# Patient Record
Sex: FEMALE | Race: WHITE | Employment: FULL TIME | URBAN - NONMETROPOLITAN AREA
[De-identification: names, ages, dates, MRNs, and addresses within clinical notes are randomized per-mention and may not be internally consistent; named-entity substitution may affect disease eponyms.]

---

## 2017-01-03 RX ORDER — VENLAFAXINE 75 MG/1
TABLET ORAL
Qty: 90 TAB | Refills: 0 | Status: SHIPPED | OUTPATIENT
Start: 2017-01-03 | End: 2017-04-18 | Stop reason: SDUPTHER

## 2017-04-19 ENCOUNTER — TELEPHONE (OUTPATIENT)
Dept: MEDICAL GROUP | Facility: PHYSICIAN GROUP | Age: 57
End: 2017-04-19

## 2017-04-19 DIAGNOSIS — E03.9 HYPOTHYROIDISM, UNSPECIFIED TYPE: ICD-10-CM

## 2017-04-19 RX ORDER — VENLAFAXINE 75 MG/1
TABLET ORAL
Qty: 90 TAB | Refills: 0 | Status: SHIPPED | OUTPATIENT
Start: 2017-04-19 | End: 2017-06-09 | Stop reason: SDUPTHER

## 2017-04-28 ENCOUNTER — OFFICE VISIT (OUTPATIENT)
Dept: MEDICAL GROUP | Facility: PHYSICIAN GROUP | Age: 57
End: 2017-04-28
Payer: COMMERCIAL

## 2017-04-28 ENCOUNTER — HOSPITAL ENCOUNTER (OUTPATIENT)
Facility: MEDICAL CENTER | Age: 57
End: 2017-04-28
Attending: INTERNAL MEDICINE
Payer: COMMERCIAL

## 2017-04-28 VITALS
OXYGEN SATURATION: 95 % | SYSTOLIC BLOOD PRESSURE: 140 MMHG | BODY MASS INDEX: 30.7 KG/M2 | RESPIRATION RATE: 16 BRPM | WEIGHT: 191 LBS | HEIGHT: 66 IN | DIASTOLIC BLOOD PRESSURE: 82 MMHG | HEART RATE: 87 BPM | TEMPERATURE: 98.1 F

## 2017-04-28 DIAGNOSIS — Z78.0 POSTMENOPAUSAL: ICD-10-CM

## 2017-04-28 DIAGNOSIS — N95.2 ATROPHIC VAGINITIS: ICD-10-CM

## 2017-04-28 DIAGNOSIS — Z12.4 CERVICAL CANCER SCREENING: ICD-10-CM

## 2017-04-28 DIAGNOSIS — F43.9 STRESS AT HOME: ICD-10-CM

## 2017-04-28 DIAGNOSIS — N39.3 STRESS INCONTINENCE OF URINE: ICD-10-CM

## 2017-04-28 PROCEDURE — 99214 OFFICE O/P EST MOD 30 MIN: CPT | Performed by: INTERNAL MEDICINE

## 2017-04-28 PROCEDURE — 88175 CYTOPATH C/V AUTO FLUID REDO: CPT

## 2017-04-28 PROCEDURE — 87624 HPV HI-RISK TYP POOLED RSLT: CPT

## 2017-04-28 RX ORDER — INSULIN LISPRO 100 [IU]/ML
30 INJECTION, SUSPENSION SUBCUTANEOUS 3 TIMES DAILY
Qty: 5 PEN | Refills: 11 | Status: SHIPPED | OUTPATIENT
Start: 2017-04-28 | End: 2018-03-01 | Stop reason: SDUPTHER

## 2017-04-28 ASSESSMENT — PATIENT HEALTH QUESTIONNAIRE - PHQ9: CLINICAL INTERPRETATION OF PHQ2 SCORE: 2

## 2017-04-28 ASSESSMENT — PAIN SCALES - GENERAL: PAINLEVEL: NO PAIN

## 2017-04-28 NOTE — PROGRESS NOTES
Chief Complaint   Patient presents with   • Gynecologic Exam     pap       HISTORY OF PRESENT ILLNESS: Patient is a 56 y.o. female established patient who has not been seen for over one year. Presents today to discuss the medical issues below.    Type II diabetes mellitus, uncontrolled  Insurance changes so not currently following with endocrine.  Patient continues with fluctuation on the FS.  Currently using the empagliflozin, changed the novolog to Humalog 75/25 at 30 tid with meals.  Denies polydipsia polyuria    Stress at home  Flooding at home.  Boyfriend moving in. Continued issues with daughter    Postmenopausal  Patient reports last vaginal bleeding 1 year ago, she did have she did have this spontaneously discontinue and therefore did not take the estrogen/progesterone at all. She currently is not taking hormone replacement. She did not keep the appointment for referral to gynecology and is here for Pap smear.    Atrophic vaginitis  Currently not using estrogen, still with vaginal dryness.      Stress incontinence of urine  Patient with stress incontinence with running, did not see gyne.        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 5 PEN 11   • venlafaxine (EFFEXOR) 75 MG Tab TAKE ONE  TABLET BY MOUTH ONCE DAILY 90 Tab 0   • levothyroxine (SYNTHROID) 112 MCG Tab TAKE ONE TABLET BY MOUTH ONCE DAILY 90 Tab 3   • metformin (GLUCOPHAGE) 1000 MG tablet TAKE ONE TABLET BY MOUTH TWICE DAILY WITH MEALS 180 Tab 3   • Blood Glucose Monitoring Suppl SUPPLIES MISC Relion Test strips. Test 4 times daily. ICD 9 250.00 Type 2 diabetes insulin requiring 400 Strip 3   • Insulin Aspart Prot & Aspart (NOVOLOG MIX 70/30 FLEXPEN) (70-30) 100 UNIT/ML SUPN Inject 30 Units as instructed 3 times a day. 90 mL 3   • hydrocodone-acetaminophen (VICODIN) 5-500 MG TABS Take 1-2 Tabs by mouth every four hours as needed.     • Insulin Pen Needle (PEN NEEDLES) 31G X 6 MM MISC 1 Each by Does not apply route 3 times a day before meals. 100 Each 11   • Multiple Vitamin (MULTIVITAMINS PO) Take  by mouth.     • celecoxib (CELEBREX) 100 MG Cap Take 1 Cap by mouth every day. (Patient not taking: Reported on 2017) 30 Cap 3   • hydrocodone-acetaminophen (NORCO) 5-325 MG Tab per tablet Take 1-2 Tabs by mouth every four hours as needed.     • Empagliflozin (JARDIANCE PO) Take  by mouth.     • TURMERIC PO Take 1 Tab by mouth every day.       No current facility-administered medications for this visit.         Past Medical History   Diagnosis Date   • Diabetes    • Hypothyroid    • LBP (low back pain)    • Rash 2015   • Avulsion of right knee 2016   • HPV in female 2016   • Atrophic vaginitis 2016   • Anxious depression 2016   • Stress incontinence of urine 2017       Social History   Substance Use Topics   • Smoking status: Never Smoker    • Smokeless tobacco: Never Used   • Alcohol Use: 0.6 oz/week     0 Standard drinks or equivalent, 1 Glasses of wine per week      Comment: glass of wine a few times a year       Family Status   Relation Status Death Age   • Mother Alive    • Brother  46     trauma   • Father Alive    • Son Alive    • Daughter Alive    • Daughter Alive      Family History   Problem  "Relation Age of Onset   • Diabetes Mother    • Diabetes Brother      type 1,    • Hypertension Brother        ROS:    Respiratory: Negative for cough, sputum production, shortness of breath or wheezing.    Cardiovascular: Negative for chest pain, palpitations, orthopnea, dyspnea with exertion or edema.   Gastrointestinal: Negative for GI upset, nausea, vomiting, abdominal pain, constipation or diarrhea.   Genitourinary: Negative for dysuria, urgency, hesitancy or frequency.       Exam:    Blood pressure 140/82, pulse 87, temperature 36.7 °C (98.1 °F), resp. rate 16, height 1.689 m (5' 6.5\"), weight 86.637 kg (191 lb), last menstrual period 2011, SpO2 95 %.  General:  Well nourished, well developed female in NAD.  HENT: Normocephalic, bilateral TMs are intact, nasal and oral mucosa with no lesions,   Neck: Supple without bruit. Thyroid is not enlarged.  Pulmonary: Clear to ausculation and percussion.  Normal effort. No rales, rhonchi, or wheezing.  Cardiovascular: Regular rate and rhythm without murmur.   Abdomen: Normal bowel sounds soft and nontender no palpable liver spleen bladder mass.  Breast: Bilaterally symmetrical, no nipple discharge, no skin changes or dimpling are  noted.  Both breasts are free of palpable pathology and the axillas are free of lymphadenopathy.  : normal external female genitalia, entroitus sans lesions, cervix visualized with no abnormal findings, pap smear is taken, on bimanual exam utereus of normal size, no masses or tenderness.    Extremities: No LE edema noted.  Neuro: Grossly nonfocal.  Psych: Alert and oriented to person, place, and time. Appropriate mood and conversation.    No recent labs available to us, endocrinology following at private office    This dictation was created using voice recognition software. I have made reasonable attempts to correct errors, however, errors of grammar and content may exist.          Assessment/Plan:    1. Uncontrolled type 2 " diabetes mellitus without complication, with long-term current use of insulin (CMS-[facility])  Patient is following home readings indicates numbers are bouncing all over the place she would like to consider some various medication changes will schedule for diabetic nurse follow, refill on her insulin pen. Obtain labs having been done by endocrinology recently.  - Insulin Lispro Prot & Lispro (75-25) 100 UNIT/ML Suspension Pen-injector; Inject 30 Units as instructed 3 times a day.  Dispense: 5 PEN; Refill: 11    2. Stress at home  Patient continues to identify stress at work place home etc. minimal coping skills, not sure she wants to do some counseling discussed at length referral made for her option to be considered  - REFERRAL TO BEHAVIORAL HEALTH    3. Postmenopausal  Continuing with difficulty with atrophic vaginitis did not keep appointment with gynecology. Trial at estrogen progestins last year resulted in bleeding. Will refer to gynecology  - REFERRAL TO GYNECOLOGY    4. Atrophic vaginitis  Vaginal bleeding with estrogen progesterone systemic. Referral to gynecology for evaluation for options of topical therapy  - REFERRAL TO GYNECOLOGY    5. Stress incontinence of urine  Patient would benefit from estrogen trial deferring to gynecology as well as consideration for intervention  - REFERRAL TO GYNECOLOGY    6. Cervical cancer screening  Clinically stable Pap smear done  - THINPREP PAP WITH HPV; Future    7. Hypothyroid  Patient continuing on thyroid replacement will obtain labs from endocrine assume ongoing monitoring and management.     Patient was seen for  25 minutes face to face of which more than 50% of the time was spent in counseling and coordination of care regarding the above problems.

## 2017-04-28 NOTE — ASSESSMENT & PLAN NOTE
Insurance changes so not following with endocrine.  Patient continues with fluctuation on the FS.  Currently using the empagliflozin, changed the novolog to Humalog 75/25 at 30 tid with meals.

## 2017-04-28 NOTE — MR AVS SNAPSHOT
"        Gem Anibal   2017 9:00 AM   Office Visit   MRN: [id number]    Department:  [facility]   Dept Phone:  [phone]    Description:  Female : 1960   Provider:  Marleny VARGAS M.D.           Reason for Visit     Gynecologic Exam pap      Allergies as of 2017     Allergen Noted Reactions    Farxiga [Dapagliflozin] 10/05/2015   Itching    Complains of hives and itching    Invokana [Canagliflozin] 10/05/2015   Itching    Complains of hives and itching    Jardiance [Empagliflozin] 2017   Itching      You were diagnosed with     Uncontrolled type 2 diabetes mellitus without complication, with long-term current use of insulin (CMS-[facility])   [6752377]       Stress at home   [438470]       Postmenopausal   [215004]       Atrophic vaginitis   [426943]       Stress incontinence of urine   [3117309]       Cervical cancer screening   [350870]         Vital Signs     Blood Pressure Pulse Temperature Respirations Height Weight    140/82 mmHg 87 36.7 °C (98.1 °F) 16 1.689 m (5' 6.5\") 86.637 kg (191 lb)    Body Mass Index Oxygen Saturation Last Menstrual Period Smoking Status          30.37 kg/m2 95% 2011 Never Smoker         Basic Information     Date Of Birth Sex Race Ethnicity Preferred Language    1960 Female White Unknown English      Your appointments     2017  2:00 PM   Urgent/Same Day with Marleny VARGAS M.D.   [facility] (--)    [address] [address] [phone]           You will be receiving a confirmation call a few days before your appointment from our automated call confirmation system.              Problem List              ICD-10-CM Priority Class Noted - Resolved    Hypothyroid E03.9   Unknown - Present    Low back pain M54.5   Unknown - Present    Stress at home F43.9   3/20/2014 - Present    Postmenopausal Z78.0   3/20/2014 - Present    Type II diabetes mellitus, uncontrolled (CMS-[facility]) E11.65   2014 - " Present    Hand pain, left M79.642   4/10/2014 - Present    Encounter for long-term (current) use of insulin (CMS-HCC) Z79.4   4/8/2015 - Present    Rash R21   9/22/2015 - Present    Acute meniscal tear of right knee S83.206A   2/26/2016 - Present    HPV in female A63.0   2/26/2016 - Present    Atrophic vaginitis N95.2   2/26/2016 - Present    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, F32.9   2/26/2016 - Present    Stress incontinence of urine N39.3   4/28/2017 - Present      Health Maintenance        Date Due Completion Dates    RETINAL SCREENING 7/29/1979 ---    IMM DTaP/Tdap/Td Vaccine (1 - Tdap) 7/29/1980 ---    IMM PNEUMOCOCCAL 19-64 (ADULT) MEDIUM RISK SERIES (1 of 1 - PPSV23) 7/29/1980 ---    MAMMOGRAM 7/29/2001 ---    COLONOSCOPY 7/29/2011 ---    SERUM CREATININE 3/26/2015 3/26/2014    FASTING LIPID PROFILE 2/14/2016 2/14/2015, 2/14/2015, 3/26/2014    URINE ACR / MICROALBUMIN 2/14/2016 2/14/2015, 3/26/2014    A1C SCREENING 3/22/2016 9/22/2015, 4/8/2015, 10/8/2014, 4/2/2014    DIABETES MONOFILAMENT / LE EXAM 4/8/2016 4/8/2015 (N/S)    Override on 4/8/2015: (N/S)    IMM HEP B VACCINE (3 of 3 - Primary Series) 4/26/2016 2/23/2016, 1/5/2016    PAP SMEAR 12/22/2018 12/22/2015, 8/1/2011, 2/11/2010            Current Immunizations     Hepatitis B Vaccine Recombivax (Adol/Adult) 2/23/2016  5:45 PM, 1/5/2016  1:54 PM    Influenza Vaccine Quad Inj (Pf) 10/8/2014      Below and/or attached are the medications your provider expects you to take. Review all of your home medications and newly ordered medications with your provider and/or pharmacist. Follow medication instructions as directed by your provider and/or pharmacist. Please keep your medication list with you and share with your provider. Update the information when medications are discontinued, doses are changed, or new medications (including over-the-counter products) are added; and carry medication information at all times in the event of emergency situations     Allergies:   FARXIGA - Itching     INVOKANA - Itching     JARDIANCE - Itching               Medications  Valid as of: April 28, 2017 -  9:46 AM    Generic Name Brand Name Tablet Size Instructions for use    Blood Glucose Monitoring Suppl (Misc) Blood Glucose Monitoring Suppl SUPPLIES Relion Test strips. Test 4 times daily. ICD 9 250.00 Type 2 diabetes insulin requiring        Celecoxib (Cap) CELEBREX 100 MG Take 1 Cap by mouth every day.        Empagliflozin   Take  by mouth.        Hydrocodone-Acetaminophen (Tab) VICODIN 5-500 MG Take 1-2 Tabs by mouth every four hours as needed.        Hydrocodone-Acetaminophen (Tab) NORCO 5-325 MG Take 1-2 Tabs by mouth every four hours as needed.        Insulin Aspart Prot & Aspart (Suspension Pen-injector) Insulin Aspart Prot & Aspart (70-30) 100 UNIT/ML Inject 30 Units as instructed 3 times a day.        Insulin Lispro Prot & Lispro (Suspension Pen-injector) Insulin Lispro Prot & Lispro (75-25) 100 UNIT/ML Inject 30 Units as instructed 3 times a day.        Insulin Pen Needle (Misc) Pen Needles 31G X 6 MM 1 Each by Does not apply route 3 times a day before meals.        Levothyroxine Sodium (Tab) SYNTHROID 112 MCG TAKE ONE TABLET BY MOUTH ONCE DAILY        MetFORMIN HCl (Tab) GLUCOPHAGE 1000 MG TAKE ONE TABLET BY MOUTH TWICE DAILY WITH MEALS        Multiple Vitamin   Take  by mouth.        Turmeric   Take 1 Tab by mouth every day.        Venlafaxine HCl (Tab) EFFEXOR 75 MG TAKE ONE TABLET BY MOUTH ONCE DAILY        .                 Medicines prescribed today were sent to:     [address] [address]    Phone: [phone] Fax: [phone]    Open 24 Hours?: No      Medication refill instructions:       If your prescription bottle indicates you have medication refills left, it is not necessary to call your provider’s office. Please contact your pharmacy and they will refill your medication.    If your prescription bottle  indicates you do not have any refills left, you may request refills at any time through one of the following ways: The online [email or web address] system (except Urgent Care), by calling your provider’s office, or by asking your pharmacy to contact your provider’s office with a refill request. Medication refills are processed only during regular business hours and may not be available until the next business day. Your provider may request additional information or to have a follow-up visit with you prior to refilling your medication.   *Please Note: Medication refills are assigned a new Rx number when refilled electronically. Your pharmacy may indicate that no refills were authorized even though a new prescription for the same medication is available at the pharmacy. Please request the medicine by name with the pharmacy before contacting your provider for a refill.        Your To Do List     Future Labs/Procedures Complete By Expires    THINPREP PAP WITH HPV  As directed 4/28/2018      Referral     A referral request has been sent to our patient care coordination department. Please allow 3-5 business days for us to process this request and contact you either by phone or mail. If you do not hear from us by the 5th business day, please call us at [phone].           [email or web address] Access Code: Activation code not generated  Current [email or web address] Status: Active

## 2017-04-28 NOTE — ASSESSMENT & PLAN NOTE
Patient reports last vaginal bleeding 1 year ago, she did have she did have this spontaneously discontinue and therefore did not take the estrogen/progesterone at all. She currently is not taking hormone replacement. She did not have referral to gynecology and is here for Pap smear.

## 2017-04-30 LAB
CYTOLOGY REG CYTOL: NORMAL
HPV HR 12 DNA CVX QL NAA+PROBE: NEGATIVE
HPV16 DNA SPEC QL NAA+PROBE: NEGATIVE
HPV18 DNA SPEC QL NAA+PROBE: NEGATIVE
SPECIMEN SOURCE: NORMAL

## 2017-05-26 ENCOUNTER — TELEPHONE (OUTPATIENT)
Dept: MEDICAL GROUP | Facility: PHYSICIAN GROUP | Age: 57
End: 2017-05-26

## 2017-05-26 NOTE — TELEPHONE ENCOUNTER
1. Caller Name: Pt                      Call Back Number: [phone] (home)       2. Message: Pt is making appt with Diabetes RN and Dr. Zurita, she wants to know if she can have the drug trulicity or something like it, injected once a week. She would like it prescribed so she can discuss results at Diabetes appt.     3. Patient approves office to leave a detailed voicemail/MyChart message: no

## 2017-05-31 ENCOUNTER — PATIENT MESSAGE (OUTPATIENT)
Dept: MEDICAL GROUP | Facility: PHYSICIAN GROUP | Age: 57
End: 2017-05-31

## 2017-06-02 NOTE — TELEPHONE ENCOUNTER
Gem would still like to try trulicity or similar once a week shot as you had discussed in Office visit.  Please advise

## 2017-06-05 ENCOUNTER — PATIENT MESSAGE (OUTPATIENT)
Dept: MEDICAL GROUP | Facility: PHYSICIAN GROUP | Age: 57
End: 2017-06-05

## 2017-06-05 DIAGNOSIS — Z20.1 EXPOSURE TO TB: ICD-10-CM

## 2017-06-08 ENCOUNTER — NON-PROVIDER VISIT (OUTPATIENT)
Dept: URGENT CARE | Facility: PHYSICIAN GROUP | Age: 57
End: 2017-06-08

## 2017-06-08 DIAGNOSIS — Z02.89 ENCOUNTER FOR OCCUPATIONAL HEALTH ASSESSMENT: ICD-10-CM

## 2017-06-08 PROCEDURE — 86580 TB INTRADERMAL TEST: CPT | Performed by: PHYSICIAN ASSISTANT

## 2017-06-08 NOTE — PROGRESS NOTES
Patient states [facility] sent her.  She paid out of pockets.  They are not sure as of now if she has been exposed.  She stated that if the test is positive then it will be a workers comp.

## 2017-06-08 NOTE — MR AVS SNAPSHOT
Gem Barnett   2017 10:30 AM   Non-Provider Visit   MRN: [id number]    Department:  [facility]   Dept Phone:  [phone]    Description:  Female : 1960   Provider:  [facility]           Reason for Visit     PPD Placement           Allergies as of 2017     Allergen Noted Reactions    Farxiga [Dapagliflozin] 10/05/2015   Itching    Complains of hives and itching    Invokana [Canagliflozin] 10/05/2015   Itching    Complains of hives and itching    Jardiance [Empagliflozin] 2017   Itching      You were diagnosed with     Encounter for occupational health assessment   [[id number]]         Vital Signs     Last Menstrual Period Smoking Status                2011 Never Smoker           Basic Information     Date Of Birth Sex Race Ethnicity Preferred Language    1960 Female White Unknown English      Your appointments     2017  2:00 PM   Urgent/Same Day with Marleny VARGAS M.D.   [facility] (--)    [address] [address] [phone]           You will be receiving a confirmation call a few days before your appointment from our automated call confirmation system.              Problem List              ICD-10-CM Priority Class Noted - Resolved    Hypothyroid E03.9   Unknown - Present    Low back pain M54.5   Unknown - Present    Stress at home F43.9   3/20/2014 - Present    Postmenopausal Z78.0   3/20/2014 - Present    Type II diabetes mellitus, uncontrolled (CMS-HCC) E11.65   2014 - Present    Hand pain, left M79.642   4/10/2014 - Present    Encounter for long-term (current) use of insulin (CMS-HCC) Z79.4   2015 - Present    Rash R21   2015 - Present    Acute meniscal tear of right knee S83.206A   2016 - Present    HPV in female A63.0   2016 - Present    Atrophic vaginitis N95.2   2016 - Present    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, F32.9   2016 - Present    Stress incontinence of urine N39.3    4/28/2017 - Present      Health Maintenance        Date Due Completion Dates    RETINAL SCREENING 7/29/1979 ---    IMM DTaP/Tdap/Td Vaccine (1 - Tdap) 7/29/1980 ---    IMM PNEUMOCOCCAL 19-64 (ADULT) MEDIUM RISK SERIES (1 of 1 - PPSV23) 7/29/1980 ---    MAMMOGRAM 7/29/2001 ---    COLONOSCOPY 7/29/2011 ---    SERUM CREATININE 3/26/2015 3/26/2014    FASTING LIPID PROFILE 2/14/2016 2/14/2015, 2/14/2015, 3/26/2014    URINE ACR / MICROALBUMIN 2/14/2016 2/14/2015, 3/26/2014    A1C SCREENING 3/22/2016 9/22/2015, 4/8/2015, 10/8/2014, 4/2/2014    DIABETES MONOFILAMENT / LE EXAM 4/8/2016 4/8/2015 (N/S)    Override on 4/8/2015: (N/S)    IMM HEP B VACCINE (3 of 3 - Primary Series) 4/26/2016 2/23/2016, 1/5/2016    PAP SMEAR 4/28/2020 4/28/2017, 12/22/2015, 8/1/2011, 2/11/2010            Current Immunizations     Hepatitis B Vaccine Recombivax (Adol/Adult) 2/23/2016  5:45 PM, 1/5/2016  1:54 PM    Influenza Vaccine Quad Inj (Pf) 10/8/2014    Tuberculin Skin Test 6/8/2017,  Incomplete      Below and/or attached are the medications your provider expects you to take. Review all of your home medications and newly ordered medications with your provider and/or pharmacist. Follow medication instructions as directed by your provider and/or pharmacist. Please keep your medication list with you and share with your provider. Update the information when medications are discontinued, doses are changed, or new medications (including over-the-counter products) are added; and carry medication information at all times in the event of emergency situations     Allergies:  FARXIGA - Itching     INVOKANA - Itching     JARDIANCE - Itching               Medications  Valid as of: June 08, 2017 - 11:53 AM    Generic Name Brand Name Tablet Size Instructions for use    Blood Glucose Monitoring Suppl (Misc) Blood Glucose Monitoring Suppl SUPPLIES Relion Test strips. Test 4 times daily. ICD 9 250.00 Type 2 diabetes insulin requiring        Celecoxib (Cap)  CELEBREX 100 MG Take 1 Cap by mouth every day.        Empagliflozin   Take  by mouth.        Hydrocodone-Acetaminophen (Tab) VICODIN 5-500 MG Take 1-2 Tabs by mouth every four hours as needed.        Hydrocodone-Acetaminophen (Tab) NORCO 5-325 MG Take 1-2 Tabs by mouth every four hours as needed.        Insulin Aspart Prot & Aspart (Suspension Pen-injector) Insulin Aspart Prot & Aspart (70-30) 100 UNIT/ML Inject 30 Units as instructed 3 times a day.        Insulin Lispro Prot & Lispro (Suspension Pen-injector) Insulin Lispro Prot & Lispro (75-25) 100 UNIT/ML Inject 30 Units as instructed 3 times a day.        Insulin Pen Needle (Misc) Pen Needles 31G X 6 MM 1 Each by Does not apply route 3 times a day before meals.        Levothyroxine Sodium (Tab) SYNTHROID 112 MCG TAKE ONE TABLET BY MOUTH ONCE DAILY        MetFORMIN HCl (Tab) GLUCOPHAGE 1000 MG TAKE ONE TABLET BY MOUTH TWICE DAILY WITH MEALS        Multiple Vitamin   Take  by mouth.        Turmeric   Take 1 Tab by mouth every day.        Venlafaxine HCl (Tab) EFFEXOR 75 MG TAKE ONE TABLET BY MOUTH ONCE DAILY        .                 Medicines prescribed today were sent to:     [facility] [address] [place] [id number]    Phone: [phone] Fax: [phone]    Open 24 Hours?: No      Medication refill instructions:       If your prescription bottle indicates you have medication refills left, it is not necessary to call your provider’s office. Please contact your pharmacy and they will refill your medication.    If your prescription bottle indicates you do not have any refills left, you may request refills at any time through one of the following ways: The online [employer] system (except Urgent Care), by calling your provider’s office, or by asking your pharmacy to contact your provider’s office with a refill request. Medication refills are processed only during regular business hours and may not be available until  the next business day. Your provider may request additional information or to have a follow-up visit with you prior to refilling your medication.   *Please Note: Medication refills are assigned a new Rx number when refilled electronically. Your pharmacy may indicate that no refills were authorized even though a new prescription for the same medication is available at the pharmacy. Please request the medicine by name with the pharmacy before contacting your provider for a refill.           [employer] Access Code: Activation code not generated  Current [employer] Status: Active

## 2017-06-09 NOTE — TELEPHONE ENCOUNTER
From: Gem Barnett  To: Marleny VARGAS M.D.  Sent: 6/9/2017 11:37 AM PDT  Subject: Medication Renewal Request    Original authorizing provider: NIYAH Hinton would like a refill of the following medications:  venlafaxine (EFFEXOR) 75 MG Tab [Marleny VARGAS M.D.]    Preferred pharmacy: [address] [place] [address]    Comment:

## 2017-06-10 ENCOUNTER — NON-PROVIDER VISIT (OUTPATIENT)
Dept: URGENT CARE | Facility: PHYSICIAN GROUP | Age: 57
End: 2017-06-10

## 2017-06-10 LAB — TB WHEAL 3D P 5 TU DIAM: 0 MM

## 2017-06-12 RX ORDER — VENLAFAXINE 75 MG/1
75 TABLET ORAL DAILY
Qty: 90 TAB | Refills: 1 | Status: SHIPPED | OUTPATIENT
Start: 2017-06-12 | End: 2017-09-05 | Stop reason: SINTOL

## 2017-06-13 NOTE — TELEPHONE ENCOUNTER
Samina has an appointment with you 7/27/17.  i let her know that you wanted labs to be done before her apt and she said she will call and schedule

## 2017-07-26 ENCOUNTER — TELEPHONE (OUTPATIENT)
Dept: MEDICAL GROUP | Facility: PHYSICIAN GROUP | Age: 57
End: 2017-07-26

## 2017-07-27 ENCOUNTER — TELEPHONE (OUTPATIENT)
Dept: MEDICAL GROUP | Facility: PHYSICIAN GROUP | Age: 57
End: 2017-07-27

## 2017-07-27 ENCOUNTER — OFFICE VISIT (OUTPATIENT)
Dept: MEDICAL GROUP | Facility: PHYSICIAN GROUP | Age: 57
End: 2017-07-27
Payer: COMMERCIAL

## 2017-07-27 VITALS
HEIGHT: 66 IN | BODY MASS INDEX: 30.37 KG/M2 | OXYGEN SATURATION: 98 % | SYSTOLIC BLOOD PRESSURE: 114 MMHG | TEMPERATURE: 98.1 F | WEIGHT: 189 LBS | DIASTOLIC BLOOD PRESSURE: 70 MMHG | RESPIRATION RATE: 16 BRPM | HEART RATE: 86 BPM

## 2017-07-27 ASSESSMENT — PAIN SCALES - GENERAL: PAINLEVEL: NO PAIN

## 2017-07-27 NOTE — ASSESSMENT & PLAN NOTE
Patient wants to try trulicity, the Jardiance and Forxigia caused itching.  She is using the Lispro 75/25 at 30 u prior to meals, varible eating, shoots for tid.  Not using novolog due to the insurance coverage, she is using the metformin.  Patient has had the victoza trial in the past, discontinued for unclear reasons.  FS are all over the place.  She has not been able to go back to Dr Anguiano due to insurance changes, she has not been able to get to the DM RN either.

## 2017-07-27 NOTE — MR AVS SNAPSHOT
"        Gem Barnett   2017 2:00 PM   Office Visit   MRN: [id number]    Department:  [facility]   Dept Phone:  [phone]    Description:  Female : 1960   Provider:  Marleny VARGAS M.D.           Reason for Visit     Results labs     Other trulicity       Allergies as of 2017     Allergen Noted Reactions    Farxiga [Dapagliflozin] 10/05/2015   Itching    Complains of hives and itching    Invokana [Canagliflozin] 10/05/2015   Itching    Complains of hives and itching    Jardiance [Empagliflozin] 2017   Itching      You were diagnosed with     Uncontrolled type 2 diabetes mellitus without complication, with long-term current use of insulin (CMS-HCC)   [[id number]]         Vital Signs     Blood Pressure Pulse Temperature Respirations Height Weight    114/70 mmHg 86 36.7 °C (98.1 °F) 16 1.689 m (5' 6.5\") 85.73 kg (189 lb)    Body Mass Index Oxygen Saturation Last Menstrual Period Smoking Status          30.05 kg/m2 98% 2011 Never Smoker         Basic Information     Date Of Birth Sex Race Ethnicity Preferred Language    1960 Female White Unknown English      Your appointments     Aug 01, 2017 11:20 AM   Diabetes Care Visit with Marleny VARGAS M.D., [facility] DIABETES RN   [facility] (--)    [address] [id number] [phone]           You will be receiving a confirmation call a few days before your appointment from our automated call confirmation system.              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   2/26/2016 - Present    Atrophic vaginitis N95.2   2/26/2016 - Present    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, 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   2/26/2016 - Present    Stress incontinence of urine N39.3   4/28/2017 - Present      Health Maintenance        Date Due Completion Dates    IMM DTaP/Tdap/Td Vaccine (1 - Tdap) 7/29/1980 ---    IMM PNEUMOCOCCAL 19-64 (ADULT) MEDIUM RISK SERIES (1 of 1 - PPSV23) 7/29/1980 ---    MAMMOGRAM 7/29/2001 ---    COLONOSCOPY 7/29/2011 ---    SERUM CREATININE 3/26/2015 3/26/2014    FASTING LIPID PROFILE 2/14/2016 2/14/2015, 2/14/2015, 3/26/2014    URINE ACR / MICROALBUMIN 2/14/2016 2/14/2015, 3/26/2014    A1C SCREENING 3/22/2016 9/22/2015, 4/8/2015, 10/8/2014, 4/2/2014    DIABETES MONOFILAMENT / LE EXAM 4/8/2016 4/8/2015 (N/S)    Override on 4/8/2015: (N/S)    IMM HEP B VACCINE (3 of 3 - Primary Series) 4/26/2016 2/23/2016, 1/5/2016    IMM INFLUENZA (1) 9/1/2017 10/8/2014    RETINAL SCREENING 6/13/2018 6/13/2017    PAP SMEAR 4/28/2020 4/28/2017, 12/22/2015, 8/1/2011, 2/11/2010            Current Immunizations     Hepatitis B Vaccine Recombivax (Adol/Adult) 2/23/2016  5:45 PM, 1/5/2016  1:54 PM    Influenza Vaccine Quad Inj (Pf) 10/8/2014    Tuberculin Skin Test 6/8/2017,  Incomplete      Below and/or attached are the medications your provider expects you to take. Review all of your home medications and newly ordered medications with your provider and/or pharmacist. Follow medication instructions as directed by your provider and/or pharmacist. Please keep your medication list with you and share with your provider. Update the information when medications are discontinued, doses are changed, or new medications (including over-the-counter products) are added; and carry medication information at all times in the event of emergency situations     Allergies:  FARXIGA - Itching     INVOKANA - Itching      JARDIANCE - Itching               Medications  Valid as of: July 27, 2017 -  2:48 PM    Generic Name Brand Name Tablet Size Instructions for use    Blood Glucose Monitoring Suppl (Misc) Blood Glucose Monitoring Suppl SUPPLIES Relion Test strips. Test 4 times daily. ICD 9 250.00 Type 2 diabetes insulin requiring        Celecoxib (Cap) CELEBREX 100 MG Take 1 Cap by mouth every day.        Hydrocodone-Acetaminophen (Tab) VICODIN 5-500 MG Take 1-2 Tabs by mouth every four hours as needed.        Hydrocodone-Acetaminophen (Tab) NORCO 5-325 MG Take 1-2 Tabs by mouth every four hours as needed.        Insulin Lispro Prot & Lispro (Suspension Pen-injector) Insulin Lispro Prot & Lispro (75-25) 100 UNIT/ML Inject 30 Units as instructed 3 times a day.        Insulin Pen Needle (Misc) Pen Needles 31G X 6 MM 1 Each by Does not apply route 3 times a day before meals.        Levothyroxine Sodium (Tab) SYNTHROID 112 MCG TAKE ONE TABLET BY MOUTH ONCE DAILY        MetFORMIN HCl (Tab) GLUCOPHAGE 1000 MG TAKE ONE TABLET BY MOUTH TWICE DAILY WITH MEALS        Multiple Vitamin   Take  by mouth.        Turmeric   Take 1 Tab by mouth every day.        Venlafaxine HCl (Tab) EFFEXOR 75 MG Take 1 Tab by mouth every day.        .                 Medicines prescribed today were sent to:     [address] [address]    Phone: [phone] Fax: [phone]    Open 24 Hours?: No      Medication refill instructions:       If your prescription bottle indicates you have medication refills left, it is not necessary to call your provider’s office. Please contact your pharmacy and they will refill your medication.    If your prescription bottle indicates you do not have any refills left, you may request refills at any time through one of the following ways: The online [employer] system (except Urgent Care), by calling your provider’s office, or by asking your pharmacy to contact your  provider’s office with a refill request. Medication refills are processed only during regular business hours and may not be available until the next business day. Your provider may request additional information or to have a follow-up visit with you prior to refilling your medication.   *Please Note: Medication refills are assigned a new Rx number when refilled electronically. Your pharmacy may indicate that no refills were authorized even though a new prescription for the same medication is available at the pharmacy. Please request the medicine by name with the pharmacy before contacting your provider for a refill.           MDCapsulet Access Code: Activation code not generated  Current [employer] Software Status: Active

## 2017-07-28 NOTE — TELEPHONE ENCOUNTER
1. Caller Name: Gem                      Call Back Number: [phone]    2. Message: patient stated today that she had an annual wellness visit in March with you.  Her insurance is trying to get her to pay a Co-Pay for the visit because they say it was not an annual visit. I am not sure how to pursue this.  Please advise     3. Patient approves office to leave a detailed voicemail/MyChart message: N\A

## 2017-07-28 NOTE — TELEPHONE ENCOUNTER
It is my impression that if we do any medical intervention then the visit does not qualify as annual wellness.  The notes clearly document the refill on the insulin and the referral to [facility].  This is why we clarify with the patient exactly what annual wellness means.  I am happy to request the change to annual wellness, however, likely the insurance company will look at the records and let us know it does not qualify.  Generally I refer this to the billing department, they take a look at the documentation and let us know if the change to annual wellness would be accepted by the insurance co.

## 2017-07-28 NOTE — PROGRESS NOTES
Chief Complaint   Patient presents with   • Results     labs    • Other     trulicity        HISTORY OF PRESENT ILLNESS: Patient is a 56 y.o. female established patient who presents today to discuss the medical issues below.    Type II diabetes mellitus, uncontrolled (CMS-HCC)  Patient wants to try trulicity, the Jardiance and Forxigia caused itching.  She is using the Lispro 75/25 at 30 u prior to meals, varible eating, shoots for tid.  Not using novolog due to the insurance coverage, she is using the metformin.  Patient has had the victoza trial in the past, discontinued for unclear reasons.  FS are all over the place.  She has not been able to go back to Dr Anguiano due to insurance changes, she has not been able to get to the DM RN either.       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 90 Tab 1   • Insulin Lispro Prot & Lispro (75-25) 100 UNIT/ML Suspension Pen-injector Inject 30 Units as instructed 3 times a day. 5 PEN 11   • levothyroxine (SYNTHROID) 112 MCG Tab TAKE ONE TABLET BY MOUTH ONCE DAILY 90 Tab 3   • metformin (GLUCOPHAGE) 1000 MG tablet TAKE ONE TABLET BY MOUTH TWICE DAILY WITH MEALS 180 Tab 3   • Blood Glucose Monitoring Suppl SUPPLIES MISC Relion Test strips. Test 4 times daily. ICD 9 250.00 Type 2 diabetes insulin requiring 400 Strip 3    • hydrocodone-acetaminophen (VICODIN) 5-500 MG TABS Take 1-2 Tabs by mouth every four hours as needed.     • Insulin Pen Needle (PEN NEEDLES) 31G X 6 MM MISC 1 Each by Does not apply route 3 times a day before meals. 100 Each 11   • Multiple Vitamin (MULTIVITAMINS PO) Take  by mouth.     • celecoxib (CELEBREX) 100 MG Cap Take 1 Cap by mouth every day. (Patient not taking: Reported on 2017) 30 Cap 3   • hydrocodone-acetaminophen (NORCO) 5-325 MG Tab per tablet Take 1-2 Tabs by mouth every four hours as needed.     • TURMERIC PO Take 1 Tab by mouth every day.       No current facility-administered medications for this visit.         Past Medical History   Diagnosis Date   • Diabetes    • Hypothyroid    • LBP (low back pain)    • Rash 2015   • Avulsion of right knee 2016   • HPV in female 2016   • Atrophic vaginitis 2016   • Anxious depression 2016   • Stress incontinence of urine 2017       Social History   Substance Use Topics   • Smoking status: Never Smoker    • Smokeless tobacco: Never Used   • Alcohol Use: 0.6 oz/week     1 Glasses of wine, 0 Standard drinks or equivalent per week      Comment: glass of wine a few times a year       Family Status   Relation Status Death Age   • Mother Alive    • Brother  46     trauma   • Father Alive    • Son Alive    • Daughter Alive    • Daughter Alive      Family History   Problem Relation Age of Onset   • Diabetes Mother    • Diabetes Brother      type 1,    • Hypertension Brother        ROS:    Respiratory: Negative for cough, sputum production, shortness of breath or wheezing.    Cardiovascular: Negative for chest pain, palpitations, orthopnea, dyspnea with exertion or edema.   Gastrointestinal: Negative for GI upset, nausea, vomiting, abdominal pain, constipation or diarrhea.   Genitourinary: Negative for dysuria, urgency, hesitancy or frequency.       Exam:    Blood pressure 114/70, pulse 86, temperature 36.7 °C (98.1  "°F), resp. rate 16, height 1.689 m (5' 6.5\"), weight 85.73 kg (189 lb), last menstrual period 06/30/2011, SpO2 98 %.  General:  Well nourished, well developed female in NAD.  Pulmonary: Clear to ausculation and percussion.  Normal effort. No rales, rhonchi, or wheezing.  Cardiovascular: Regular rate and rhythm without murmur.   Abdomen: Normal bowel sounds soft and nontender no palpable liver spleen bladder mass.    LABS: Results reviewed and discussed with the patient, questions answered.      This dictation was created using voice recognition software. I have made reasonable attempts to correct errors, however, errors of grammar and content may exist.          Assessment/Plan:    1. Uncontrolled type 2 diabetes mellitus without complication, with long-term current use of insulin (CMS-HCC)  Patient was very complicated history in terms of medication use, ability to control blood sugars. She has home readings that are very variable most likely on the basis of dietary variability. Patient would like a trial of GLP-1 as [facility] hoping that that would smooth out how things are going. She currently continues on insulin utilization. She has had problems with the SG L2 class of medicines. She did very well on Victoza in the past without utilization of insulin at that time. Patient indicates this was discontinued because of variability of blood sugars? Overall management somewhat complex she's having difficulty reestablishing with endocrinology when Dr. Anguiano's office left her insurance plan. Will schedule her for diabetic nurse next week to discuss options and establish close monitoring in that venue.  Patient very concerned about cost. She has co-pay with office visits. Will defer this visit to the diabetic nurse office visit.    "

## 2017-08-01 ENCOUNTER — OFFICE VISIT (OUTPATIENT)
Dept: MEDICAL GROUP | Facility: PHYSICIAN GROUP | Age: 57
End: 2017-08-01
Payer: COMMERCIAL

## 2017-08-01 VITALS
BODY MASS INDEX: 29.82 KG/M2 | WEIGHT: 190 LBS | HEIGHT: 67 IN | DIASTOLIC BLOOD PRESSURE: 70 MMHG | OXYGEN SATURATION: 97 % | TEMPERATURE: 97.5 F | HEART RATE: 90 BPM | SYSTOLIC BLOOD PRESSURE: 122 MMHG

## 2017-08-01 PROCEDURE — 99215 OFFICE O/P EST HI 40 MIN: CPT | Performed by: INTERNAL MEDICINE

## 2017-08-01 RX ORDER — LEVOTHYROXINE SODIUM 0.12 MG/1
125 TABLET ORAL
Qty: 90 TAB | Refills: 3 | Status: SHIPPED | OUTPATIENT
Start: 2017-08-01 | End: 2018-07-26 | Stop reason: SDUPTHER
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
## 2017-08-01 NOTE — MR AVS SNAPSHOT
"        Gem Barnett   2017 11:20 AM   Office Visit   MRN: [id number]    Department:  [facility]   Dept Phone:  [phone]    Description:  Female : 1960   Provider:  Marleny VARGAS M.D.; NIKKI DIABETES RN           Reason for Visit     Diabetes           Allergies as of 2017     Allergen Noted Reactions    Farxiga [Dapagliflozin] 10/05/2015   Itching    Complains of hives and itching    Invokana [Canagliflozin] 10/05/2015   Itching    Complains of hives and itching    Jardiance [Empagliflozin] 2017   Itching      You were diagnosed with     Uncontrolled type 2 diabetes mellitus without complication, with long-term current use of insulin (CMS-HCC)   [[id number]]         Vital Signs     Blood Pressure Pulse Temperature Height Weight Body Mass Index    122/70 mmHg 90 36.4 °C (97.5 °F) 1.689 m (5' 6.5\") 86.183 kg (190 lb) 30.21 kg/m2    Oxygen Saturation Last Menstrual Period Smoking Status             97% 2011 Never Smoker          Basic Information     Date Of Birth Sex Race Ethnicity Preferred Language    1960 Female White Unknown English      Your appointments     Sep 05, 2017  9:00 AM   Diabetes Care Visit with Marleny VARGAS M.D., NIKKI DIABETES RN   [facility] (--)    [address] [id number] [phone]           You will be receiving a confirmation call a few days before your appointment from our automated call confirmation system.              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   2/26/2016 - Present    Atrophic vaginitis N95.2   2/26/2016 - Present    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, 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   2/26/2016 - Present    Stress incontinence of urine N39.3   4/28/2017 - Present      Health Maintenance        Date Due Completion Dates    IMM DTaP/Tdap/Td Vaccine (1 - Tdap) 7/29/1980 ---    IMM PNEUMOCOCCAL 19-64 (ADULT) MEDIUM RISK SERIES (1 of 1 - PPSV23) 7/29/1980 ---    MAMMOGRAM 7/29/2001 ---    COLONOSCOPY 7/29/2011 ---    SERUM CREATININE 3/26/2015 3/26/2014    FASTING LIPID PROFILE 2/14/2016 2/14/2015, 2/14/2015, 3/26/2014    URINE ACR / MICROALBUMIN 2/14/2016 2/14/2015, 3/26/2014    A1C SCREENING 3/22/2016 9/22/2015, 4/8/2015, 10/8/2014, 4/2/2014    DIABETES MONOFILAMENT / LE EXAM 4/8/2016 4/8/2015 (N/S)    Override on 4/8/2015: (N/S)    IMM HEP B VACCINE (3 of 3 - Primary Series) 4/26/2016 2/23/2016, 1/5/2016    IMM INFLUENZA (1) 9/1/2017 10/8/2014    RETINAL SCREENING 6/13/2018 6/13/2017    PAP SMEAR 4/28/2020 4/28/2017, 12/22/2015, 8/1/2011, 2/11/2010            Current Immunizations     Hepatitis B Vaccine Recombivax (Adol/Adult) 2/23/2016  5:45 PM, 1/5/2016  1:54 PM    Influenza Vaccine Quad Inj (Pf) 10/8/2014    Tuberculin Skin Test 6/8/2017,  Incomplete      Below and/or attached are the medications your provider expects you to take. Review all of your home medications and newly ordered medications with your provider and/or pharmacist. Follow medication instructions as directed by your provider and/or pharmacist. Please keep your medication list with you and share with your provider. Update the information when medications are discontinued, doses are changed, or new medications (including over-the-counter products) are added; and carry medication information at all times in the event of emergency situations     Allergies:  FARXIGA - Itching     INVOKANA - Itching     JARDIANCE -  Itching               Medications  Valid as of: August 01, 2017 - 12:34 PM    Generic Name Brand Name Tablet Size Instructions for use    Blood Glucose Monitoring Suppl (Misc) Blood Glucose Monitoring Suppl SUPPLIES Relion Test strips. Test 4 times daily. ICD 9 250.00 Type 2 diabetes insulin requiring        Dulaglutide (Solution Pen-injector) Dulaglutide 0.75 MG/0.5ML Inject 1 Each as instructed every 7 days.        Hydrocodone-Acetaminophen (Tab) VICODIN 5-500 MG Take 1-2 Tabs by mouth every four hours as needed.        Insulin Lispro Prot & Lispro (Suspension Pen-injector) Insulin Lispro Prot & Lispro (75-25) 100 UNIT/ML Inject 30 Units as instructed 3 times a day.        Insulin Pen Needle (Misc) Pen Needles 31G X 6 MM 1 Each by Does not apply route 3 times a day before meals.        Insulin Pen Needle (Misc) Insulin Pen Needle 32G X 4 MM 1 Each by Does not apply route 2 times daily, before breakfast and dinner.        Levothyroxine Sodium (Tab) SYNTHROID 112 MCG TAKE ONE TABLET BY MOUTH ONCE DAILY        Levothyroxine Sodium (Tab) SYNTHROID 125 MCG Take 1 Tab by mouth Every morning on an empty stomach.        MetFORMIN HCl (Tab) GLUCOPHAGE 1000 MG TAKE ONE TABLET BY MOUTH TWICE DAILY WITH MEALS        Multiple Vitamin   Take  by mouth.        Venlafaxine HCl (Tab) EFFEXOR 75 MG Take 1 Tab by mouth every day.        .                 Medicines prescribed today were sent to:     [facility] [address] [place] [id number]    Phone: [phone] Fax: [phone]    Open 24 Hours?: No      Medication refill instructions:       If your prescription bottle indicates you have medication refills left, it is not necessary to call your provider’s office. Please contact your pharmacy and they will refill your medication.    If your prescription bottle indicates you do not have any refills left, you may request refills at any time through one of the following ways: The  online fluid Operations system (except Urgent Care), by calling your provider’s office, or by asking your pharmacy to contact your provider’s office with a refill request. Medication refills are processed only during regular business hours and may not be available until the next business day. Your provider may request additional information or to have a follow-up visit with you prior to refilling your medication.   *Please Note: Medication refills are assigned a new Rx number when refilled electronically. Your pharmacy may indicate that no refills were authorized even though a new prescription for the same medication is available at the pharmacy. Please request the medicine by name with the pharmacy before contacting your provider for a refill.           fluid Operations Access Code: Activation code not generated  Current fluid Operations Status: Active

## 2017-09-05 ENCOUNTER — OFFICE VISIT (OUTPATIENT)
Dept: MEDICAL GROUP | Facility: PHYSICIAN GROUP | Age: 57
End: 2017-09-05
Payer: COMMERCIAL

## 2017-09-05 VITALS
HEIGHT: 67 IN | DIASTOLIC BLOOD PRESSURE: 70 MMHG | OXYGEN SATURATION: 94 % | HEART RATE: 96 BPM | WEIGHT: 187 LBS | SYSTOLIC BLOOD PRESSURE: 110 MMHG | BODY MASS INDEX: 29.35 KG/M2 | TEMPERATURE: 96.9 F

## 2017-09-05 DIAGNOSIS — E03.9 HYPOTHYROIDISM, UNSPECIFIED TYPE: ICD-10-CM

## 2017-09-05 LAB
HBA1C MFR BLD: 9.8 % (ref ?–5.8)
INT CON NEG: NORMAL
INT CON POS: NORMAL

## 2017-09-05 PROCEDURE — 83036 HEMOGLOBIN GLYCOSYLATED A1C: CPT | Performed by: INTERNAL MEDICINE

## 2017-09-05 PROCEDURE — 99215 OFFICE O/P EST HI 40 MIN: CPT | Performed by: INTERNAL MEDICINE

## 2017-09-05 RX ORDER — METFORMIN HYDROCHLORIDE 500 MG/1
1000 TABLET, EXTENDED RELEASE ORAL 2 TIMES DAILY
Qty: 360 TAB | Refills: 3 | Status: SHIPPED | OUTPATIENT
Start: 2017-09-05 | End: 2018-11-07 | Stop reason: SDUPTHER

## 2017-09-05 NOTE — PROGRESS NOTES
RN-CDE Note  Type 2 diabetes for 6 years  Subjective:     Health changes since last visit/interval Hx: States under a lot of stress.  She stopped her depression medication because it was making her nauseated.    Medications (including changes made today)  Current Outpatient Prescriptions   Medication Sig Dispense Refill   • Cholecalciferol (VITAMIN D3) 5000 units Tab Take 10,000 Units by mouth.     • Dulaglutide (TRULICITY) 0.75 MG/0.5ML Solution Pen-injector Inject 1 Each as instructed every 7 days. 4 PEN 11   • Insulin Pen Needle 32G X 4 MM Misc 1 Each by Does not apply route 2 times daily, before breakfast and dinner. 200 Each 1   • levothyroxine (SYNTHROID) 125 MCG Tab Take 1 Tab by mouth Every morning on an empty stomach. 90 Tab 3   • Insulin Lispro Prot & Lispro (75-25) 100 UNIT/ML Suspension Pen-injector Inject 30 Units as instructed 3 times a day. 5 PEN 11   • metformin (GLUCOPHAGE) 1000 MG tablet TAKE ONE TABLET BY MOUTH TWICE DAILY WITH MEALS 180 Tab 3   • Blood Glucose Monitoring Suppl SUPPLIES MISC Relion Test strips. Test 4 times daily. ICD 9 250.00 Type 2 diabetes insulin requiring 400 Strip 3   • Multiple Vitamin (MULTIVITAMINS PO) Take  by mouth.     • hydrocodone-acetaminophen (VICODIN) 5-500 MG TABS Take 1-2 Tabs by mouth every four hours as needed.       No current facility-administered medications for this visit.          Taking daily ASA: No  Taking above medications as prescribed: Yes   Patient Denies side effects of medication.    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  Diet: Breakfast is coffee, bagel and cream cheese or yogurt and apple.  Snack is apple and nuts.  Lunch is a sandwich.  Dinner she eats out a lot.      Health Maintenance:   Health Maintenance Topics with due status: Overdue       Topic Date Due    IMM DTaP/Tdap/Td Vaccine 07/29/1980    IMM PNEUMOCOCCAL 19-64 (ADULT) MEDIUM RISK SERIES 07/29/1980    MAMMOGRAM 07/29/2001    COLONOSCOPY 07/29/2011    SERUM  CREATININE 03/26/2015    FASTING LIPID PROFILE 02/14/2016    URINE ACR / MICROALBUMIN 02/14/2016    A1C SCREENING 03/22/2016    DIABETES MONOFILAMENT / LE EXAM 04/08/2016    IMM HEP B VACCINE 04/26/2016    IMM INFLUENZA 09/01/2017         DM:   Last A1c:   Lab Results   Component Value Date/Time    HBA1C 9.8 09/05/2017 09:18 AM      A1c goal: < 7    Glucose monitoring frequency: Testing blood sugars 3-4 times daily  trend: Was running low when she first started the Trulicity.  Now running in the 120-200's after eating.  Hypoglycemic episodes: yes - she was running in the 60's after dinner.     Last Retinal Exam: 6/13/17 Provider: Chicho  Daily Foot Exam: yes  Routine Dental Exams: yes    Lab Results   Component Value Date/Time    MICROALBCALC 10 02/14/2015    MALBCRT 15 03/26/2014 07:05 AM    MICROALBUR 0.7 03/26/2014 07:05 AM        ACR Albumin/Creatinine Ratio goal <30     Diabetic complications: none    HTN:   Blood pressure goal <140/<80 .   Currently Rx ACE/ARB: Yes    Dyslipidemia:    Lab Results   Component Value Date/Time    CHOLSTRLTOT 55 02/14/2015    CHOLSTRLTOT 190 02/14/2015     02/14/2015     02/14/2015    HDL 55 02/14/2015    TRIGLYCERIDE 190 02/14/2015    TRIGLYCERIDE 132 02/14/2015       Lab Results   Component Value Date/Time    SODIUM 133 (L) 03/26/2014 07:06 AM    POTASSIUM 4.2 03/26/2014 07:06 AM    CHLORIDE 99 03/26/2014 07:06 AM    CO2 28 03/26/2014 07:06 AM    GLUCOSE 377 (H) 03/26/2014 07:06 AM    BUN 15 03/26/2014 07:06 AM    CREATININE 0.80 03/26/2014 07:06 AM     Lab Results   Component Value Date/Time    ALKPHOSPHAT 152 (H) 03/26/2014 07:06 AM    ASTSGOT 24 03/26/2014 07:06 AM    ALTSGPT 26 03/26/2014 07:06 AM    TBILIRUBIN 0.4 03/26/2014 07:06 AM        Currently Rx Statin: No      She  reports that she has never smoked. She has never used smokeless tobacco.    Objective:     Exam:  Monofilament: done  Monofilament testing with a 10 gram force: sensation intact: intact  bilaterally  Visual Inspection: Feet without maceration, ulcers, fissures.  Pedal pulses: intact bilaterally      Plan:     - Diabetic diet discussed in detail-plate method.  - Home glucose monitoring.  - She will test and log.    - She will walk for 20-30 minutes daily.  - Reviewed medications and advised to take metformin after meals to decrease   G.I.upset.   - Discussed importance of immunizations and yearly eye exams.   - Encouraged patient to attend diabetes education program.   -Educational material distributed.   - Advised daily foot exams. Educated on signs of infection.       Recommended medication changes: She is having a hard time remembering to take her insulin before meals and her second metformin.  We will increase her Trulicity to 1.5 mg weekly.  She would like to change her Metformin to Extended Release.    Patient has tried the metformin, finds it difficult to remember the afternoon metformin.  Patient forgets to take the insulin until after the meal.  She takes the insulin with but she didn't take the metformin so forgets the afternoon dose.      Exam:  Heart regular rate and rhythm  Lungs clear no rhonchi rales wheezes  Abdomen soft nontender no masses  Extremities no edema    Reviewed recommendations made by diabetic nurse. Agree with increased velocity, discussed metformin and various memory techniques for compliance with medications. Encouraged.    Reviewed recommendation for labs prior to next office visit follow-up clinically euthyroid.    Patient was seen for 40 minutes face to face of which more than 50% of the time was spent in counseling and coordination of care regarding the above problems.

## 2017-09-18 ENCOUNTER — TELEPHONE (OUTPATIENT)
Dept: HEALTH INFORMATION MANAGEMENT | Facility: MEDICAL CENTER | Age: 57
End: 2017-09-18

## 2017-09-18 NOTE — TELEPHONE ENCOUNTER
Gem states she was started on a Medrol dose pack by her dentist that is making her blood sugars high.  She will increase her 75/25 insulin to 40 units before meals and adjust as needed to keep her blood sugars down under 180.

## 2018-02-19 LAB — HBA1C MFR BLD: 10.1 % (ref ?–5.8)

## 2018-03-01 ENCOUNTER — OFFICE VISIT (OUTPATIENT)
Dept: MEDICAL GROUP | Facility: PHYSICIAN GROUP | Age: 58
End: 2018-03-01
Payer: COMMERCIAL

## 2018-03-01 VITALS
DIASTOLIC BLOOD PRESSURE: 70 MMHG | HEART RATE: 96 BPM | HEIGHT: 67 IN | TEMPERATURE: 97.1 F | BODY MASS INDEX: 27.62 KG/M2 | OXYGEN SATURATION: 95 % | WEIGHT: 176 LBS | SYSTOLIC BLOOD PRESSURE: 108 MMHG

## 2018-03-01 DIAGNOSIS — E03.9 HYPOTHYROIDISM, UNSPECIFIED TYPE: ICD-10-CM

## 2018-03-01 DIAGNOSIS — E55.9 VITAMIN D DEFICIENCY: ICD-10-CM

## 2018-03-01 DIAGNOSIS — F41.8 ANXIOUS DEPRESSION: ICD-10-CM

## 2018-03-01 PROCEDURE — 99214 OFFICE O/P EST MOD 30 MIN: CPT | Performed by: INTERNAL MEDICINE

## 2018-03-01 RX ORDER — INSULIN LISPRO 100 [IU]/ML
50 INJECTION, SUSPENSION SUBCUTANEOUS 3 TIMES DAILY
Qty: 45 ML | Refills: 11 | Status: SHIPPED | OUTPATIENT
Start: 2018-03-01 | End: 2019-04-11 | Stop reason: SDUPTHER

## 2018-03-01 NOTE — PROGRESS NOTES
RN-CDE Note  Type 2 Diabetes  Subjective:     Health changes since last visit/interval Hx: States got a cold this week.  States still stressed.    Medications (including changes made today)  Current Outpatient Prescriptions   Medication Sig Dispense Refill   • Cholecalciferol (VITAMIN D3) 5000 units Tab Take 10,000 Units by mouth.     • Dulaglutide 1.5 MG/0.5ML Solution Pen-injector Inject 1.5 mg as instructed every 7 days. 4 PEN 11   • metformin ER (GLUCOPHAGE XR) 500 MG TABLET SR 24 HR Take 2 Tabs by mouth 2 times a day. 360 Tab 3   • Insulin Pen Needle 32G X 4 MM Misc 1 Each by Does not apply route 2 times daily, before breakfast and dinner. 200 Each 1   • levothyroxine (SYNTHROID) 125 MCG Tab Take 1 Tab by mouth Every morning on an empty stomach. 90 Tab 3   • Insulin Lispro Prot & Lispro (75-25) 100 UNIT/ML Suspension Pen-injector Inject 30 Units as instructed 3 times a day. 5 PEN 11   • Blood Glucose Monitoring Suppl SUPPLIES MISC Relion Test strips. Test 4 times daily. ICD 9 250.00 Type 2 diabetes insulin requiring 400 Strip 3   • Multiple Vitamin (MULTIVITAMINS PO) Take  by mouth.     • hydrocodone-acetaminophen (VICODIN) 5-500 MG TABS Take 1-2 Tabs by mouth every four hours as needed.       No current facility-administered medications for this visit.          Taking daily ASA: No  Taking above medications as prescribed: Yes   Patient Denies side effects of medication.    Exercise: Walking at work.  Diet: Breakfast is coffee, bagel, and cream cheese.  10 am has salami and cheese.  Lunch is sandwich and diet soda.  Dinner is at 6 pm is protein, vegetable, and starch.    Health Maintenance:   Health Maintenance Topics with due status: Overdue       Topic Date Due    IMM DTaP/Tdap/Td Vaccine 07/29/1980    IMM PNEUMOCOCCAL 19-64 (ADULT) MEDIUM RISK SERIES 07/29/1980    MAMMOGRAM 07/29/2001    COLONOSCOPY 07/29/2011    SERUM CREATININE 03/26/2015    FASTING LIPID PROFILE 02/14/2016    URINE ACR / MICROALBUMIN  02/14/2016    IMM HEP B VACCINE 04/26/2016    IMM INFLUENZA 09/01/2017         DM:   Last A1c:   Lab Results   Component Value Date/Time    HBA1C 10.1 02/19/2018      A1c goal: < 7    Glucose monitoring frequency: States has been testing blood sugars at different times usually after she eats.  trend: 270's  Hypoglycemic episodes: no     Last Retinal Exam: 6/13/17 Provider: Chicho  Daily Foot Exam: yes  Routine Dental Exams: yes    Lab Results   Component Value Date/Time    MICROALBCALC 10 02/14/2015    MALBCRT 15 03/26/2014 07:05 AM    MICROALBUR 0.7 03/26/2014 07:05 AM        ACR Albumin/Creatinine Ratio goal <30     Diabetic complications: none    HTN:   Blood pressure goal <140/<80 .   Currently Rx ACE/ARB: No    Dyslipidemia:    Lab Results   Component Value Date/Time    CHOLSTRLTOT 55 02/14/2015    CHOLSTRLTOT 190 02/14/2015     02/14/2015     02/14/2015    HDL 55 02/14/2015    TRIGLYCERIDE 190 02/14/2015    TRIGLYCERIDE 132 02/14/2015       Lab Results   Component Value Date/Time    SODIUM 133 (L) 03/26/2014 07:06 AM    POTASSIUM 4.2 03/26/2014 07:06 AM    CHLORIDE 99 03/26/2014 07:06 AM    CO2 28 03/26/2014 07:06 AM    GLUCOSE 377 (H) 03/26/2014 07:06 AM    BUN 15 03/26/2014 07:06 AM    CREATININE 0.80 03/26/2014 07:06 AM     Lab Results   Component Value Date/Time    ALKPHOSPHAT 152 (H) 03/26/2014 07:06 AM    ASTSGOT 24 03/26/2014 07:06 AM    ALTSGPT 26 03/26/2014 07:06 AM    TBILIRUBIN 0.4 03/26/2014 07:06 AM        Currently Rx Statin: No      She  reports that she has never smoked. She has never used smokeless tobacco.    Objective:     Exam:  Monofilament: done  Monofilament testing with a 10 gram force: sensation intact: intact bilaterally.  States some numbness on the out side of both feet  Visual Inspection: Feet without maceration, ulcers, fissures.  Pedal pulses: intact bilaterally      Plan:     - Diabetic diet discussed in detail-plate method.  - Home glucose monitoring.  - She will  test and log.    - She will walk for 20-30 minutes daily.  - Reviewed medications and advised to take metformin after meals to decrease   G.I.upset.   - Discussed importance of immunizations and yearly eye exams.   - Encouraged patient to attend diabetes education program.   -Educational material distributed.   - Advised daily foot exams. Educated on signs of infection.       Recommended medication changes: She does not want to move off the mixed insulin.  She will increase her Humalog 75/25 to 40 units before meals and increase up to 50 units before meals if blood sugars staying up over 150.    Problem List Items Addressed This Visit        Unprioritized    Type II diabetes mellitus, uncontrolled (CMS-[facility])     difficuolty with diet and stress.    Reviewed RN recommendations with RN and patient. Agree with intervention. Indications of A1c of 10.1 discussed with the patient, labs regarding lipid, renal, lft ordered.           Anxious depression     Patient continues to cite stress as barrier to following diet and FS.  Multiple family issues.    Long discussion held regarding stress management. Offered clinical counseling referral to behavior health. She is reticent to proceed on the. Declines medication intervention. Support monitor            Patient was seen for 45 minutes face to face of which more than 50% of the time was spent in counseling and coordination of care regarding the above problems.

## 2018-03-30 ENCOUNTER — PATIENT MESSAGE (OUTPATIENT)
Dept: MEDICAL GROUP | Facility: PHYSICIAN GROUP | Age: 58
End: 2018-03-30

## 2018-04-04 RX ORDER — VENLAFAXINE 75 MG/1
75 TABLET ORAL DAILY
Qty: 90 TAB | Refills: 1 | Status: SHIPPED | OUTPATIENT
Start: 2018-04-04 | End: 2018-09-08 | Stop reason: SDUPTHER

## 2018-06-07 ENCOUNTER — OFFICE VISIT (OUTPATIENT)
Dept: MEDICAL GROUP | Facility: PHYSICIAN GROUP | Age: 58
End: 2018-06-07
Payer: COMMERCIAL

## 2018-06-07 VITALS
TEMPERATURE: 97.9 F | SYSTOLIC BLOOD PRESSURE: 110 MMHG | WEIGHT: 181 LBS | BODY MASS INDEX: 28.41 KG/M2 | HEIGHT: 67 IN | DIASTOLIC BLOOD PRESSURE: 60 MMHG | OXYGEN SATURATION: 99 % | HEART RATE: 94 BPM

## 2018-06-07 DIAGNOSIS — E78.5 HYPERLIPIDEMIA, UNSPECIFIED HYPERLIPIDEMIA TYPE: ICD-10-CM

## 2018-06-07 DIAGNOSIS — Z23 NEED FOR DIPHTHERIA-TETANUS-PERTUSSIS (TDAP) VACCINE: ICD-10-CM

## 2018-06-07 PROCEDURE — 99215 OFFICE O/P EST HI 40 MIN: CPT | Mod: 25 | Performed by: INTERNAL MEDICINE

## 2018-06-07 PROCEDURE — 90715 TDAP VACCINE 7 YRS/> IM: CPT | Performed by: INTERNAL MEDICINE

## 2018-06-07 PROCEDURE — 90471 IMMUNIZATION ADMIN: CPT | Performed by: INTERNAL MEDICINE

## 2018-06-07 NOTE — PROGRESS NOTES
RN-CDE Note    Subjective:     Health changes since last visit/interval Hx: States feeling better.    Medications (including changes made today)  Current Outpatient Prescriptions   Medication Sig Dispense Refill   • venlafaxine (EFFEXOR) 75 MG Tab Take 1 Tab by mouth every day. 90 Tab 1   • Insulin Lispro Prot & Lispro (75-25) 100 UNIT/ML Suspension Pen-injector Inject 50 Units as instructed 3 times a day. 45 mL 11   • Cholecalciferol (VITAMIN D3) 5000 units Tab Take 10,000 Units by mouth.     • Dulaglutide 1.5 MG/0.5ML Solution Pen-injector Inject 1.5 mg as instructed every 7 days. 4 PEN 11   • metformin ER (GLUCOPHAGE XR) 500 MG TABLET SR 24 HR Take 2 Tabs by mouth 2 times a day. 360 Tab 3   • Insulin Pen Needle 32G X 4 MM Misc 1 Each by Does not apply route 2 times daily, before breakfast and dinner. 200 Each 1   • levothyroxine (SYNTHROID) 125 MCG Tab Take 1 Tab by mouth Every morning on an empty stomach. 90 Tab 3   • Blood Glucose Monitoring Suppl SUPPLIES MISC Relion Test strips. Test 4 times daily. ICD 9 250.00 Type 2 diabetes insulin requiring 400 Strip 3   • Multiple Vitamin (MULTIVITAMINS PO) Take  by mouth.     • hydrocodone-acetaminophen (VICODIN) 5-500 MG TABS Take 1-2 Tabs by mouth every four hours as needed.       No current facility-administered medications for this visit.          Taking daily ASA: No  Taking above medications as prescribed: Yes   Patient Denies side effects of medication.    Exercise: Walking a lot and will start biking  Diet: Breakfast is oatmeal and egg.Lunch is chicken sandwich.  Dinner she eats out a lot and eats a lot of protein, vegetable, and starch.    Health Maintenance:   Health Maintenance Topics with due status: Overdue       Topic Date Due    IMM DTaP/Tdap/Td Vaccine 07/29/1980    IMM PNEUMOCOCCAL 19-64 (ADULT) MEDIUM RISK SERIES 07/29/1980    MAMMOGRAM 07/29/2001    COLONOSCOPY 07/29/2011    SERUM CREATININE 03/26/2015    FASTING LIPID PROFILE 02/14/2016    URINE ACR  / MICROALBUMIN 02/14/2016    IMM HEP B VACCINE 04/26/2016         DM:   Last A1c:   Lab Results   Component Value Date/Time    HBA1C 10.1 02/19/2018      A1c goal: < 7    Glucose monitoring frequency: testing blood sugars 3 times daily  trend: fasting blood sugars are 120-140.  After  Lunch is 160-220.  Dinner is 200's.   Hypoglycemic episodes: yes - has had some lows during the night.     Last Retinal Exam: 6/13/17 Provider: Chicho  Daily Foot Exam: yes  Routine Dental Exams: yes    Lab Results   Component Value Date/Time    MICROALBCALC 10 02/14/2015    MALBCRT 15 03/26/2014 07:05 AM    MICROALBUR 0.7 03/26/2014 07:05 AM        ACR Albumin/Creatinine Ratio goal <30     Diabetic complications: none    HTN:   Blood pressure goal <140/<80 .   Currently Rx ACE/ARB: No    Dyslipidemia:    Lab Results   Component Value Date/Time    CHOLSTRLTOT 55 02/14/2015    CHOLSTRLTOT 190 02/14/2015     02/14/2015     02/14/2015    HDL 55 02/14/2015    TRIGLYCERIDE 190 02/14/2015    TRIGLYCERIDE 132 02/14/2015       Lab Results   Component Value Date/Time    SODIUM 133 (L) 03/26/2014 07:06 AM    POTASSIUM 4.2 03/26/2014 07:06 AM    CHLORIDE 99 03/26/2014 07:06 AM    CO2 28 03/26/2014 07:06 AM    GLUCOSE 377 (H) 03/26/2014 07:06 AM    BUN 15 03/26/2014 07:06 AM    CREATININE 0.80 03/26/2014 07:06 AM     Lab Results   Component Value Date/Time    ALKPHOSPHAT 152 (H) 03/26/2014 07:06 AM    ASTSGOT 24 03/26/2014 07:06 AM    ALTSGPT 26 03/26/2014 07:06 AM    TBILIRUBIN 0.4 03/26/2014 07:06 AM        Currently Rx Statin: No      She  reports that she has never smoked. She has never used smokeless tobacco.    Objective:     Exam:  Monofilament: done  Monofilament testing with a 10 gram force: sensation intact: intact bilaterally  Visual Inspection: Feet without maceration, ulcers, fissures.  Pedal pulses: intact bilaterally      Plan:     - Diabetic diet discussed in detail-plate method.  - Home glucose monitoring.  - She  will test and log.    - She will walk for 20-30 minutes daily.  - Reviewed medications and advised to take metformin after meals to decrease   G.I.upset.   - Discussed importance of immunizations and yearly eye exams.   - Encouraged patient to attend diabetes education program.   -Educational material distributed.   - Advised daily foot exams. Educated on signs of infection.       Recommended medication changes: She is out of school and doing well.  She will try to keep her carbohydrates down to 30-40 grams at each meal.    Problem List Items Addressed This Visit        Unprioritized    Type II diabetes mellitus, uncontrolled (HCC)    Relevant Orders    Diabetic Monofilament LE Exam (Completed)    HEMOGLOBIN A1C reviewed ongoing diet exercise weight loss medication changes are  Doing well with the Trulicity 1.5 she is continuing on the Humalog and metformin.   Ongoing support and monitoring.  Reviewed diet.        Other Visit Diagnoses     Need for diphtheria-tetanus-pertussis (Tdap) vaccine        Relevant Orders    Tdap =>6yo IM    Hyperlipidemia, unspecified hyperlipidemia type        Relevant Orders    LIPID PROFILE  Labs reviewed she has not had her lipid panel done as she was not fasting at the time of her blood draw.  We will schedule that for next follow-up.        Patient was seen for 40 minutes face to face of which more than 50% of the time was spent in counseling and coordination of care regarding the above problems.

## 2018-07-26 RX ORDER — LEVOTHYROXINE SODIUM 0.12 MG/1
TABLET ORAL
Qty: 90 TAB | Refills: 3 | Status: SHIPPED | OUTPATIENT
Start: 2018-07-26 | End: 2019-05-14 | Stop reason: SDUPTHER

## 2018-07-26 NOTE — TELEPHONE ENCOUNTER
Was the patient seen in the last year in this department? Yes    Does patient have an active prescription for medications requested? No     Received Request Via: Pharmacy     Last OV 6/7/18, last labs 5/31/18

## 2018-09-06 ENCOUNTER — OFFICE VISIT (OUTPATIENT)
Dept: MEDICAL GROUP | Facility: PHYSICIAN GROUP | Age: 58
End: 2018-09-06
Payer: COMMERCIAL

## 2018-09-06 VITALS
HEIGHT: 67 IN | HEART RATE: 72 BPM | BODY MASS INDEX: 27.94 KG/M2 | SYSTOLIC BLOOD PRESSURE: 120 MMHG | DIASTOLIC BLOOD PRESSURE: 72 MMHG | OXYGEN SATURATION: 97 % | TEMPERATURE: 97 F | WEIGHT: 178 LBS

## 2018-09-06 LAB
HBA1C MFR BLD: 9.6 % (ref ?–5.8)
INT CON NEG: NORMAL
INT CON POS: NORMAL

## 2018-09-06 PROCEDURE — 99214 OFFICE O/P EST MOD 30 MIN: CPT | Performed by: INTERNAL MEDICINE

## 2018-09-06 PROCEDURE — 83036 HEMOGLOBIN GLYCOSYLATED A1C: CPT | Performed by: INTERNAL MEDICINE

## 2018-09-06 NOTE — PROGRESS NOTES
"RN-CDE Note    Subjective: A1C is still too high not taking her nightime metformin     Health changes since last visit/interval Hx: None    Medications (including changes made today)  Current Outpatient Prescriptions   Medication Sig Dispense Refill   • levothyroxine (SYNTHROID) 125 MCG Tab TAKE ONE TABLET BY MOUTH IN THE MORNING ON EMPTY STOMACH 90 Tab 3   • venlafaxine (EFFEXOR) 75 MG Tab Take 1 Tab by mouth every day. 90 Tab 1   • Insulin Lispro Prot & Lispro (75-25) 100 UNIT/ML Suspension Pen-injector Inject 50 Units as instructed 3 times a day. 45 mL 11   • Cholecalciferol (VITAMIN D3) 5000 units Tab Take 10,000 Units by mouth.     • Dulaglutide 1.5 MG/0.5ML Solution Pen-injector Inject 1.5 mg as instructed every 7 days. 4 PEN 11   • metformin ER (GLUCOPHAGE XR) 500 MG TABLET SR 24 HR Take 2 Tabs by mouth 2 times a day. 360 Tab 3   • Insulin Pen Needle 32G X 4 MM Misc 1 Each by Does not apply route 2 times daily, before breakfast and dinner. 200 Each 1   • Blood Glucose Monitoring Suppl SUPPLIES MISC Relion Test strips. Test 4 times daily. ICD 9 250.00 Type 2 diabetes insulin requiring 400 Strip 3   • Multiple Vitamin (MULTIVITAMINS PO) Take  by mouth.     • hydrocodone-acetaminophen (VICODIN) 5-500 MG TABS Take 1-2 Tabs by mouth every four hours as needed.       No current facility-administered medications for this visit.        Taking daily ASA: No  Taking above medications as prescribed: yes  SIDE EFFECTS: Patient denies side effects to medications    Exercise: moderate regular exercise, aerobic < 3 days a week  Diet: \"healthy\" diet  in general  Patient's body mass index is 27.88 kg/m². Exercise and nutrition counseling were performed at this visit.      Health Maintenance:   Health Maintenance Due   Topic Date Due   • IMM PNEUMOCOCCAL 19-64 (ADULT) MEDIUM RISK SERIES (1 of 1 - PPSV23) 07/29/1980   • MAMMOGRAM  07/29/2001   • COLONOSCOPY  07/29/2011   • IMM ZOSTER VACCINES (1 of 2) 07/29/2011   • FASTING " LIPID PROFILE  02/14/2016   • URINE ACR / MICROALBUMIN  02/14/2016   • IMM HEP B VACCINE (3 of 3 - Risk 3-dose series) 05/05/2016   • RETINAL SCREENING  06/13/2018   • A1C SCREENING  08/19/2018   • IMM INFLUENZA (1) 09/01/2018       Immunizations:   PPSV23: Up-to-date  Hovrvxc44: Up-to-date  Tdap: Up-to-date  Flu: Up-to-date  Hep B: Up-to-date    DM:   Last A1c:   Lab Results   Component Value Date/Time    HBA1C 9.6 09/06/2018 03:14 PM      A1C GOAL: < 7    Glucose monitoring frequency: not as much as she should  200s  Hypoglycemic episodes: yes -     Last Retinal Exam: on file and up-to-date  Daily Foot Exam: Yes   Routine Dental Exams: Yes    Lab Results   Component Value Date/Time    MICROALBCALC 10 02/14/2015    MALBCRT 15 03/26/2014 07:05 AM    MICROALBUR 0.7 03/26/2014 07:05 AM        ACR Albumin/Creatinine Ratio goal <30     HTN:   Blood pressure goal <140/<80 .   Currently Rx ACE/ARB: No    Dyslipidemia:    Lab Results   Component Value Date/Time    CHOLSTRLTOT 55 02/14/2015    CHOLSTRLTOT 190 02/14/2015     02/14/2015     02/14/2015    HDL 55 02/14/2015    TRIGLYCERIDE 190 02/14/2015    TRIGLYCERIDE 132 02/14/2015       Lab Results   Component Value Date/Time    SODIUM 133 (L) 03/26/2014 07:06 AM    POTASSIUM 4.2 03/26/2014 07:06 AM    CHLORIDE 99 03/26/2014 07:06 AM    CO2 28 03/26/2014 07:06 AM    GLUCOSE 377 (H) 03/26/2014 07:06 AM    BUN 15 03/26/2014 07:06 AM    CREATININE 0.80 03/26/2014 07:06 AM     Lab Results   Component Value Date/Time    ALKPHOSPHAT 152 (H) 03/26/2014 07:06 AM    ASTSGOT 24 03/26/2014 07:06 AM    ALTSGPT 26 03/26/2014 07:06 AM    TBILIRUBIN 0.4 03/26/2014 07:06 AM        Currently Rx Statin: No    She  reports that she has never smoked. She has never used smokeless tobacco.    Objective:     Exam:  Monofilament: not done        Plan:     Discussed and educated on:   - All medications, side effects and compliance (discussed carefully)  - Annual eye examinations at  Ophthalmology  - Diabetic Meal Plan: plate method  - Home glucose monitoring emphasized  - Weight control and daily exercise    Recommended medication changes: test blood sugars 4 times a day and start taking full dose of metformin    Reviewed recommendations by diabetic nurse, discussed with patient, encouraged ongoing diet exercise weight loss.  Discussed behavior modification.    Patient was seen for  45 minutes face to face of which more than 50% of the time was spent in counseling and coordination of care regarding the above problems.

## 2018-09-06 NOTE — TELEPHONE ENCOUNTER
Was the patient seen in the last year in this department? Yes    Does patient have an active prescription for medications requested? No     Received Request Via: Pharmacy     Last OV 6/7/18  Labs 9/3/18

## 2018-09-06 NOTE — ASSESSMENT & PLAN NOTE
Patient admits to not remembering the second metformin dose.  She is working on diet, exercise, her knee is aching more.

## 2018-09-07 RX ORDER — DULAGLUTIDE 1.5 MG/.5ML
INJECTION, SOLUTION SUBCUTANEOUS
Qty: 4 PEN | Refills: 11 | Status: SHIPPED | OUTPATIENT
Start: 2018-09-07 | End: 2019-07-15 | Stop reason: CLARIF

## 2018-09-10 RX ORDER — VENLAFAXINE 75 MG/1
TABLET ORAL
Qty: 90 TAB | Refills: 0 | Status: SHIPPED | OUTPATIENT
Start: 2018-09-10 | End: 2018-12-29 | Stop reason: SDUPTHER

## 2018-09-10 NOTE — TELEPHONE ENCOUNTER
Was the patient seen in the last year in this department? Yes    Does patient have an active prescription for medications requested? No     Received Request Via: Pharmacy      Pt met protocol?: Yes    OV 9/18

## 2018-12-31 NOTE — TELEPHONE ENCOUNTER
Was the patient seen in the last year in this department? Yes    Does patient have an active prescription for medications requested? No     Received Request Via: Pharmacy      Pt met protocol?: Yes    Pt last ov 9/2018

## 2019-01-01 RX ORDER — VENLAFAXINE 75 MG/1
TABLET ORAL
Qty: 90 TAB | Refills: 1 | Status: SHIPPED | OUTPATIENT
Start: 2019-01-01 | End: 2019-08-03 | Stop reason: SDUPTHER

## 2019-02-04 ENCOUNTER — TELEPHONE (OUTPATIENT)
Dept: MEDICAL GROUP | Facility: PHYSICIAN GROUP | Age: 59
End: 2019-02-04

## 2019-02-04 DIAGNOSIS — E11.649 UNCONTROLLED TYPE 2 DIABETES MELLITUS WITH HYPOGLYCEMIA, UNSPECIFIED HYPOGLYCEMIA COMA STATUS (HCC): ICD-10-CM

## 2019-02-04 NOTE — TELEPHONE ENCOUNTER
----- Message from Myesha Wright sent at 2/4/2019  7:50 AM PST -----  Regarding: FW: Referral Request  Contact: [phone]      ----- Message -----  From: Gem Barnett  Sent: 2/2/2019   8:49 PM  To: [facility]  Subject: Referral Request                                 Gem Barnett would like to request a referral.  Reason: Endocrinologist  Requested provider:   Comment:  Would like to see for unregulated, diabetic issues.

## 2019-03-04 ENCOUNTER — OFFICE VISIT (OUTPATIENT)
Dept: URGENT CARE | Facility: PHYSICIAN GROUP | Age: 59
End: 2019-03-04
Payer: COMMERCIAL

## 2019-03-04 VITALS
HEART RATE: 108 BPM | SYSTOLIC BLOOD PRESSURE: 118 MMHG | DIASTOLIC BLOOD PRESSURE: 76 MMHG | TEMPERATURE: 97.3 F | OXYGEN SATURATION: 96 % | WEIGHT: 174 LBS | HEIGHT: 67 IN | BODY MASS INDEX: 27.31 KG/M2 | RESPIRATION RATE: 16 BRPM

## 2019-03-04 DIAGNOSIS — E11.649 UNCONTROLLED TYPE 2 DIABETES MELLITUS WITH HYPOGLYCEMIA, UNSPECIFIED HYPOGLYCEMIA COMA STATUS (HCC): ICD-10-CM

## 2019-03-04 DIAGNOSIS — R06.2 WHEEZING: ICD-10-CM

## 2019-03-04 DIAGNOSIS — J98.8 RTI (RESPIRATORY TRACT INFECTION): ICD-10-CM

## 2019-03-04 PROCEDURE — 99214 OFFICE O/P EST MOD 30 MIN: CPT | Performed by: FAMILY MEDICINE

## 2019-03-04 RX ORDER — ALBUTEROL SULFATE 90 UG/1
2 AEROSOL, METERED RESPIRATORY (INHALATION) EVERY 6 HOURS PRN
Qty: 8.5 G | Refills: 3 | Status: SHIPPED | OUTPATIENT
Start: 2019-03-04 | End: 2020-04-06 | Stop reason: SDUPTHER

## 2019-03-04 RX ORDER — DOXYCYCLINE HYCLATE 100 MG
100 TABLET ORAL EVERY 12 HOURS
Qty: 14 TAB | Refills: 0 | Status: SHIPPED | OUTPATIENT
Start: 2019-03-04 | End: 2019-03-11

## 2019-03-04 ASSESSMENT — ENCOUNTER SYMPTOMS
FOCAL WEAKNESS: 0
FEVER: 0
ORTHOPNEA: 0
CHILLS: 0
HEMOPTYSIS: 0
COUGH: 1
SHORTNESS OF BREATH: 0
DIZZINESS: 0

## 2019-03-04 NOTE — PROGRESS NOTES
Subjective:      Gem Barnett is a 58 y.o. female who presents with Cough      - This is a very pleasant, well and non-toxic appearing 58 y.o. female with complaints of 1 wk w/ cough/chest congestion. No NVFC. otc meds not helping    - Hx DM, sugars running 300's past wk. We discussed f/u or contact pcp/endo's office to better titrate her meds.           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    MEDS:    Current Outpatient Prescriptions:   •  albuterol (PROAIR HFA) 108 (90 Base) MCG/ACT Aero Soln inhalation aerosol, Inhale 2 Puffs by mouth every 6 hours as needed for Shortness of Breath., Disp: 8.5 g, Rfl: 3  •  Hydrocod Polst-CPM Polst ER (TUSSIONEX) 10-8 MG/5ML Suspension Extended Release, Take 5 mL by mouth every 12 hours as needed for Cough for up to 7 days., Disp: 70 mL, Rfl: 0  •  doxycycline (VIBRAMYCIN) 100 MG Tab, Take 1 Tab by mouth every 12 hours for 7 days., Disp: 14 Tab, Rfl: 0  •  venlafaxine (EFFEXOR) 75 MG Tab, TAKE 1 TABLET BY MOUTH ONCE DAILY, Disp: 90 Tab, Rfl: 1  •  metFORMIN ER (GLUCOPHAGE XR) 500 MG TABLET SR 24 HR, TAKE TWO TABLETS BY MOUTH TWICE DAILY, Disp: 1360 Tab, Rfl: 1  •  TRULICITY 1.5 MG/0.5ML Solution Pen-injector, INJECT ONE PEN SUBCUTANEOUSLY ONCE A WEEK, Disp: 4 PEN, Rfl: 11  •  levothyroxine (SYNTHROID) 125 MCG Tab, TAKE ONE TABLET BY MOUTH IN THE MORNING ON EMPTY STOMACH, Disp: 90 Tab, Rfl: 3  •  Insulin Lispro Prot & Lispro (75-25) 100 UNIT/ML Suspension Pen-injector, Inject 50 Units as instructed 3 times a day., Disp: 45 mL, Rfl: 11  •  Cholecalciferol (VITAMIN D3) 5000 units Tab,  "Take 10,000 Units by mouth., Disp: , Rfl:   •  Insulin Pen Needle 32G X 4 MM Misc, 1 Each by Does not apply route 2 times daily, before breakfast and dinner., Disp: 200 Each, Rfl: 1  •  Blood Glucose Monitoring Suppl SUPPLIES MISC, Relion Test strips. Test 4 times daily. ICD 9 250.00 Type 2 diabetes insulin requiring, Disp: 400 Strip, Rfl: 3  •  hydrocodone-acetaminophen (VICODIN) 5-500 MG TABS, Take 1-2 Tabs by mouth every four hours as needed., Disp: , Rfl:   •  Multiple Vitamin (MULTIVITAMINS PO), Take  by mouth., Disp: , Rfl:     ** I have documented what I find to be significant in regards to past medical, social, family and surgical history  in my HPI or under PMH/PSH/FH review section, otherwise it is contributory **         HPI    Review of Systems   Constitutional: Negative for chills and fever.   Respiratory: Positive for cough. Negative for hemoptysis and shortness of breath.    Cardiovascular: Negative for chest pain and orthopnea.   Neurological: Negative for dizziness and focal weakness.          Objective:     /76   Pulse (!) 108   Temp 36.3 °C (97.3 °F)   Resp 16   Ht 1.702 m (5' 7\")   Wt 78.9 kg (174 lb)   LMP 06/30/2011   SpO2 96%   BMI 27.25 kg/m²      Physical Exam   Constitutional: She appears well-developed. No distress.   HENT:   Head: Normocephalic and atraumatic.   Neck: Neck supple.   Cardiovascular: Regular rhythm.    No murmur heard.  Pulmonary/Chest: Effort normal. No respiratory distress. She has wheezes.   Neurological: She is alert. She exhibits normal muscle tone.   Skin: Skin is warm and dry.   Psychiatric: She has a normal mood and affect. Judgment normal.   Nursing note and vitals reviewed.              Assessment/Plan:         1. RTI (respiratory tract infection)  albuterol (PROAIR HFA) 108 (90 Base) MCG/ACT Aero Soln inhalation aerosol    Hydrocod Polst-CPM Polst ER (TUSSIONEX) 10-8 MG/5ML Suspension Extended Release    doxycycline (VIBRAMYCIN) 100 MG Tab   2. " Uncontrolled type 2 diabetes mellitus with hypoglycemia, unspecified hypoglycemia coma status (HCC)     3. Wheezing               Dx & d/c instructions discussed w/ patient and/or family members.     ER precautions (worsening signs symptoms and when to go to ER) discussed.    Follow up w/ PCP in 2-3 days to make sure symptoms improving and no further intervention/treatment and/or work-up needed was advised, ER if feeling worse or not improving in 2 days.    Possible side effects (i.e. Rash, GI upset/constipation, sedation, elevation of BP or sugars) of any medications given discussed.     Patient left in stable condition

## 2019-03-11 ENCOUNTER — OFFICE VISIT (OUTPATIENT)
Dept: URGENT CARE | Facility: PHYSICIAN GROUP | Age: 59
End: 2019-03-11
Payer: COMMERCIAL

## 2019-03-11 VITALS
DIASTOLIC BLOOD PRESSURE: 64 MMHG | TEMPERATURE: 97.6 F | SYSTOLIC BLOOD PRESSURE: 122 MMHG | WEIGHT: 174 LBS | HEART RATE: 76 BPM | OXYGEN SATURATION: 95 % | HEIGHT: 67 IN | BODY MASS INDEX: 27.31 KG/M2 | RESPIRATION RATE: 16 BRPM

## 2019-03-11 DIAGNOSIS — R05.9 COUGH: ICD-10-CM

## 2019-03-11 PROCEDURE — 99214 OFFICE O/P EST MOD 30 MIN: CPT | Performed by: PHYSICIAN ASSISTANT

## 2019-03-11 RX ORDER — BENZONATATE 200 MG/1
200 CAPSULE ORAL 3 TIMES DAILY PRN
Qty: 60 CAP | Refills: 0 | Status: SHIPPED | OUTPATIENT
Start: 2019-03-11 | End: 2019-06-17

## 2019-03-11 NOTE — LETTER
March 11, 2019         Patient: Gem Barnett   YOB: 1960   Date of Visit: 3/11/2019           To Whom it May Concern:    Gem Barnett was seen in my clinic on 3/11/2019. She may return to work on 03/12/2019, please excuse any recent absences.    If you have any questions or concerns, please don't hesitate to call.        Sincerely,           Frankie Carey P.A.-C.  Electronically Signed

## 2019-03-11 NOTE — PROGRESS NOTES
Chief Complaint   Patient presents with   • Cough       HISTORY OF PRESENT ILLNESS: Patient is a 58 y.o. female who presents today because she is getting over a respiratory illness.  She was seen a week ago and put on an antibiotic, inhaler and cough medication.  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   • Rash 09/22/2015   • Encounter for long-term (current) use of insulin ([facility]) 04/08/2015   • Hand pain, left 04/10/2014   • Type II diabetes mellitus, uncontrolled ([facility]) 04/02/2014   • Stress at home 03/20/2014   • Postmenopausal 03/20/2014   • Hypothyroid    • Low back pain        Allergies:Farxiga [dapagliflozin]; Invokana [canagliflozin]; and Jardiance [empagliflozin]    Current Outpatient Prescriptions Ordered in [facility]   Medication Sig Dispense Refill   • benzonatate (TESSALON) 200 MG capsule Take 1 Cap by mouth 3 times a day as needed for Cough. 60 Cap 0   • albuterol (PROAIR HFA) 108 (90 Base) MCG/ACT Aero Soln inhalation aerosol Inhale 2 Puffs by mouth every 6 hours as needed for Shortness of Breath. 8.5 g 3   • Hydrocod Polst-CPM Polst ER (TUSSIONEX) 10-8 MG/5ML Suspension Extended Release Take 5 mL by mouth every 12 hours as needed for Cough for up to 7 days. 70 mL 0   • doxycycline (VIBRAMYCIN) 100 MG Tab Take 1 Tab by mouth every 12 hours for 7 days. 14 Tab 0   • venlafaxine (EFFEXOR) 75 MG Tab TAKE 1 TABLET BY MOUTH ONCE DAILY 90 Tab 1   • metFORMIN ER (GLUCOPHAGE XR) 500 MG TABLET SR 24 HR TAKE TWO TABLETS BY MOUTH TWICE DAILY 1360 Tab 1   • TRULICITY 1.5 MG/0.5ML Solution Pen-injector INJECT ONE PEN SUBCUTANEOUSLY ONCE A WEEK 4 PEN 11   • levothyroxine (SYNTHROID) 125 MCG Tab TAKE ONE  TABLET BY MOUTH IN THE MORNING ON EMPTY STOMACH 90 Tab 3   • Insulin Lispro Prot & Lispro (75-25) 100 UNIT/ML Suspension Pen-injector Inject 50 Units as instructed 3 times a day. 45 mL 11   • Cholecalciferol (VITAMIN D3) 5000 units Tab Take 10,000 Units by mouth.     • Insulin Pen Needle 32G X 4 MM Misc 1 Each by Does not apply route 2 times daily, before breakfast and dinner. 200 Each 1   • Blood Glucose Monitoring Suppl SUPPLIES MISC Relion Test strips. Test 4 times daily. ICD 9 250.00 Type 2 diabetes insulin requiring 400 Strip 3   • hydrocodone-acetaminophen (VICODIN) 5-500 MG TABS Take 1-2 Tabs by mouth every four hours as needed.     • Multiple Vitamin (MULTIVITAMINS PO) Take  by mouth.       No current Epic-ordered facility-administered medications on file.        Past Medical History:   Diagnosis Date   • Anxious depression 2016   • Atrophic vaginitis 2016   • Avulsion of right knee 2016   • Diabetes    • HPV in female 2016   • Hypothyroid    • LBP (low back pain)    • Rash 2015   • Stress incontinence of urine 2017       Social History   Substance Use Topics   • Smoking status: Never Smoker   • Smokeless tobacco: Never Used   • Alcohol use 0.6 oz/week     1 Glasses of wine per week      Comment: glass of wine a few times a year       Family Status   Relation Status   • Mo Alive   • Bro  at age 46        trauma   • Fa Alive   • Son Alive   • Kike Alive   • Kike Alive     Family History   Problem Relation Age of Onset   • Diabetes Mother    • Diabetes Brother         type 1,    • Hypertension Brother        ROS:  Review of Systems   Constitutional: Negative for fever, chills, weight loss and malaise/fatigue.   HENT: Negative for ear pain, nosebleeds, congestion, sore throat and neck pain.    Eyes: Negative for blurred vision.   Respiratory: Positive for cough, without sputum production, shortness of breath and wheezing.    Cardiovascular: Negative for chest pain,  "palpitations, orthopnea and leg swelling.   Gastrointestinal: Negative for heartburn, nausea, vomiting and abdominal pain.   Genitourinary: Negative for dysuria, urgency and frequency.     Exam:  Blood pressure 122/64, pulse 76, temperature 36.4 °C (97.6 °F), temperature source Temporal, resp. rate 16, height 1.702 m (5' 7\"), weight 78.9 kg (174 lb), last menstrual period 06/30/2011, SpO2 95 %.  General:  Well nourished, well developed female in NAD, dry cough in the office  Head:Normocephalic, atraumatic  Eyes: PERRLA, EOM within normal limits, no conjunctival injection, no scleral icterus, visual fields and acuity grossly intact.  Ears: Normal shape and symmetry, no tenderness, no discharge. External canals are without any significant edema or erythema. Tympanic membranes are without any inflammation, no effusion. Gross auditory acuity is intact  Nose: Symmetrical without tenderness, no discharge.  Mouth: reasonable hygiene, no erythema exudates or tonsillar enlargement.  Neck: no masses, range of motion within normal limits, no tracheal deviation. No obvious thyroid enlargement.  Pulmonary: chest is symmetrical with respiration, no wheezes, crackles, or rhonchi.  Cardiovascular: regular rate and rhythm without murmurs, rubs, or gallops.  Extremities: no clubbing, cyanosis, or edema.    Please note that this dictation was created using voice recognition software. I have made every reasonable attempt to correct obvious errors, but I expect that there are errors of grammar and possibly content that I did not discover before finalizing the note.    Assessment/Plan:  1. Cough  benzonatate (TESSALON) 200 MG capsule   Finish all current medication as prescribed    Followup with primary care in the next 7-10 days if not significantly improving, return to the urgent care or go to the emergency room sooner for any worsening of symptoms.       "

## 2019-03-13 ENCOUNTER — TELEPHONE (OUTPATIENT)
Dept: URGENT CARE | Facility: PHYSICIAN GROUP | Age: 59
End: 2019-03-13

## 2019-03-14 NOTE — TELEPHONE ENCOUNTER
OK to reach out to her and see if we can get her into an OV.    Option for MD at other urgent care or at [place]  Has she set up with endocrinology Dr Holden (requested referral 2/19)? Or follow up with us on her DM?

## 2019-03-20 ENCOUNTER — OFFICE VISIT (OUTPATIENT)
Dept: MEDICAL GROUP | Facility: PHYSICIAN GROUP | Age: 59
End: 2019-03-20
Payer: COMMERCIAL

## 2019-03-20 ENCOUNTER — TELEPHONE (OUTPATIENT)
Dept: MEDICAL GROUP | Facility: PHYSICIAN GROUP | Age: 59
End: 2019-03-20

## 2019-03-20 VITALS
HEART RATE: 99 BPM | HEIGHT: 67 IN | OXYGEN SATURATION: 98 % | SYSTOLIC BLOOD PRESSURE: 140 MMHG | TEMPERATURE: 98.2 F | BODY MASS INDEX: 27.62 KG/M2 | RESPIRATION RATE: 16 BRPM | WEIGHT: 176 LBS | DIASTOLIC BLOOD PRESSURE: 70 MMHG

## 2019-03-20 DIAGNOSIS — J45.20 MILD INTERMITTENT REACTIVE AIRWAY DISEASE WITHOUT COMPLICATION: ICD-10-CM

## 2019-03-20 DIAGNOSIS — R05.9 COUGH: ICD-10-CM

## 2019-03-20 DIAGNOSIS — E11.649 UNCONTROLLED TYPE 2 DIABETES MELLITUS WITH HYPOGLYCEMIA, UNSPECIFIED HYPOGLYCEMIA COMA STATUS (HCC): ICD-10-CM

## 2019-03-20 PROBLEM — J45.909 MILD REACTIVE AIRWAYS DISEASE: Status: ACTIVE | Noted: 2019-03-20

## 2019-03-20 PROCEDURE — 99214 OFFICE O/P EST MOD 30 MIN: CPT | Mod: 25 | Performed by: INTERNAL MEDICINE

## 2019-03-20 PROCEDURE — 94640 AIRWAY INHALATION TREATMENT: CPT | Performed by: INTERNAL MEDICINE

## 2019-03-20 RX ORDER — HYDROCODONE BITARTRATE AND ACETAMINOPHEN 5; 325 MG/1; MG/1
0.5 TABLET ORAL EVERY 6 HOURS PRN
Qty: 10 TAB | Refills: 0 | Status: SHIPPED | OUTPATIENT
Start: 2019-03-20 | End: 2019-03-25

## 2019-03-20 RX ORDER — IPRATROPIUM BROMIDE AND ALBUTEROL SULFATE 2.5; .5 MG/3ML; MG/3ML
3 SOLUTION RESPIRATORY (INHALATION) ONCE
Status: COMPLETED | OUTPATIENT
Start: 2019-03-20 | End: 2019-03-20

## 2019-03-20 RX ORDER — AMOXICILLIN AND CLAVULANATE POTASSIUM 875; 125 MG/1; MG/1
1 TABLET, FILM COATED ORAL 2 TIMES DAILY
Qty: 14 TAB | Refills: 0 | Status: SHIPPED | OUTPATIENT
Start: 2019-03-20 | End: 2019-06-17

## 2019-03-20 RX ORDER — IPRATROPIUM BROMIDE AND ALBUTEROL SULFATE 2.5; .5 MG/3ML; MG/3ML
3 SOLUTION RESPIRATORY (INHALATION) 4 TIMES DAILY
Qty: 120 BULLET | Refills: 1 | Status: SHIPPED | OUTPATIENT
Start: 2019-03-20 | End: 2021-11-24 | Stop reason: SDUPTHER

## 2019-03-20 RX ADMIN — IPRATROPIUM BROMIDE AND ALBUTEROL SULFATE 3 ML: 2.5; .5 SOLUTION RESPIRATORY (INHALATION) at 10:41

## 2019-03-20 NOTE — PROGRESS NOTES
Chief Complaint   Patient presents with   • Cough     [facility] cough/ chest congestion        HISTORY OF PRESENT ILLNESS: Patient is a 58 y.o. female established patient who presents today to discuss the medical issues below.    Mild reactive airways disease  Patient reports URI cough x 3-4 weeks.  She has been seen in  x 2, first visit received antibiotics and inhaler, second visit given tessalon.  She reports she is better, she has been able to get to work, now with persistent cough, yesterday and today she has been using her child's nebulizer and reports now she has dry nonproductive cough.  No fevers but not taking her temp. Not a smoker, no prior history of reactive airways, however, child does have asthma.      Type II diabetes mellitus, uncontrolled (CMS-[facility])  Patient reports her FS were in the 400- 500 range while sick, now back down to her usual range which is in the 120 range.  She is using the Lispro 75/25 at 50 u tid.       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   • Rash 09/22/2015   • Encounter for long-term (current) use of insulin ([facility]) 04/08/2015   • Hand pain, left 04/10/2014   • Type II diabetes mellitus, uncontrolled ([facility]) 04/02/2014   • Stress at home 03/20/2014   • Postmenopausal 03/20/2014   • Hypothyroid    • Low back pain        Allergies:Farxiga [dapagliflozin]; Invokana [canagliflozin]; and Jardiance [empagliflozin]    Current Outpatient Prescriptions   Medication Sig Dispense Refill   • HYDROcodone-acetaminophen (NORCO) 5-325 MG Tab per tablet Take 0.5 Tabs by mouth every 6 hours as needed for up to 5 days. 10 Tab 0   • ipratropium-albuterol (DUONEB) 0.5-2.5 (3) MG/3ML nebulizer solution 3 mL by Nebulization route 4 times a day. 120  Bullet 1   • Nebulizers (COMPRESSOR/NEBULIZER) Misc 1 Each by Does not apply route Once for 1 dose. 1 Each 0   • amoxicillin-clavulanate (AUGMENTIN) 875-125 MG Tab Take 1 Tab by mouth 2 times a day. 14 Tab 0   • venlafaxine (EFFEXOR) 75 MG Tab TAKE 1 TABLET BY MOUTH ONCE DAILY 90 Tab 1   • metFORMIN ER (GLUCOPHAGE XR) 500 MG TABLET SR 24 HR TAKE TWO TABLETS BY MOUTH TWICE DAILY 1360 Tab 1   • TRULICITY 1.5 MG/0.5ML Solution Pen-injector INJECT ONE PEN SUBCUTANEOUSLY ONCE A WEEK 4 PEN 11   • levothyroxine (SYNTHROID) 125 MCG Tab TAKE ONE TABLET BY MOUTH IN THE MORNING ON EMPTY STOMACH 90 Tab 3   • Insulin Lispro Prot & Lispro (75-25) 100 UNIT/ML Suspension Pen-injector Inject 50 Units as instructed 3 times a day. 45 mL 11   • Cholecalciferol (VITAMIN D3) 5000 units Tab Take 10,000 Units by mouth.     • Insulin Pen Needle 32G X 4 MM Misc 1 Each by Does not apply route 2 times daily, before breakfast and dinner. 200 Each 1   • Blood Glucose Monitoring Suppl SUPPLIES MISC Relion Test strips. Test 4 times daily. ICD 9 250.00 Type 2 diabetes insulin requiring 400 Strip 3   • Multiple Vitamin (MULTIVITAMINS PO) Take  by mouth.     • benzonatate (TESSALON) 200 MG capsule Take 1 Cap by mouth 3 times a day as needed for Cough. (Patient not taking: Reported on 3/20/2019) 60 Cap 0   • albuterol (PROAIR HFA) 108 (90 Base) MCG/ACT Aero Soln inhalation aerosol Inhale 2 Puffs by mouth every 6 hours as needed for Shortness of Breath. (Patient not taking: Reported on 3/20/2019) 8.5 g 3   • hydrocodone-acetaminophen (VICODIN) 5-500 MG TABS Take 1-2 Tabs by mouth every four hours as needed.       Current Facility-Administered Medications   Medication Dose Route Frequency Provider Last Rate Last Dose   • ipratropium-albuterol (DUONEB) nebulizer solution  3 mL Nebulization Once Marleny VARGAS M.D.             Past Medical History:   Diagnosis Date   • Anxious depression 2/26/2016   • Atrophic vaginitis 2/26/2016   • Avulsion of right  "knee 2016   • Diabetes    • HPV in female 2016   • Hypothyroid    • LBP (low back pain)    • Rash 2015   • Stress incontinence of urine 2017       Social History   Substance Use Topics   • Smoking status: Never Smoker   • Smokeless tobacco: Never Used   • Alcohol use 0.6 oz/week     1 Glasses of wine per week      Comment: glass of wine a few times a year       Family Status   Relation Status   • Mo Alive   • Bro  at age 46        trauma   • Fa Alive   • Son Alive   • Kike Alive   • Kike Alive     Family History   Problem Relation Age of Onset   • Diabetes Mother    • Diabetes Brother         type 1,    • Hypertension Brother        ROS:    Cardiovascular: Negative for chest pain, palpitations, orthopnea, dyspnea with exertion or edema.   Gastrointestinal: Negative for GI upset, nausea, vomiting, abdominal pain, constipation or diarrhea.   Genitourinary: Negative for dysuria, urgency, hesitancy or frequency.       Exam:    Blood pressure 140/70, pulse 99, temperature 36.8 °C (98.2 °F), temperature source Temporal, resp. rate 16, height 1.702 m (5' 7\"), weight 79.8 kg (176 lb), last menstrual period 2011, SpO2 98 %.  General:  Well nourished, well developed female in NAD.  HENT: Normocephalic, bilateral TMs are intact, nasal and oral mucosa with no lesions,   Neck: Supple without bruit. Thyroid is not enlarged.  Pulmonary: Initial exam reveals minimal air motion and no audible rhonchi rales or wheezes.  After DuoNeb therapy good air motion with no rhonchi rales wheezes.  Cardiovascular: Regular rate and rhythm without murmur.   Abdomen: Normal bowel sounds soft and nontender no palpable liver spleen bladder mass.  Extremities: No LE edema noted.  Neuro: Grossly nonfocal.  Psych: Alert and oriented to person, place, and time. Appropriate mood and conversation.        This dictation was created using voice recognition software. I have made reasonable attempts to correct errors, " however, errors of grammar and content may exist.          Assessment/Plan:    1. Mild intermittent reactive airway disease without complication  Substantial improvement in air motion and audible airflow with DuoNeb therapy.  She has a cough which she is not examined.  Considering differential would recommend a chest x-ray institute DuoNeb therapy 4 times daily empiric course of antibiotics as Augmentin.  We are going to avoid steroids because of the effect on the diabetes.  Minimal cough suppression with hydrocodone 2.5 mg every 6 hours as needed cough.  - DX-CHEST-2 VIEWS; Future  - ipratropium-albuterol (DUONEB) 0.5-2.5 (3) MG/3ML nebulizer solution; 3 mL by Nebulization route 4 times a day.  Dispense: 120 Bullet; Refill: 1  - Nebulizers (COMPRESSOR/NEBULIZER) Misc; 1 Each by Does not apply route Once for 1 dose.  Dispense: 1 Each; Refill: 0  - Consent for Opiate Prescription    2. Uncontrolled type 2 diabetes mellitus with hypoglycemia, unspecified hypoglycemia coma status (HCC)  Poorly controlled recent course of steroids.  Schedule for diabetic nurse follow-up.    3. Cough  For cough suppression Tessalon on effective.  Avoiding sugar containing cough syrup.  Will utilize the hydrocodone tablets at 1/2 tablet every 6 as needed dispensing #10.  No refill.  Reviewed narcotic contract.  - HYDROcodone-acetaminophen (NORCO) 5-325 MG Tab per tablet; Take 0.5 Tabs by mouth every 6 hours as needed for up to 5 days.  Dispense: 10 Tab; Refill: 0  - ipratropium-albuterol (DUONEB) 0.5-2.5 (3) MG/3ML nebulizer solution; 3 mL by Nebulization route 4 times a day.  Dispense: 120 Bullet; Refill: 1  - Nebulizers (COMPRESSOR/NEBULIZER) Misc; 1 Each by Does not apply route Once for 1 dose.  Dispense: 1 Each; Refill: 0  - Consent for Opiate Prescription    Patient was seen for 25 minutes face to face of which more than 50% of the time was spent in counseling and coordination of care regarding the above problems.

## 2019-03-20 NOTE — ASSESSMENT & PLAN NOTE
Patient reports her FS were in the 400- 500 range while sick, now back down to her usual range which is in the 120 range.  She is using the Lispro 75/25 at 50 u tid.

## 2019-03-20 NOTE — TELEPHONE ENCOUNTER
1. Caller Name: [facility]                      Call Back Number: [phone]    2. Message: [facility] called regarding pts Norco prescription.  They said the DX code was for a cough and Norco is usually not prescribed for a cough.  Is there another DX code that I can give them for the NOrco Prescription?  Please advise     3. Patient approves office to leave a detailed voicemail/MyChart message: N\A

## 2019-03-20 NOTE — ASSESSMENT & PLAN NOTE
Patient reports URI cough x 3-4 weeks.  She has been seen in  x 2, first visit received antibiotics and inhaler, second visit given tessalon.  She reports she is better, she has been able to get to work, now with persistent cough, yesterday and today she has been using her child's nebulizer and reports now she has dry nonproductive cough.  No fevers but not taking her temp. Not a smoker, no prior history of reactive airways, however, child does have asthma.

## 2019-03-21 NOTE — TELEPHONE ENCOUNTER
Spoke with pharmacist  Concern there was that the patient states she was having chest pain and that our rx indicated cough as the reason for the hydrocodone.      Our records indicate cough severe enough to interfere with sleep, patient with DM, want suppression without glucose effect.      Please contact the patient, clarify if having any chest pain other than with the cough, if so needs ER visit.  If pain isolated to cough then no other treatment needed.

## 2019-03-21 NOTE — TELEPHONE ENCOUNTER
Left another VM with patient letting her know that her nebulizer machine was called into [facility] and that her [place] [facility] is ready for pickup

## 2019-04-12 RX ORDER — INSULIN LISPRO 100 [IU]/ML
INJECTION, SUSPENSION SUBCUTANEOUS
Qty: 9 PEN | Refills: 0 | Status: SHIPPED | OUTPATIENT
Start: 2019-04-12 | End: 2019-05-14 | Stop reason: SDUPTHER

## 2019-04-12 NOTE — TELEPHONE ENCOUNTER
Was the patient seen in the last year in this department? Yes    Does patient have an active prescription for medications requested? No     Received Request Via: Pharmacy      Pt met protocol?: Yes, OV last month   Lab Results   Component Value Date/Time    HBA1C 9.6 09/06/2018 03:14 PM

## 2019-06-16 NOTE — PROGRESS NOTES
New Patient Consult Note  Primary care physician: Marleny VARGAS M.D.    Reason for consult: Management of Uncontrolled Type 2 Diabetes, Hypothyroidism, and Vitamin D Deficiency.  She will be changing from [employer] insurance to [employer] on July 1, 2019.    Hypothyroidism:  Currently taking Levothyroxine 125 mcg daily.    Vitamin D Deficiency:  Currently taking Vitamin D 10,000 units daily.    HPI:  Gem Barnett is a 58 y.o. old patient who comes in today for evaluation of above stated problem.    Most Recent HbA1c:   Lab Results   Component Value Date/Time    HBA1C 10.5 (A) 06/17/2019 04:42 PM        Current Diabetes Regimen:  GLP-1 Agent: Dulaglutide 1.5 mg once weekly   SGLT-2 Inhibitor:  She has tried all the SGLT-2 Inhibitors and they make her itch.  Mixed Insulin: Humalog 75/25 30 to 50 units TID  Other: Metformin  mg 2 daily.  Feels nauseated if tries to take more.   Testing blood sugars 5 times daily.  Blood sugars 170-300's    Hypoglycemia:  Has had a few blood sugars in the 50's during the night.      ROS:  Constitutional: No weight loss  Cardiac: No palpitations or racing heart  Resp: No shortness of breath  Neuro: No numbness or tinging in feet  Endo: No heat or cold intolerance, no polyuria or polydipsia  All other systems were reviewed and were negative.    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     Social History Main Topics   • Smoking status: Never Smoker   • Smokeless tobacco: Never Used   • Alcohol use 0.6 oz/week     1 Glasses of wine per week      Comment: glass of wine a few times a year   • Drug use: No   • Sexual activity: Not Currently     Other Topics Concern   • Not on file     Social History Narrative   • No narrative on file       Family History:  Family History   Problem Relation Age of Onset   • Diabetes Mother    • Diabetes Brother         type 1,    • Hypertension Brother        Medications:    Current Outpatient Prescriptions:   •  Cyanocobalamin (VITAMIN B 12 PO), Take 1,000 mcg by mouth every day., Disp: , Rfl:   •  Insulin Pen Needle 32 G x 4 mm, 1 Each by Does not apply route 3 times a day., Disp: 300 Each, Rfl: 3  •  levothyroxine (SYNTHROID) 125 MCG Tab, TAKE 1 TABLET BY MOUTH IN THE MORNING ON AN EMPTY STOMACH, Disp: 90 Tab, Rfl: 3  •  HUMALOG MIX 75/25 KWIKPEN (75-25) 100 UNIT/ML Suspension Pen-injector,  INJECT 50 UNITS SUBCUTANEOUSLY THREE TIMES DAILY AS DIRECTED, Disp: 3 PEN, Rfl: 3  •  ipratropium-albuterol (DUONEB) 0.5-2.5 (3) MG/3ML nebulizer solution, 3 mL by Nebulization route 4 times a day., Disp: 120 Bullet, Rfl: 1  •  albuterol (PROAIR HFA) 108 (90 Base) MCG/ACT Aero Soln inhalation aerosol, Inhale 2 Puffs by mouth every 6 hours as needed for Shortness of Breath., Disp: 8.5 g, Rfl: 3  •  metFORMIN ER (GLUCOPHAGE XR) 500 MG TABLET SR 24 HR, TAKE TWO TABLETS BY MOUTH TWICE DAILY, Disp: 1360 Tab, Rfl: 1  •  TRULICITY 1.5 MG/0.5ML Solution Pen-injector, INJECT ONE PEN  "SUBCUTANEOUSLY ONCE A WEEK, Disp: 4 PEN, Rfl: 11  •  Cholecalciferol (VITAMIN D3) 5000 units Tab, Take 10,000 Units by mouth., Disp: , Rfl:   •  Insulin Pen Needle 32G X 4 MM Misc, 1 Each by Does not apply route 2 times daily, before breakfast and dinner., Disp: 200 Each, Rfl: 1  •  Blood Glucose Monitoring Suppl SUPPLIES MISC, Relion Test strips. Test 4 times daily. ICD 9 250.00 Type 2 diabetes insulin requiring, Disp: 400 Strip, Rfl: 3  •  Multiple Vitamin (MULTIVITAMINS PO), Take  by mouth., Disp: , Rfl:   •  venlafaxine (EFFEXOR) 75 MG Tab, TAKE 1 TABLET BY MOUTH ONCE DAILY (Patient not taking: Reported on 6/17/2019), Disp: 90 Tab, Rfl: 1  •  hydrocodone-acetaminophen (VICODIN) 5-500 MG TABS, Take 1-2 Tabs by mouth every four hours as needed., Disp: , Rfl:     Labs: Reviewed    Physical Examination:  Vital signs: /60   Pulse 90   Ht 1.702 m (5' 7\")   Wt 82.6 kg (182 lb)   LMP 06/30/2011   SpO2 98%   BMI 28.51 kg/m²  Body mass index is 28.51 kg/m². Patient's body mass index is 28.51 kg/m². Exercise and nutrition counseling were performed at this visit.  General: No apparent distress, cooperative  Eyes: No scleral icterus or discharge  ENMT: Normal on external inspection of nose, lips, normal thyroid exam  Neck: No abnormal masses on inspection  Resp: Normal effort, clear to auscultation bilaterally   CVS: Regular rate and rhythm, S1 S2 normal, no murmur   Extremities: No edema  Abdomen: abdominal obesity present  Neuro: Alert and oriented  Skin: No rash  Psych: Normal mood and affect, intact memory and able to make informed decisions    Foot Exam:  Monofilament: done  Monofilament testing with a 10 gram force: sensation intact: intact bilaterally  Visual Inspection: Feet without maceration, ulcers, fissures.  Pedal pulses: intact bilaterally    Assessment and Plan:    1. Uncontrolled type 2 diabetes mellitus with hyperglycemia (HCC)  Continue current regimen for now.  The following was reviewed  - " Discussed diabetic diet discussed in detail-plate method.  - She will test before meals and log .  - She will walk for 20-30 minutes daily.  Lots of yard work.  - Reviewed medications and advised how to take   - Discussed importance of immunizations and yearly eye exams. She saw Dr. Valentino on 6/13/17.  - Advised daily foot exams. Educated on signs of infection.   - Educated on need to stay well hydrated with water.  - Educated to call with any questions or problems.    2. Acquired hypothyroidism  Continue levothyroxine    3. Vitamin D deficiency  continue vitamin D supplementation    Return in about 3 months (around 9/17/2019).    Thank you for allowing me to participate in the care of this patient.    Dr. Earle Gonzalez  06/17/19  This note was scribed by Divya Lowery RN, CDE  CC:   Marleny VARGAS M.D.    This note was created using voice recognition software (Dragon). The accuracy of the dictation is limited by the abilities of the software. I have reviewed the note prior to signing, however some errors in grammar and context are still possible. If you have any questions related to this note please do not hesitate to contact our office.

## 2019-06-17 ENCOUNTER — OFFICE VISIT (OUTPATIENT)
Dept: ENDOCRINOLOGY | Facility: MEDICAL CENTER | Age: 59
End: 2019-06-17
Payer: COMMERCIAL

## 2019-06-17 VITALS
HEIGHT: 67 IN | SYSTOLIC BLOOD PRESSURE: 110 MMHG | DIASTOLIC BLOOD PRESSURE: 60 MMHG | BODY MASS INDEX: 28.56 KG/M2 | OXYGEN SATURATION: 98 % | WEIGHT: 182 LBS | HEART RATE: 90 BPM

## 2019-06-17 DIAGNOSIS — E11.65 UNCONTROLLED TYPE 2 DIABETES MELLITUS WITH HYPERGLYCEMIA (HCC): ICD-10-CM

## 2019-06-17 DIAGNOSIS — E55.9 VITAMIN D DEFICIENCY: ICD-10-CM

## 2019-06-17 DIAGNOSIS — E03.9 ACQUIRED HYPOTHYROIDISM: ICD-10-CM

## 2019-06-17 LAB
HBA1C MFR BLD: 10.5 % (ref 0–5.6)
INT CON NEG: ABNORMAL
INT CON POS: ABNORMAL

## 2019-06-17 PROCEDURE — 83036 HEMOGLOBIN GLYCOSYLATED A1C: CPT | Performed by: INTERNAL MEDICINE

## 2019-06-17 PROCEDURE — 99214 OFFICE O/P EST MOD 30 MIN: CPT | Performed by: INTERNAL MEDICINE

## 2019-07-15 ENCOUNTER — TELEPHONE (OUTPATIENT)
Dept: ENDOCRINOLOGY | Facility: MEDICAL CENTER | Age: 59
End: 2019-07-15

## 2019-07-15 DIAGNOSIS — E11.65 UNCONTROLLED TYPE 2 DIABETES MELLITUS WITH HYPERGLYCEMIA (HCC): ICD-10-CM

## 2019-07-15 NOTE — TELEPHONE ENCOUNTER
Was the patient seen in the last year in this department? Yes    Does patient have an active prescription for medications requested? No     Received Request Via: Patient     Patient is requesting Ozempic (she is currently taking 0.5mg dose once a week) and Tresiba (68units every morning).    **Last office visit 6/17/19**

## 2019-07-16 ENCOUNTER — TELEPHONE (OUTPATIENT)
Dept: ENDOCRINOLOGY | Facility: MEDICAL CENTER | Age: 59
End: 2019-07-16

## 2019-07-16 NOTE — TELEPHONE ENCOUNTER
1. Caller Name: Nancy                                         Call Back Number: [phone] (home)         Patient approves a detailed voicemail message: no    Patient said she received Tresiba 200mg/ml at the office as a sample but Tresiba 100mg was sent to her pharmacy. Which one did you want her on? She is taking 68 unitsof Tresiba nightly.

## 2019-07-25 DIAGNOSIS — E03.9 HYPOTHYROIDISM, UNSPECIFIED TYPE: ICD-10-CM

## 2019-07-25 RX ORDER — LEVOTHYROXINE SODIUM 0.12 MG/1
TABLET ORAL
Qty: 90 TAB | Refills: 3 | Status: SHIPPED | OUTPATIENT
Start: 2019-07-25 | End: 2020-08-24 | Stop reason: SDUPTHER

## 2019-07-25 NOTE — TELEPHONE ENCOUNTER
Was the patient seen in the last year in this department? Yes    Does patient have an active prescription for medications requested? No     Received Request Via: Pharmacy     Last OV 6/19/19  Labs 9/5/17      DX code did not pass through pharmacy.  It looks like DX code is for daibetes.  Can DX code be changed?  Please advise

## 2019-08-05 RX ORDER — VENLAFAXINE 75 MG/1
TABLET ORAL
Qty: 90 TAB | Refills: 3 | Status: SHIPPED
Start: 2019-08-05 | End: 2020-02-24 | Stop reason: SINTOL

## 2019-08-05 NOTE — TELEPHONE ENCOUNTER
Was the patient seen in the last year in this department? Yes    Does patient have an active prescription for medications requested? No     Received Request Via: Pharmacy     Last OV 3/20/19, last labs 9/3/18

## 2019-08-06 ENCOUNTER — OFFICE VISIT (OUTPATIENT)
Dept: ENDOCRINOLOGY | Facility: MEDICAL CENTER | Age: 59
End: 2019-08-06
Payer: COMMERCIAL

## 2019-08-06 VITALS
BODY MASS INDEX: 28.56 KG/M2 | WEIGHT: 182 LBS | SYSTOLIC BLOOD PRESSURE: 116 MMHG | DIASTOLIC BLOOD PRESSURE: 74 MMHG | HEIGHT: 67 IN | OXYGEN SATURATION: 94 % | HEART RATE: 108 BPM

## 2019-08-06 DIAGNOSIS — E55.9 VITAMIN D DEFICIENCY: ICD-10-CM

## 2019-08-06 DIAGNOSIS — E11.65 UNCONTROLLED TYPE 2 DIABETES MELLITUS WITH HYPERGLYCEMIA (HCC): ICD-10-CM

## 2019-08-06 DIAGNOSIS — E53.8 VITAMIN B 12 DEFICIENCY: ICD-10-CM

## 2019-08-06 DIAGNOSIS — E03.9 ACQUIRED HYPOTHYROIDISM: ICD-10-CM

## 2019-08-06 PROCEDURE — 92250 FUNDUS PHOTOGRAPHY W/I&R: CPT | Mod: TC | Performed by: INTERNAL MEDICINE

## 2019-08-06 PROCEDURE — 99214 OFFICE O/P EST MOD 30 MIN: CPT | Performed by: INTERNAL MEDICINE

## 2019-08-06 RX ORDER — LANCETS 30 GAUGE
EACH MISCELLANEOUS
Qty: 100 EACH | Refills: 11 | Status: SHIPPED | OUTPATIENT
Start: 2019-08-06 | End: 2020-03-23 | Stop reason: SDUPTHER

## 2019-08-06 NOTE — PROGRESS NOTES
Endocrinology Clinic Progress Note  PCP: Marleny VARGAS M.D.    HPI:  Gem Barnett is a 59 y.o. old patient who comes in today for routine follow up of Management of Uncontrolled Type 2 Diabetes, Hypothyroidism, and Vitamin D Deficiency.    Hypothyroidism:  Currently taking Levothyroxine 125 mcg daily.  Feeling more anxious lately.     Vitamin D Deficiency:  Currently taking Vitamin D 10,000 units daily.     HPI:  Gem Barnett is a 59 y.o. old patient who comes in today for evaluation of above stated problem.    Most Recent HbA1c:   Lab Results   Component Value Date/Time    HBA1C 10.5 (A) 06/17/2019 04:42 PM        Current Diabetes Regimen:  GLP-1 Agent: Ozempic .5 mg weekly    SGLT-2 Inhibitor:  Has tried them all and they make her itch.    Basal Insulin: Tresiba 68 units     Other: Metformin ER 2 500 mg daily.  She states she does not tolerate any higher of a dose.    Testing blood sugars 3 times daily.  Blood sugars running .    Hypoglycemia:  She had a 53 in the morning at 9 am    ROS:  Constitutional: No weight loss  Cardiac: No palpitations or racing heart  GI: nausea which she attributes to venlafaxine  Resp: No shortness of breath  Neuro: No numbness or tinging in feet  Endo: No heat or cold intolerance, no polyuria or polydipsia  All other systems were reviewed and were negative.    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 No pertinent surgical history.    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     Employer: [employer]     Comment: Special education   Social Needs   • Financial resource strain: Not on file   • Food insecurity:     Worry: Not on file     Inability: Not on file   • Transportation needs:     Medical: Not on file     Non-medical: Not on file   Tobacco Use   • Smoking status: Never Smoker   • Smokeless tobacco: Never Used   Substance and Sexual Activity   • Alcohol use: Yes     Alcohol/week: 0.6 oz     Types: 1 Glasses of wine per week     Comment: glass of wine a few times a year   • Drug use: No   • Sexual activity: Not Currently   Lifestyle   • Physical activity:     Days per week: Not on file     Minutes per session: Not on file   • Stress: Not on file   Relationships   • Social connections:     Talks on phone: Not on file     Gets together: Not on file     Attends [employer] service: Not on file     Active member of club or organization: Not on file     Attends meetings of clubs or organizations: Not on file     Relationship status: Not on file   • Intimate partner violence:     Fear of current or ex partner: Not on file     Emotionally abused: Not on file     Physically abused: Not on file     Forced sexual activity: Not on file   Other Topics Concern   • Not on file   Social History Narrative   • Not on file       Family History:  Family History   Problem Relation Age of Onset   • Diabetes Mother    • Diabetes Brother         type 1,    • Hypertension Brother        Medications:    Current Outpatient Medications:   •  " Insulin Degludec (TRESIBA FLEXTOUCH) 200 UNIT/ML Solution Pen-injector, Inject 75 Units as instructed every day., Disp: 75 mL, Rfl: 3  •  venlafaxine (EFFEXOR) 75 MG Tab, TAKE 1 TABLET BY MOUTH ONCE DAILY, Disp: 90 Tab, Rfl: 3  •  levothyroxine (SYNTHROID) 125 MCG Tab, TAKE 1 TABLET BY MOUTH IN THE MORNING ON AN EMPTY STOMACH, Disp: 90 Tab, Rfl: 3  •  Semaglutide (OZEMPIC) 0.25 or 0.5 MG/DOSE Solution Pen-injector, Inject 0.5 mg as instructed every 7 days., Disp: 1 PEN, Rfl: 11  •  Insulin Degludec (TRESIBA FLEXTOUCH) 100 UNIT/ML Solution Pen-injector, Inject 68 Units as instructed every day., Disp: 7 PEN, Rfl: 11  •  Cyanocobalamin (VITAMIN B 12 PO), Take 1,000 mcg by mouth every day., Disp: , Rfl:   •  Insulin Pen Needle 32 G x 4 mm, 1 Each by Does not apply route 3 times a day., Disp: 300 Each, Rfl: 3  •  ipratropium-albuterol (DUONEB) 0.5-2.5 (3) MG/3ML nebulizer solution, 3 mL by Nebulization route 4 times a day., Disp: 120 Bullet, Rfl: 1  •  albuterol (PROAIR HFA) 108 (90 Base) MCG/ACT Aero Soln inhalation aerosol, Inhale 2 Puffs by mouth every 6 hours as needed for Shortness of Breath., Disp: 8.5 g, Rfl: 3  •  metFORMIN ER (GLUCOPHAGE XR) 500 MG TABLET SR 24 HR, TAKE TWO TABLETS BY MOUTH TWICE DAILY, Disp: 1360 Tab, Rfl: 1  •  Cholecalciferol (VITAMIN D3) 5000 units Tab, Take 10,000 Units by mouth., Disp: , Rfl:   •  Insulin Pen Needle 32G X 4 MM Misc, 1 Each by Does not apply route 2 times daily, before breakfast and dinner., Disp: 200 Each, Rfl: 1  •  Blood Glucose Monitoring Suppl SUPPLIES MISC, Relion Test strips. Test 4 times daily. ICD 9 250.00 Type 2 diabetes insulin requiring, Disp: 400 Strip, Rfl: 3  •  hydrocodone-acetaminophen (VICODIN) 5-500 MG TABS, Take 1-2 Tabs by mouth every four hours as needed., Disp: , Rfl:   •  Multiple Vitamin (MULTIVITAMINS PO), Take  by mouth., Disp: , Rfl:     Labs: Reviewed    Physical Examination:  Vital signs: /74   Pulse (!) 108   Ht 1.702 m (5' 7\")   Wt " 82.6 kg (182 lb)   LMP 06/30/2011   SpO2 94%   BMI 28.51 kg/m²  Body mass index is 28.51 kg/m².  General: No apparent distress, cooperative  Eyes: No scleral icterus or discharge  ENMT: Normal on external inspection of nose, lips, normal thyroid exam  Neck: No abnormal masses on inspection  Resp: Normal effort, clear to auscultation bilaterally   CVS: Regular rate and rhythm, S1 S2 normal, no murmur   Extremities: No edema  Abdomen: abdominal obesity present  Neuro: Alert and oriented  Skin: No rash  Psych: Normal mood and affect, intact memory and able to make informed decisions    Foot Exam:  Monofilament: done  Monofilament testing with a 10 gram force: sensation intact: intact bilaterally  Visual Inspection: Feet without maceration, ulcers, fissures.  Pedal pulses: intact bilaterally    Assessment and Plan:    1. Uncontrolled type 2 diabetes mellitus with hyperglycemia (HCC)  Continue current regimen.  A1c today was 9.2%.  Already down from 10.5% in a short amount of time.  The following was reviewed  - Discussed diabetic diet discussed in detail-plate method.  - She will test before meals and log .  - She will walk for 20-30 minutes daily.  - Reviewed medications and advised how to take   - Discussed importance of immunizations and yearly eye exams. Will have today in the office.  - Advised daily foot exams. Educated on signs of infection.   - Educated on need to stay well hydrated with water.  - Educated to call with any questions or problems.    2. Acquired hypothyroidism  Continue current dose of levothyroxine.  Will check the levels and adjust the dose accordingly    3. Vitamin D deficiency  Continue vitamin D supplementation.    Return in about 3 months (around 11/6/2019).    Thank you for allowing me to participate in the care of this patient.    Dr. Earle Gonzalez  This note was scribed by Divya Lowery RN, CDE  08/06/19    CC:   Marleny VARGAS M.D.    This note was created using voice recognition  software (Dragon). The accuracy of the dictation is limited by the abilities of the software. I have reviewed the note prior to signing, however some errors in grammar and context are still possible. If you have any questions related to this note please do not hesitate to contact our office.

## 2019-08-15 LAB — RETINAL SCREEN: NEGATIVE

## 2019-11-13 NOTE — PROGRESS NOTES
Endocrinology Clinic Progress Note  PCP: Marleny VARGAS M.D.    HPI:  Gem Barnett is a 59 y.o. old patient who comes in today for review of her endocrine problems.  She had labs ordered for this visit and forgot to have them done.   1. Hypothyroid:  Currently on Levothyroxine 125 mcg per day.     2. Vitamin D deficiency:  Currently on Vitamin D supplementation of 5000 iu per day    3. Vitamin B 12 deficiency:  Currently on Vitamin B 12 supplementation of 1000 mcg per day    4. Type 2 diabetes, uncontrolled:   Most Recent HbA1c:   Lab Results   Component Value Date/Time    HBA1C 10.1 (A) 11/18/2019 10:39 AM   Previous A1c was 10.5 on 6/17/19     Current Diabetes Regimen:  Ozempic 0.5 mg per week  Tresiba 68 units per day (most days, states she completely forgot to take her insulin last weekend while she was watching her grandson)  Metformin XR 1000 mg bid.     Patient has been testing blood sugars 1 times per day.   States when she was taking her medication as directed her fasting blood sugars were in the 120 range and then later in the day it was in the 160-170 range.   Since forgetting to take her insulin last weekend she states her blood sugars have been running in the 200 range and she is having a difficulty time getting them back down.    Hypoglycemia:  None    Exercise: walking, estimates it could be up to a couple miles per day.   Diet: diet has not been as healthy as she would like it, states she ate a lot of Halloween Candy.    Last Retinal Exam: current and on the chart   Daily Foot Exam: yes         ROS:  Constitutional: No weight loss  Cardiac: No palpitations or racing heart  Resp: No shortness of breath  Neuro: No numbness or tinging in feet  Endo: No heat or cold intolerance, no polyuria or polydipsia  All other systems were reviewed and were negative.    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 No pertinent surgical history.    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     Employer: [employer]     Comment: Special education   Social Needs   • Financial resource strain: Not on file   • Food insecurity:     Worry: Not on file     Inability: Not on file   • Transportation needs:     Medical: Not on file     Non-medical: Not on file   Tobacco Use   • Smoking status: Never Smoker   • Smokeless tobacco: Never Used   Substance and Sexual Activity   • Alcohol use: Yes     Alcohol/week: 0.6 oz     Types: 1 Glasses of wine per week     Comment: glass of wine a few times a year   • Drug use: No   • Sexual activity: Not Currently   Lifestyle   • Physical activity:     Days per week: Not on file     Minutes per session: Not on file   • Stress: Not on file   Relationships   • Social connections:     Talks on phone: Not on file     Gets together: Not on file     Attends [employer] service: Not on file     Active member of club or organization: Not on file     Attends meetings of clubs or organizations: Not on file     Relationship status: Not on file   • Intimate partner  violence:     Fear of current or ex partner: Not on file     Emotionally abused: Not on file     Physically abused: Not on file     Forced sexual activity: Not on file   Other Topics Concern   • Not on file   Social History Narrative   • Not on file       Family History:  Family History   Problem Relation Age of Onset   • Diabetes Mother    • Diabetes Brother         type 1,    • Hypertension Brother        Medications:    Current Outpatient Medications:   •  venlafaxine (EFFEXOR) 75 MG Tab, TAKE 1 TABLET BY MOUTH ONCE DAILY, Disp: 90 Tab, Rfl: 3  •  levothyroxine (SYNTHROID) 125 MCG Tab, TAKE 1 TABLET BY MOUTH IN THE MORNING ON AN EMPTY STOMACH, Disp: 90 Tab, Rfl: 3  •  Semaglutide (OZEMPIC) 0.25 or 0.5 MG/DOSE Solution Pen-injector, Inject 0.5 mg as instructed every 7 days., Disp: 1 PEN, Rfl: 11  •  Insulin Degludec (TRESIBA FLEXTOUCH) 100 UNIT/ML Solution Pen-injector, Inject 68 Units as instructed every day., Disp: 7 PEN, Rfl: 11  •  Cyanocobalamin (VITAMIN B 12 PO), Take 1,000 mcg by mouth every day., Disp: , Rfl:   •  ipratropium-albuterol (DUONEB) 0.5-2.5 (3) MG/3ML nebulizer solution, 3 mL by Nebulization route 4 times a day., Disp: 120 Bullet, Rfl: 1  •  albuterol (PROAIR HFA) 108 (90 Base) MCG/ACT Aero Soln inhalation aerosol, Inhale 2 Puffs by mouth every 6 hours as needed for Shortness of Breath., Disp: 8.5 g, Rfl: 3  •  metFORMIN ER (GLUCOPHAGE XR) 500 MG TABLET SR 24 HR, TAKE TWO TABLETS BY MOUTH TWICE DAILY, Disp: 1360 Tab, Rfl: 1  •  Cholecalciferol (VITAMIN D3) 5000 units Tab, Take 10,000 Units by mouth., Disp: , Rfl:   •  hydrocodone-acetaminophen (VICODIN) 5-500 MG TABS, Take 1-2 Tabs by mouth every four hours as needed., Disp: , Rfl:   •  Multiple Vitamin (MULTIVITAMINS PO), Take  by mouth., Disp: , Rfl:   •  Blood Glucose Monitoring Suppl Device, Meter: AccuChek Josefina plus meter  Sig. Use as directed for blood sugar monitoring. #1. NR., Disp: 1 Device, Rfl: 0  •  Blood Glucose Test  "Strips, Test strips order: Test strips for Accucheck Josefina Plus meter. Sig: use 3 daily for insulin adjustment, Disp: 300 Strip, Rfl: 3  •  Lancets, Fastclix lancets for 3 times daily testing, Disp: 100 Each, Rfl: 11  •  Insulin Pen Needle 32 G x 4 mm, 1 Each by Does not apply route 3 times a day., Disp: 300 Each, Rfl: 3  •  Insulin Pen Needle 32G X 4 MM Misc, 1 Each by Does not apply route 2 times daily, before breakfast and dinner., Disp: 200 Each, Rfl: 1  •  Blood Glucose Monitoring Suppl SUPPLIES MISC, Relion Test strips. Test 4 times daily. ICD 9 250.00 Type 2 diabetes insulin requiring, Disp: 400 Strip, Rfl: 3    Labs: Reviewed    Physical Examination:  Vital signs: /62 (BP Location: Left arm, Patient Position: Sitting, BP Cuff Size: Adult)   Pulse 96   Ht 1.702 m (5' 7\")   Wt 81.6 kg (179 lb 12.8 oz)   LMP 06/30/2011   SpO2 96%   BMI 28.16 kg/m²  Body mass index is 28.16 kg/m².  General: No apparent distress, cooperative  Eyes: No scleral icterus or discharge  ENMT: Normal on external inspection of nose, lips, normal thyroid exam  Neck: No abnormal masses on inspection  Resp: Normal effort, clear to auscultation bilaterally   CVS: Regular rate and rhythm, S1 S2 normal, no murmur   Extremities: No edema  Abdomen: abdominal obesity present  Neuro: Alert and oriented  Skin: No rash  Psych: Normal mood and affect, intact memory and able to make informed decisions    Assessment and Plan:  1. Hypothyroidism, unspecified type  Continue current dose of Levothyroxine.     2. Vitamin D deficiency  Cont vit D    3. Vitamin B12 deficiency  Cont B 12.     4. Uncontrolled type 2 diabetes mellitus with hyperglycemia (HCC)  Emphasized on the compliance of the regimen and not to miss the dosages; increase ozempic to 1 mg weekly  - Discussed diabetic diet, encouraged portion control.   - Discussed importance of testing blood sugars and keeping logs.   - Discussed importance of daily exercise, recommended 30 minutes " per day  - Reviewed medications and advised how to take.  - Discussed importance of immunizations and yearly eye exams.   - Advised daily foot  exams. Educated on signs of infection.   - Educated on need to stay well hydrated with water.  - Educated to call with any questions or problems.      Return in about 3 months (around 2/18/2020).    Thank you for allowing me to participate in the care of this patient.    Earle Gonzalez M.D.  11/13/19    CC:   Marleny VARGAS M.D.    This note was created using voice recognition software (Dragon). The accuracy of the dictation is limited by the abilities of the software. I have reviewed the note prior to signing, however some errors in grammar and context are still possible. If you have any questions related to this note please do not hesitate to contact our office.   This note was scribed by Ruth Mitchell RN, CDE

## 2019-11-18 ENCOUNTER — OFFICE VISIT (OUTPATIENT)
Dept: ENDOCRINOLOGY | Facility: MEDICAL CENTER | Age: 59
End: 2019-11-18
Payer: COMMERCIAL

## 2019-11-18 VITALS
BODY MASS INDEX: 28.22 KG/M2 | DIASTOLIC BLOOD PRESSURE: 62 MMHG | HEIGHT: 67 IN | HEART RATE: 96 BPM | OXYGEN SATURATION: 96 % | WEIGHT: 179.8 LBS | SYSTOLIC BLOOD PRESSURE: 108 MMHG

## 2019-11-18 DIAGNOSIS — E53.8 VITAMIN B12 DEFICIENCY: ICD-10-CM

## 2019-11-18 DIAGNOSIS — E11.65 UNCONTROLLED TYPE 2 DIABETES MELLITUS WITH HYPERGLYCEMIA (HCC): ICD-10-CM

## 2019-11-18 DIAGNOSIS — E03.9 HYPOTHYROIDISM, UNSPECIFIED TYPE: ICD-10-CM

## 2019-11-18 DIAGNOSIS — E55.9 VITAMIN D DEFICIENCY: ICD-10-CM

## 2019-11-18 LAB
HBA1C MFR BLD: 10.1 % (ref 0–5.6)
INT CON NEG: ABNORMAL
INT CON POS: ABNORMAL

## 2019-11-18 PROCEDURE — 99214 OFFICE O/P EST MOD 30 MIN: CPT | Performed by: INTERNAL MEDICINE

## 2019-11-18 PROCEDURE — 83036 HEMOGLOBIN GLYCOSYLATED A1C: CPT | Performed by: INTERNAL MEDICINE

## 2020-01-06 ENCOUNTER — TELEPHONE (OUTPATIENT)
Dept: ENDOCRINOLOGY | Facility: MEDICAL CENTER | Age: 60
End: 2020-01-06

## 2020-01-06 NOTE — TELEPHONE ENCOUNTER
Was the patient seen in the last year in this department? Yes    Does patient have an active prescription for medications requested? No     Received Request Via: Pharmacy     ozempic 1mg

## 2020-01-06 NOTE — TELEPHONE ENCOUNTER
Patient also mentioned that she has been very nauseous after taking venlafaxine (EFFEXOR)    Wondering if she can stop taking it?

## 2020-01-07 NOTE — TELEPHONE ENCOUNTER
Phone Number Called: [phone] (home)       Call outcome: left message for patient to call back regarding message below    Message: Lm for patient that we can not tell her to stop taking it

## 2020-01-15 NOTE — TELEPHONE ENCOUNTER
1. Caller Name: Gem                                         Call Back Number: [phone] (home)         Patient approves a detailed voicemail message: no    Patient called saying that the wrong Ozempic was called in 0.25. Please send in the 1mg dose.

## 2020-01-28 ENCOUNTER — PATIENT MESSAGE (OUTPATIENT)
Dept: MEDICAL GROUP | Facility: PHYSICIAN GROUP | Age: 60
End: 2020-01-28

## 2020-02-24 ENCOUNTER — OFFICE VISIT (OUTPATIENT)
Dept: ENDOCRINOLOGY | Facility: MEDICAL CENTER | Age: 60
End: 2020-02-24
Payer: COMMERCIAL

## 2020-02-24 VITALS
BODY MASS INDEX: 27.31 KG/M2 | OXYGEN SATURATION: 98 % | SYSTOLIC BLOOD PRESSURE: 96 MMHG | HEIGHT: 67 IN | DIASTOLIC BLOOD PRESSURE: 60 MMHG | WEIGHT: 174 LBS

## 2020-02-24 DIAGNOSIS — E03.9 ACQUIRED HYPOTHYROIDISM: ICD-10-CM

## 2020-02-24 DIAGNOSIS — E53.8 VITAMIN B12 DEFICIENCY: ICD-10-CM

## 2020-02-24 DIAGNOSIS — E11.65 UNCONTROLLED TYPE 2 DIABETES MELLITUS WITH HYPERGLYCEMIA (HCC): ICD-10-CM

## 2020-02-24 DIAGNOSIS — E55.9 VITAMIN D DEFICIENCY: ICD-10-CM

## 2020-02-24 LAB
HBA1C MFR BLD: 10.2 % (ref 0–5.6)
INT CON NEG: ABNORMAL
INT CON POS: ABNORMAL

## 2020-02-24 PROCEDURE — 83036 HEMOGLOBIN GLYCOSYLATED A1C: CPT | Performed by: INTERNAL MEDICINE

## 2020-02-24 PROCEDURE — 99214 OFFICE O/P EST MOD 30 MIN: CPT | Performed by: INTERNAL MEDICINE

## 2020-02-24 NOTE — PROGRESS NOTES
Endocrinology Clinic Progress Note  PCP: Marleny VARGAS M.D.    HPI:  Gem Barnett is a 59 y.o. old patient who comes in today for routine follow up of Management of Uncontrolled Type 2 Diabetes, Hypothyroidism, and Vitamin D Deficiency.  Having lots of stress with family and boyfriend leaving.  She did not tolerate the Effexor due to nausea.     Hypothyroidism:  Currently taking Levothyroxine 125 mcg daily.  Lab results from 2/18/2020 shows a TSH of 1.94, FT4 of 1.28, FT3 of 2.20, and T3 of .79.     Vitamin D Deficiency:  Currently taking Vitamin D 10,000 units daily.  She has forgotten it lately.  Vitamin D level on 2/18/20 was 33.3.    HPI:  Gem Barnett is a 59 y.o. old patient who comes in today for evaluation of above stated problem.    Most Recent HbA1c:   Lab Results   Component Value Date/Time    HBA1C 10.2 (A) 02/24/2020 01:37 PM        Current Diabetes Regimen:    GLP-1 Agent: Ozempic 1 mg weekly. Reviewed her injection technique which is correct but she has been injecting in the same spots most of th times.      SGLT-2 Inhibitor:  Has tried them all and they make her itch.     Basal Insulin: Tresiba 68 units daily.Reviewed her injection technique which is correct but she has been injecting in the same spots most of the times.      Other: Metformin ER 2 500 mg daily.       Testing blood sugars 1 to 3 times daily  Before Breakfast: 120's    Before Dinner: 180-300    Hypoglycemia:  None    ROS:  Constitutional: No weight loss  Cardiac: No palpitations or racing heart  Resp: No shortness of breath  Neuro: No numbness or tinging in feet  Endo: No heat or cold intolerance, no polyuria or polydipsia  All other systems were reviewed and were negative.    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 No pertinent surgical history.    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     Employer: [employer]     Comment: Special education   Social Needs   • Financial resource strain: Not on file   • Food insecurity     Worry: Not on file     Inability: Not on file   • Transportation needs     Medical: Not on file     Non-medical: Not on file   Tobacco Use   • Smoking status: Never Smoker   • Smokeless tobacco: Never Used   Substance and Sexual Activity   • Alcohol use: Yes     Alcohol/week: 0.6 oz     Types: 1 Glasses of wine per week     Comment: glass of wine a few times a year   • Drug use: No   • Sexual activity: Not Currently   Lifestyle   • Physical activity     Days per week: Not on file     Minutes per session: Not on file   • Stress: Not on file   Relationships   • Social connections     Talks on phone: Not on file     Gets together: Not on file     Attends L[employer]-day service: Not on file     Active member of club or organization: Not on file     Attends meetings of clubs or organizations: Not on file     Relationship status: Not on file   • Intimate partner violence     Fear of current or ex partner: Not on file     Emotionally abused: Not on file     Physically abused: Not on  file     Forced sexual activity: Not on file   Other Topics Concern   • Not on file   Social History Narrative   • Not on file       Family History:  Family History   Problem Relation Age of Onset   • Diabetes Mother    • Diabetes Brother         type 1,    • Hypertension Brother        Medications:    Current Outpatient Medications:   •  Semaglutide, 1 MG/DOSE, (OZEMPIC, 1 MG/DOSE,) 2 MG/1.5ML Solution Pen-injector, Inject 1 mg as instructed every 7 days., Disp: 1 PEN, Rfl: 6  •  metFORMIN ER (GLUCOPHAGE XR) 500 MG TABLET SR 24 HR, TAKE 2 TABLETS BY MOUTH TWICE DAILY, Disp: 360 Tab, Rfl: 3  •  Blood Glucose Monitoring Suppl Device, Meter: AccuChek Josefina plus meter  Sig. Use as directed for blood sugar monitoring. #1. NR., Disp: 1 Device, Rfl: 0  •  Blood Glucose Test Strips, Test strips order: Test strips for Accucheck Josefina Plus meter. Sig: use 3 daily for insulin adjustment, Disp: 300 Strip, Rfl: 3  •  Lancets, Fastclix lancets for 3 times daily testing, Disp: 100 Each, Rfl: 11  •  levothyroxine (SYNTHROID) 125 MCG Tab, TAKE 1 TABLET BY MOUTH IN THE MORNING ON AN EMPTY STOMACH, Disp: 90 Tab, Rfl: 3  •  Insulin Degludec (TRESIBA FLEXTOUCH) 100 UNIT/ML Solution Pen-injector, Inject 68 Units as instructed every day., Disp: 7 PEN, Rfl: 11  •  Cyanocobalamin (VITAMIN B 12 PO), Take 1,000 mcg by mouth every day., Disp: , Rfl:   •  Insulin Pen Needle 32 G x 4 mm, 1 Each by Does not apply route 3 times a day., Disp: 300 Each, Rfl: 3  •  ipratropium-albuterol (DUONEB) 0.5-2.5 (3) MG/3ML nebulizer solution, 3 mL by Nebulization route 4 times a day., Disp: 120 Bullet, Rfl: 1  •  albuterol (PROAIR HFA) 108 (90 Base) MCG/ACT Aero Soln inhalation aerosol, Inhale 2 Puffs by mouth every 6 hours as needed for Shortness of Breath., Disp: 8.5 g, Rfl: 3  •  Cholecalciferol (VITAMIN D3) 5000 units Tab, Take 10,000 Units by mouth., Disp: , Rfl:   •  Blood Glucose Monitoring Suppl SUPPLIES MISC, Relion Test strips. Test 4 times  "daily. ICD 9 250.00 Type 2 diabetes insulin requiring, Disp: 400 Strip, Rfl: 3  •  hydrocodone-acetaminophen (VICODIN) 5-500 MG TABS, Take 1-2 Tabs by mouth every four hours as needed., Disp: , Rfl:   •  Multiple Vitamin (MULTIVITAMINS PO), Take  by mouth., Disp: , Rfl:     Labs: Reviewed    Physical Examination:  Vital signs: BP (!) 96/60   Ht 1.702 m (5' 7\")   Wt 78.9 kg (174 lb)   LMP 06/30/2011   SpO2 98%   BMI 27.25 kg/m²  Body mass index is 27.25 kg/m². Patient's body mass index is 27.25 kg/m². Exercise and nutrition counseling were performed at this visit.  General: No apparent distress, cooperative  Eyes: No scleral icterus or discharge  ENMT: Normal on external inspection of nose, lips, normal thyroid exam  Neck: No abnormal masses on inspection  Resp: Normal effort, clear to auscultation bilaterally   CVS: Regular rate and rhythm, S1 S2 normal, no murmur   Extremities: No edema  Abdomen: abdominal obesity present  Neuro: Alert and oriented  Skin: No rash  Psych: Normal mood and affect, intact memory and able to make informed decisions      Foot Exam:  Monofilament: done  Monofilament testing with a 10 gram force: sensation intact: intact bilaterally  Visual Inspection: Feet without maceration, ulcers, fissures.  Pedal pulses: intact bilaterally    Assessment and Plan:    1. Uncontrolled type 2 diabetes mellitus with hyperglycemia (HCC)  Increase tresiba to 80 units daily; advised on site rotation for her tresiba injection and also ozempic; currently not doing it as per HPI; this will facilitate better absorption .continue other meds as per HPI    Will discuss adding statin in next visit.    The following was reviewed  - Discussed diabetic diet discussed in detail-plate method.  - She will test before meals and log.  - She will walk for 20-30 minutes daily.  - Discussed importance of immunizations and yearly eye exams. Had an eye exam in the office on 8/6/19.  She saw Dr. Isaac last month.  - " Advised daily foot exams. Educated on signs of infection.   - Educated on need to stay well hydrated with water.  - Educated to call with any questions or problems.    2. Acquired hypothyroidism  Continue levothyroxine.     3. Vitamin D deficiency  Advised compliance with vit d .    4. Dyslipidemia:  Discuss statin use in next visit.   Vast majority of time was spent on site rotation for injection of ozempic and tresiba.     Return in about 3 months (around 5/24/2020).    Thank you for allowing me to participate in the care of this patient.    Dr. Earle Gonzalez      CC:   Marleny VARGAS M.D.    This note was created using voice recognition software (Dragon). The accuracy of the dictation is limited by the abilities of the software. I have reviewed the note prior to signing, however some errors in grammar and context are still possible. If you have any questions related to this note please do not hesitate to contact our office.

## 2020-03-23 DIAGNOSIS — E11.65 UNCONTROLLED TYPE 2 DIABETES MELLITUS WITH HYPERGLYCEMIA (HCC): ICD-10-CM

## 2020-03-23 RX ORDER — LANCETS 30 GAUGE
EACH MISCELLANEOUS
Qty: 100 EACH | Refills: 11 | Status: SHIPPED
Start: 2020-03-23 | End: 2020-04-02 | Stop reason: CLARIF

## 2020-03-23 RX ORDER — INSULIN DEGLUDEC 100 U/ML
80 INJECTION, SOLUTION SUBCUTANEOUS DAILY
Qty: 8 PEN | Refills: 11 | Status: SHIPPED | OUTPATIENT
Start: 2020-03-23 | End: 2020-06-09

## 2020-03-31 ENCOUNTER — TELEPHONE (OUTPATIENT)
Dept: ENDOCRINOLOGY | Facility: MEDICAL CENTER | Age: 60
End: 2020-03-31

## 2020-03-31 NOTE — TELEPHONE ENCOUNTER
Received a patient call requesting  Correct refill for lancet and test strips for One Touch  be sent to [facility] pharmacy  In [place].  Please call patient when ready     okay to leave detailed message.

## 2020-04-02 RX ORDER — LANCETS 30 GAUGE
EACH MISCELLANEOUS
Qty: 300 EACH | Refills: 3 | Status: SHIPPED | OUTPATIENT
Start: 2020-04-02

## 2020-04-02 NOTE — TELEPHONE ENCOUNTER
Phone Number Called: [phone]    Call outcome: Left detailed message for patient. Informed to call back with any additional questions.    Message: Contacted patient but there was no answer. I asked for her to call us back with which one touch meter she is using. I asked the patient to call us back.

## 2020-04-06 DIAGNOSIS — J98.8 RTI (RESPIRATORY TRACT INFECTION): ICD-10-CM

## 2020-04-06 RX ORDER — ALBUTEROL SULFATE 90 UG/1
2 AEROSOL, METERED RESPIRATORY (INHALATION) EVERY 6 HOURS PRN
Qty: 8.5 G | Refills: 3 | Status: SHIPPED | OUTPATIENT
Start: 2020-04-06 | End: 2021-11-24 | Stop reason: SDUPTHER

## 2020-04-06 NOTE — TELEPHONE ENCOUNTER
Received request via: Pharmacy    Was the patient seen in the last year in this department? No     Does the patient have an active prescription (recently filled or refills available) for medication(s) requested? No     Last OV 3/20/19, last labs 2/18/20

## 2020-04-16 ENCOUNTER — TELEPHONE (OUTPATIENT)
Dept: MEDICAL GROUP | Facility: PHYSICIAN GROUP | Age: 60
End: 2020-04-16

## 2020-04-16 NOTE — TELEPHONE ENCOUNTER
----- Message from Gem Barnett sent at 4/14/2020  2:31 PM PDT -----  Regarding: Prescription Question  Contact: [phone]  Okay, it's been 3 months off venlafaxine medication.  Update: It's been a horrible 3 months.  Excessive crying and depression.  I want to get a new medication or.. if necessary try again on this one. I am just scared to try again, the throwing up was really hard on me.  What do I need to do? A friend of mine mentioned viibryd? Do you have video conference appointments? Thank you

## 2020-04-16 NOTE — TELEPHONE ENCOUNTER
Pt. Is scheduled for Monday at 1030 for an ondemand. Do you feel this is appropriate or would you like her to come in person.

## 2020-04-20 ENCOUNTER — TELEMEDICINE (OUTPATIENT)
Dept: MEDICAL GROUP | Facility: PHYSICIAN GROUP | Age: 60
End: 2020-04-20
Payer: COMMERCIAL

## 2020-04-20 DIAGNOSIS — F33.1 MODERATE EPISODE OF RECURRENT MAJOR DEPRESSIVE DISORDER (HCC): ICD-10-CM

## 2020-04-20 DIAGNOSIS — R85.82: ICD-10-CM

## 2020-04-20 DIAGNOSIS — E03.9 HYPOTHYROIDISM, UNSPECIFIED TYPE: ICD-10-CM

## 2020-04-20 DIAGNOSIS — F41.8 ANXIOUS DEPRESSION: ICD-10-CM

## 2020-04-20 DIAGNOSIS — E11.65 UNCONTROLLED TYPE 2 DIABETES MELLITUS WITH HYPERGLYCEMIA (HCC): ICD-10-CM

## 2020-04-20 PROCEDURE — 99214 OFFICE O/P EST MOD 30 MIN: CPT | Mod: 95,CR | Performed by: INTERNAL MEDICINE

## 2020-04-20 RX ORDER — VILAZODONE HYDROCHLORIDE 10 MG/1
10 TABLET ORAL DAILY
Qty: 30 TAB | Refills: 1 | Status: SHIPPED | OUTPATIENT
Start: 2020-04-20 | End: 2020-10-05 | Stop reason: CLARIF

## 2020-04-20 NOTE — ASSESSMENT & PLAN NOTE
Patient reports lots of recent stressors. She noted severe nausea with the changes in her DM medications, addition of the Omzepic, this resolved with discontinuation of the venlafaxine.

## 2020-04-20 NOTE — ASSESSMENT & PLAN NOTE
Patient reports severe depression.  She reports several life events, boyfriend has moved out, other family stressors, Covid and that she self discontinued medications about 3 mos ago.  She is tearful, she denies suicidal ideation.

## 2020-04-20 NOTE — PROGRESS NOTES
No chief complaint on file.      HISTORY OF PRESENT ILLNESS: Patient is a 59 y.o. female established patient who presents by video conference today to discuss the medical issues below.    Recurrent major depressive disorder (HCC)  Patient reports lots of recent stressors. She noted severe nausea with the changes in her DM medications, addition of the Omzepic, this resolved with discontinuation of the venlafaxine.     Anxious depression  Patient reports severe depression.  She reports several life events, boyfriend has moved out, other family stressors, Covid and that she self discontinued medications about 3 mos ago.  She is tearful, she denies suicidal ideation.        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   • Atrophic vaginitis 02/26/2016   • Rash 09/22/2015   • Encounter for long-term (current) use of insulin ([facility]) 04/08/2015   • Hand pain, left 04/10/2014   • Type II diabetes mellitus, uncontrolled ([facility]) 04/02/2014   • Recurrent major depressive disorder ([facility]) 03/20/2014   • Postmenopausal 03/20/2014   • Hypothyroid    • Low back pain        Allergies:Farxiga [dapagliflozin]; Invokana [canagliflozin]; and Jardiance [empagliflozin]    Current Outpatient Medications   Medication Sig Dispense Refill   • Vilazodone HCl (VIIBRYD) 10 MG Tab Take 1 Tab by mouth every day. 30 Tab 1   • albuterol (PROAIR HFA) 108 (90 Base) MCG/ACT Aero Soln inhalation aerosol Inhale 2 Puffs by mouth every 6 hours as needed for Shortness of Breath. 8.5 g 3   • Blood Glucose Test Strips Test strips order: One Touch Verio Sig: use 3x/day and prn ssx high or low sugar 300 Strip 3   • Lancets Lancets order:One Touch Delica Sig: use 3x/day and prn ssx high or  low sugar. 300 Each 3   • Insulin Degludec (TRESIBA FLEXTOUCH) 100 UNIT/ML Solution Pen-injector Inject 80 Units as instructed every day. 8 PEN 11   • Semaglutide, 1 MG/DOSE, (OZEMPIC, 1 MG/DOSE,) 2 MG/1.5ML Solution Pen-injector Inject 1 mg as instructed every 7 days. 1 PEN 6   • metFORMIN ER (GLUCOPHAGE XR) 500 MG TABLET SR 24 HR TAKE 2 TABLETS BY MOUTH TWICE DAILY 360 Tab 3   • levothyroxine (SYNTHROID) 125 MCG Tab TAKE 1 TABLET BY MOUTH IN THE MORNING ON AN EMPTY STOMACH 90 Tab 3   • Cyanocobalamin (VITAMIN B 12 PO) Take 1,000 mcg by mouth every day.     • Insulin Pen Needle 32 G x 4 mm 1 Each by Does not apply route 3 times a day. 300 Each 3   • ipratropium-albuterol (DUONEB) 0.5-2.5 (3) MG/3ML nebulizer solution 3 mL by Nebulization route 4 times a day. 120 Bullet 1   • Cholecalciferol (VITAMIN D3) 5000 units Tab Take 10,000 Units by mouth.     • hydrocodone-acetaminophen (VICODIN) 5-500 MG TABS Take 1-2 Tabs by mouth every four hours as needed.     • Multiple Vitamin (MULTIVITAMINS PO) Take  by mouth.       No current facility-administered medications for this visit.          Past Medical History:   Diagnosis Date   • Anxious depression 2016   • Atrophic vaginitis 2016   • Avulsion of right knee 2016   • Diabetes    • HPV in female 2016   • Hypothyroid    • LBP (low back pain)    • Rash 2015   • Stress incontinence of urine 2017       Social History     Tobacco Use   • Smoking status: Never Smoker   • Smokeless tobacco: Never Used   Substance Use Topics   • Alcohol use: Yes     Alcohol/week: 0.6 oz     Types: 1 Glasses of wine per week     Comment: glass of wine a few times a year   • Drug use: No       Family Status   Relation Name Status   • Mo  Alive   • Bro   at age 46        trauma   • Fa  Alive   • Son  Alive   • Kike  Alive   • Kike  Alive     Family History   Problem Relation Age of Onset   • Diabetes Mother    • Diabetes Brother         type 1,    •  Hypertension Brother        ROS:    Respiratory: Negative for cough, sputum production, shortness of breath or wheezing.    Cardiovascular: Negative for chest pain, palpitations, orthopnea, dyspnea with exertion or edema.   Gastrointestinal: Negative for GI upset, nausea, vomiting, abdominal pain, constipation or diarrhea.   Genitourinary: Negative for dysuria, urgency, hesitancy or frequency.       Exam:    There were no vitals taken for this visit. There is no height or weight on file to calculate BMI.  General:  Well nourished, well developed female in NAD.  HENT: Normocephalic, bilateral TMs are intact, nasal and oral mucosa with no lesions,   Neck: Supple without bruit. Thyroid is not enlarged.  Pulmonary: Clear to ausculation and percussion.  Normal effort. No rales, rhonchi, or wheezing.  Cardiovascular: Regular rate and rhythm without murmur.   Abdomen: Normal bowel sounds soft and nontender no palpable liver spleen bladder mass.  Extremities: No LE edema noted.  Neuro: Grossly nonfocal.  Psych: Alert and oriented to person, place, and time. Appropriate mood and conversation.        This dictation was created using voice recognition software. I have made reasonable attempts to correct errors, however, errors of grammar and content may exist.          Assessment/Plan:    1. Anal low risk HPV DNA test positive  Reviewed need for annual pap    2. Moderate episode of recurrent major depressive disorder (HCC)  Long discussion held.  She is not suicidal, she is substantially depressed.  Start meds as Vilazodone.  It is not clear to me if the nausea was related to the combination of DM medications and venlafaxine.  Start low dose on the vilazodone, recheck in 3 weeks consider increase dose if no significant side effects.  [facility]   - REFERRAL TO BEHAVIORAL HEALTH    3. Anxious depression  As above.  Endocrine has scheduled recheck on the thyroid    4. DM  FS much improved with Endocrine  regimen, defer to Endocrine and adust antidepressant medication around that.       5. Hypothyroid  Endocrine has scheduled lab monitoring. Continues on 125 mcg a day.       Patient was seen for 25 minutes face to face of which more than 50% of the time was spent in counseling and coordination of care regarding the above problems.

## 2020-04-22 ENCOUNTER — TELEPHONE (OUTPATIENT)
Dept: MEDICAL GROUP | Facility: PHYSICIAN GROUP | Age: 60
End: 2020-04-22

## 2020-04-22 NOTE — TELEPHONE ENCOUNTER
DOCUMENTATION OF PAR STATUS:    1. Name of Medication & Dose: VIIBRYD 10mg Tablets    2. Name of Prescription Coverage Company & phone #: [employer]    3. Date Prior Auth Submitted: 4/22/20    4. What information was given to obtain insurance decision? Office notes    5. Prior Auth Status? 4/22 Pending    6. Patient Notified: yes -via Risk Identt -I also asked if her Rx insurance has changed from [address] ID# [id number] grp# [id number]    Your information has been submitted and will be reviewed by Pined[facility]. You may close this dialog, return to your dashboard, and perform other tasks. An electronic determination will be received in [facility] within  hours. You can see the latest determination by locating this request on your dashboard or by reopening this request. You will receive a fax copy of the determination. If [facility]ined[employer] has not responded in 120 hours, contact [employer] at [phone].          Viibryd 10MG tablets has been rejected by insurance.   42% The chance that your patient will receive their medication if you attempt a prior authorization is at 45%.   Generic therapeutic alternatives for Viibryd are available.   These first-line therapies may not require a prior authorization.  · Citalopram Hydrobromide   58%  · Escitalopram Oxalate   65%  · FLUoxetine HCl   18%  · PARoxetine HCl   50%  · Sertraline HCl   54%  · DULoxetine HCl   59%  · Venlafaxine HCl ER   60%  Generic alternatives listed above may not apply to all plans. Check patient's specific plan formulary.   Download a PDF of this information to discuss alternative therapies with the prescriber.

## 2020-04-23 ENCOUNTER — PATIENT MESSAGE (OUTPATIENT)
Dept: MEDICAL GROUP | Facility: PHYSICIAN GROUP | Age: 60
End: 2020-04-23

## 2020-04-27 RX ORDER — ESCITALOPRAM OXALATE 10 MG/1
10 TABLET ORAL DAILY
Qty: 90 TAB | Refills: 1 | Status: SHIPPED | OUTPATIENT
Start: 2020-04-27 | End: 2020-06-09

## 2020-05-11 DIAGNOSIS — E11.65 UNCONTROLLED TYPE 2 DIABETES MELLITUS WITH HYPERGLYCEMIA (HCC): ICD-10-CM

## 2020-05-11 RX ORDER — INSULIN DEGLUDEC 200 U/ML
80 INJECTION, SOLUTION SUBCUTANEOUS DAILY
Qty: 72 ML | Refills: 3 | Status: SHIPPED | OUTPATIENT
Start: 2020-05-11 | End: 2021-07-01 | Stop reason: SDUPTHER

## 2020-06-08 NOTE — PROGRESS NOTES
Endocrinology Clinic Progress Note  PCP: Marleny VARGAS M.D.  This encounter was conducted via Zoom .   Verbal consent was obtained. Patient's identity was verified.  HPI:  This encounter was conducted via Zoom.  Verbal consent was obtained. Patient's identity was verified.  Gem Barnett is a 59 y.o. old patient who comes in today for routine follow up of Management of Uncontrolled Type 2 Diabetes, Hypothyroidism, and Vitamin D Deficiency.  Having problems with trigger finger since September.     Hypothyroidism:  Currently taking Levothyroxine 125 mcg daily.  Lab results from 2/18/2020 shows a TSH of 1.94, FT4 of 1.28, FT3 of 2.20, and T3 of .79.     Vitamin D Deficiency:  Currently taking Vitamin D 10,000 units daily.  Vitamin D level on 2/18/20 was 33.3.       HPI:  Gem Barnett is a 59 y.o. old patient who comes in today for evaluation of above stated problem.    Most Recent HbA1c:   Lab Results   Component Value Date/Time    HBA1C 10.2 (A) 02/24/2020 01:37 PM        Current Diabetes Regimen:  GLP-1 Agent: Ozempic 1 mg weekly.    SGLT-2 Inhibitor:  Has tried them all and they make her itch.   Basal Insulin: Tresiba 80 units daily.    Other: Metformin ER 2 500 mg daily.       Testing blood sugars 1 to 3 times daily  Before Breakfast: 70-80's  After Dinner: 100's     Hypoglycemia:  She has had some in the 50's      ROS:  Constitutional: No weight loss  Cardiac: No palpitations or racing heart  Resp: No shortness of breath  Neuro: No numbness or tinging in feet  Endo: No heat or cold intolerance, no polyuria or polydipsia  All other systems were reviewed and were negative.    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 No pertinent surgical history.    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     Employer: [employer]     Comment: Special education   Social Needs   • Financial resource strain: Not on file   • Food insecurity     Worry: Not on file     Inability: Not on file   • Transportation needs     Medical: Not on file     Non-medical: Not on file   Tobacco Use   • Smoking status: Never Smoker   • Smokeless tobacco: Never Used   Substance and Sexual Activity   • Alcohol use: Yes     Alcohol/week: 0.6 oz     Types: 1 Glasses of wine per week     Comment: glass of wine a few times a year   • Drug use: No   • Sexual activity: Not Currently   Lifestyle   • Physical activity     Days per week: Not on file     Minutes per session: Not on file   • Stress: Not on file   Relationships   • Social connections     Talks on phone: Not on file     Gets together: Not on file     Attends Judaism service: Not on file     Active member of club or organization: Not on file     Attends meetings of clubs or organizations: Not on file     Relationship status: Not on file    • Intimate partner violence     Fear of current or ex partner: Not on file     Emotionally abused: Not on file     Physically abused: Not on file     Forced sexual activity: Not on file   Other Topics Concern   • Not on file   Social History Narrative   • Not on file       Family History:  Family History   Problem Relation Age of Onset   • Diabetes Mother    • Diabetes Brother         type 1,    • Hypertension Brother        Medications:    Current Outpatient Medications:   •  Insulin Degludec (TRESIBA FLEXTOUCH) 200 UNIT/ML Solution Pen-injector, Inject 80 Units as instructed every day., Disp: 72 mL, Rfl: 3  •  escitalopram (LEXAPRO) 10 MG Tab, Take 1 Tab by mouth every day., Disp: 90 Tab, Rfl: 1  •  Vilazodone HCl (VIIBRYD) 10 MG Tab, Take 1 Tab by mouth every day., Disp: 30 Tab, Rfl: 1  •  albuterol (PROAIR HFA) 108 (90 Base) MCG/ACT Aero Soln inhalation aerosol, Inhale 2 Puffs by mouth every 6 hours as needed for Shortness of Breath., Disp: 8.5 g, Rfl: 3  •  Blood Glucose Test Strips, Test strips order: One Touch Verio Sig: use 3x/day and prn ssx high or low sugar, Disp: 300 Strip, Rfl: 3  •  Lancets, Lancets order:One Touch Delica Sig: use 3x/day and prn ssx high or low sugar., Disp: 300 Each, Rfl: 3  •  Semaglutide, 1 MG/DOSE, (OZEMPIC, 1 MG/DOSE,) 2 MG/1.5ML Solution Pen-injector, Inject 1 mg as instructed every 7 days., Disp: 1 PEN, Rfl: 6  •  metFORMIN ER (GLUCOPHAGE XR) 500 MG TABLET SR 24 HR, TAKE 2 TABLETS BY MOUTH TWICE DAILY (Patient taking differently: Take 1,000 mg by mouth every day. Do Not Crush. Take With Meals), Disp: 360 Tab, Rfl: 3  •  levothyroxine (SYNTHROID) 125 MCG Tab, TAKE 1 TABLET BY MOUTH IN THE MORNING ON AN EMPTY STOMACH, Disp: 90 Tab, Rfl: 3  •  Cyanocobalamin (VITAMIN B 12 PO), Take 1,000 mcg by mouth every day., Disp: , Rfl:   •  Insulin Pen Needle 32 G x 4 mm, 1 Each by Does not apply route 3 times a day., Disp: 300 Each, Rfl: 3  •  ipratropium-albuterol (DUONEB) 0.5-2.5  (3) MG/3ML nebulizer solution, 3 mL by Nebulization route 4 times a day., Disp: 120 Bullet, Rfl: 1  •  Cholecalciferol (VITAMIN D3) 5000 units Tab, Take 10,000 Units by mouth., Disp: , Rfl:   •  hydrocodone-acetaminophen (VICODIN) 5-500 MG TABS, Take 1-2 Tabs by mouth every four hours as needed., Disp: , Rfl:   •  Multiple Vitamin (MULTIVITAMINS PO), Take  by mouth., Disp: , Rfl:     Labs: Reviewed    Physical Examination:  Vital signs: LMP 06/30/2011  There is no height or weight on file to calculate BMI.  General: No apparent distress, cooperative  Eyes: No scleral icterus or discharge  ENMT: Normal on external inspection of nose, lips, normal thyroid on inspection  Neck: No abnormal masses on inspection  Resp: Normal effort  Extremities: No visible edema  Neuro: Alert and oriented  Skin: No visible rash  Psych: Normal mood and affect, intact memory and able to make informed decisions      Assessment and Plan:    1. Uncontrolled type 2 diabetes mellitus with hyperglycemia (HCC)  Continue current regimen; doing really well; get A1c and other labs anytime after June 24 th, 2020.     - Discussed diabetic diet discussed in detail-plate method.  - She will test before meals and log.  - She will walk for 20-30 minutes daily.  - Reviewed medications and advised how to take.  - Discussed importance of immunizations and yearly eye exams. She saw Dr. Isaac for an eye exam on 1/15/20.  - Advised daily foot exams. Educated on signs of infection.   - Educated on need to stay well hydrated with water.  - Educated to call with any questions or problems.  2. Acquired hypothyroidism  Continue current dose of levothyroxine. .    3. Vitamin D deficiency  Cont vit d with food.     Return in about 3 months (around 9/9/2020).      Thank you for allowing me to participate in the care of this patient.    Dr. Earle Gonzalez      CC:   Marleny VARGAS M.D.    This note was created using voice recognition software (Dragon). The  accuracy of the dictation is limited by the abilities of the software. I have reviewed the note prior to signing, however some errors in grammar and context are still possible. If you have any questions related to this note please do not hesitate to contact our office.

## 2020-06-09 ENCOUNTER — TELEMEDICINE (OUTPATIENT)
Dept: MEDICAL GROUP | Facility: PHYSICIAN GROUP | Age: 60
End: 2020-06-09
Payer: COMMERCIAL

## 2020-06-09 ENCOUNTER — TELEMEDICINE (OUTPATIENT)
Dept: ENDOCRINOLOGY | Facility: MEDICAL CENTER | Age: 60
End: 2020-06-09
Payer: COMMERCIAL

## 2020-06-09 VITALS — HEIGHT: 67 IN | BODY MASS INDEX: 26.68 KG/M2 | WEIGHT: 170 LBS

## 2020-06-09 DIAGNOSIS — E78.5 DYSLIPIDEMIA: ICD-10-CM

## 2020-06-09 DIAGNOSIS — E55.9 VITAMIN D DEFICIENCY: ICD-10-CM

## 2020-06-09 DIAGNOSIS — E11.65 UNCONTROLLED TYPE 2 DIABETES MELLITUS WITH HYPERGLYCEMIA (HCC): ICD-10-CM

## 2020-06-09 DIAGNOSIS — E53.8 VITAMIN B 12 DEFICIENCY: ICD-10-CM

## 2020-06-09 DIAGNOSIS — E03.9 ACQUIRED HYPOTHYROIDISM: ICD-10-CM

## 2020-06-09 DIAGNOSIS — E03.9 HYPOTHYROIDISM, UNSPECIFIED TYPE: ICD-10-CM

## 2020-06-09 DIAGNOSIS — F41.8 ANXIOUS DEPRESSION: ICD-10-CM

## 2020-06-09 DIAGNOSIS — M65.331 TRIGGER MIDDLE FINGER OF RIGHT HAND: ICD-10-CM

## 2020-06-09 PROCEDURE — 99214 OFFICE O/P EST MOD 30 MIN: CPT | Mod: 95,CR | Performed by: INTERNAL MEDICINE

## 2020-06-09 RX ORDER — ESCITALOPRAM OXALATE 20 MG/1
20 TABLET ORAL DAILY
Qty: 90 TAB | Refills: 3 | Status: SHIPPED | OUTPATIENT
Start: 2020-06-09 | End: 2021-07-02

## 2020-06-09 NOTE — PROGRESS NOTES
Chief Complaint   Patient presents with   • Dyslipidemia     med mgt   • Hand Pain     right       HISTORY OF PRESENT ILLNESS: Patient is a 59 y.o. female established patient who presents today to discuss the medical issues below.  Exam by tele-med due to COVID restrictions patient gives consent.    Type II diabetes mellitus, uncontrolled (HCC)  Patient had video visit with Dr Gonzalez yesterday.  No changes made, felt to be doing well with endocrine, no recent A1c. Last I have is in Feb at 10.  Patient is reporting now in the low 100 range.      Hypothyroid  Continues with endocrine.      Trigger middle finger of right hand  Patient reports her right middle finger is locking down a lot.      Anxious depression  Patient continues to struggle with relationship break up.  Patient is not doing the counseling, she states she is doing well with talking to her sister.       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     Priority: Medium   • Type II diabetes mellitus, uncontrolled (HCC) 04/02/2014     Priority: Medium   • Hypothyroid      Priority: Medium   • Trigger middle finger of right hand 06/09/2020   • Mild reactive airways disease 03/20/2019   • Stress incontinence of urine 04/28/2017   • Acute meniscal tear of right knee 02/26/2016   • Atrophic vaginitis 02/26/2016   • Rash 09/22/2015   • Encounter for long-term (current) use of insulin ([facility]) 04/08/2015   • Hand pain, left 04/10/2014   • Postmenopausal 03/20/2014       Allergies:Farxiga [dapagliflozin]; Invokana [canagliflozin]; and Jardiance [empagliflozin]    Current Outpatient Medications   Medication Sig Dispense Refill   • escitalopram (LEXAPRO) 20 MG tablet Take 1 Tab by mouth every  day. 90 Tab 3   • Insulin Degludec (TRESIBA FLEXTOUCH) 200 UNIT/ML Solution Pen-injector Inject 80 Units as instructed every day. 72 mL 3   • Vilazodone HCl (VIIBRYD) 10 MG Tab Take 1 Tab by mouth every day. 30 Tab 1   • albuterol (PROAIR HFA) 108 (90 Base) MCG/ACT Aero Soln inhalation aerosol Inhale 2 Puffs by mouth every 6 hours as needed for Shortness of Breath. 8.5 g 3   • Blood Glucose Test Strips Test strips order: One Touch Verio Sig: use 3x/day and prn ssx high or low sugar 300 Strip 3   • Lancets Lancets order:One Touch Delica Sig: use 3x/day and prn ssx high or low sugar. 300 Each 3   • Semaglutide, 1 MG/DOSE, (OZEMPIC, 1 MG/DOSE,) 2 MG/1.5ML Solution Pen-injector Inject 1 mg as instructed every 7 days. 1 PEN 6   • metFORMIN ER (GLUCOPHAGE XR) 500 MG TABLET SR 24 HR TAKE 2 TABLETS BY MOUTH TWICE DAILY (Patient taking differently: Take 1,000 mg by mouth every day. Do Not Crush. Take With Meals) 360 Tab 3   • levothyroxine (SYNTHROID) 125 MCG Tab TAKE 1 TABLET BY MOUTH IN THE MORNING ON AN EMPTY STOMACH 90 Tab 3   • Cyanocobalamin (VITAMIN B 12 PO) Take 1,000 mcg by mouth every day.     • Insulin Pen Needle 32 G x 4 mm 1 Each by Does not apply route 3 times a day. 300 Each 3   • ipratropium-albuterol (DUONEB) 0.5-2.5 (3) MG/3ML nebulizer solution 3 mL by Nebulization route 4 times a day. 120 Bullet 1   • Cholecalciferol (VITAMIN D3) 5000 units Tab Take 10,000 Units by mouth.     • hydrocodone-acetaminophen (VICODIN) 5-500 MG TABS Take 1-2 Tabs by mouth every four hours as needed.     • Multiple Vitamin (MULTIVITAMINS PO) Take  by mouth.       No current facility-administered medications for this visit.          Past Medical History:   Diagnosis Date   • Anxious depression 2/26/2016   • Atrophic vaginitis 2/26/2016   • Avulsion of right knee 2/26/2016   • Diabetes    • HPV in female 2/26/2016   • Hypothyroid    • LBP (low back pain)    • Rash 9/22/2015   • Stress incontinence of urine 4/28/2017   • Trigger  "finger        Social History     Tobacco Use   • Smoking status: Never Smoker   • Smokeless tobacco: Never Used   Substance Use Topics   • Alcohol use: Yes     Alcohol/week: 0.6 oz     Types: 1 Glasses of wine per week     Comment: glass of wine a few times a year   • Drug use: No       Family Status   Relation Name Status   • Mo  Alive   • Bro   at age 46        trauma   • Fa  Alive   • Son  Alive   • Kike  Alive   • Kike  Alive     Family History   Problem Relation Age of Onset   • Diabetes Mother    • Diabetes Brother         type 1,    • Hypertension Brother        ROS:    Respiratory: Negative for cough, sputum production, shortness of breath or wheezing.    Cardiovascular: Negative for chest pain, palpitations, orthopnea, dyspnea with exertion or edema.   Gastrointestinal: Negative for GI upset, nausea, vomiting, abdominal pain, constipation or diarrhea.   Genitourinary: Negative for dysuria, urgency, hesitancy or frequency.       Exam:    Ht 1.702 m (5' 7\")   Wt 77.1 kg (170 lb)  Body mass index is 26.63 kg/m².  General:  Well nourished, well developed female in NAD.  Neuro: Grossly nonfocal.  Psych: Alert and oriented to person, place, and time. Appropriate mood and conversation.        This dictation was created using voice recognition software. I have made reasonable attempts to correct errors, however, errors of grammar and content may exist.          Assessment/Plan:    1. Uncontrolled type 2 diabetes mellitus with hyperglycemia (HCC)  Defer to endocrinology home monitoring has improved    2. Hypothyroidism, unspecified type  Clinically euthyroid labs are scheduled for next draw    3. Trigger middle finger of right hand  Referral placed for trigger finger  - REFERRAL TO HAND SURGERY    4. Anxious depression  Long discussion held.  She continues to decline counseling although referral was placed at the last visit.  She is generally improving after long discussion she does request an " increase in the Lexapro will go on up to 20 mg a day.  No evidence of suicidal ideation.  6-8-week follow-up.       Patient was seen for 25 minutes face to face of which more than 50% of the time was spent in counseling and coordination of care regarding the above problems.

## 2020-06-09 NOTE — ASSESSMENT & PLAN NOTE
Patient had video visit with Dr Gonzalez yesterday.  No changes made, felt to be doing well with endocrine, no recent A1c. Last I have is in Feb at 10.  Patient is reporting now in the low 100 range.

## 2020-06-09 NOTE — ASSESSMENT & PLAN NOTE
Patient continues to struggle with relationship break up.  Patient is not doing the counseling, she states she is doing well with talking to her sister.

## 2020-07-16 DIAGNOSIS — E11.65 UNCONTROLLED TYPE 2 DIABETES MELLITUS WITH HYPERGLYCEMIA (HCC): ICD-10-CM

## 2020-07-17 RX ORDER — SEMAGLUTIDE 1.34 MG/ML
INJECTION, SOLUTION SUBCUTANEOUS
Qty: 4 ML | Refills: 0 | Status: SHIPPED
Start: 2020-07-17 | End: 2020-08-24

## 2020-07-17 NOTE — TELEPHONE ENCOUNTER
Received request via: Pharmacy    Was the patient seen in the last year in this department? Yes    Does the patient have an active prescription (recently filled or refills available) for medication(s) requested? No     Ozempic (1 MG/DOSE) 2 MG/1.5ML Subcutaneous Solution Pen-injector         Will file in chart as: OZEMPIC, 1 MG/DOSE, 2 MG/1.5ML Solution Pen-injector        Sig: INJECT 1  PEN SUBCUTANEOUSLY ONCE A WEEK (EVERY  7  DAYS)    Disp:  4 mL    Refills:  0    Start: 7/16/2020    Class: Normal    Non-formulary    Last ordered: 6 months ago by Earle Gonzalez M.D.  Last refill: 7/11/2020    Rx #: [id number]

## 2020-08-07 ENCOUNTER — TELEPHONE (OUTPATIENT)
Dept: ENDOCRINOLOGY | Facility: MEDICAL CENTER | Age: 60
End: 2020-08-07

## 2020-08-07 NOTE — TELEPHONE ENCOUNTER
Patient is experiencing low blood sugars at night time. She woke up with a low blood sugar at 47, drank juice and went back to bed. Woke up this morning in the 40s.  Dinner last night pork chop corn on the cob and mushrooms.   PBJ with glass of milk for lunch.    In the past month she has been going low at night very consistently.     Patient has had some changes in medication. She is now on Lexapro.  She recently broke up with her boyfriend so not eating as much.     Still taking 80 units of tresiba  1mg ozempic weekly  And metformin 500mg 2 tabs in am

## 2020-08-10 NOTE — TELEPHONE ENCOUNTER
Phone Number Called: [phone] (home)       Call outcome: Spoke to patient regarding message below.    Message: Spoke to patient she did get that response on her mychart. We also made an appoinment for her to come in October.

## 2020-08-21 DIAGNOSIS — E11.65 UNCONTROLLED TYPE 2 DIABETES MELLITUS WITH HYPERGLYCEMIA (HCC): ICD-10-CM

## 2020-08-24 DIAGNOSIS — E03.9 HYPOTHYROIDISM, UNSPECIFIED TYPE: ICD-10-CM

## 2020-08-24 RX ORDER — LEVOTHYROXINE SODIUM 0.12 MG/1
TABLET ORAL
Qty: 90 TAB | Refills: 3 | Status: SHIPPED | OUTPATIENT
Start: 2020-08-24 | End: 2020-10-05

## 2020-08-24 RX ORDER — SEMAGLUTIDE 1.34 MG/ML
INJECTION, SOLUTION SUBCUTANEOUS
Qty: 4 ML | Refills: 0 | Status: SHIPPED | OUTPATIENT
Start: 2020-08-24 | End: 2020-11-17 | Stop reason: SDUPTHER

## 2020-08-24 NOTE — TELEPHONE ENCOUNTER
Received request via: Pharmacy    Was the patient seen in the last year in this department? Yes    Does the patient have an active prescription (recently filled or refills available) for medication(s) requested? No       OZEMPIC, 1 MG/DOSE, 2 MG/1.5ML Solution Pen-injector    Sig: INJECT 1  PEN SUBCUTANEOUSLY ONCE A WEEK (EVERY  7  DAYS)

## 2020-10-05 ENCOUNTER — OFFICE VISIT (OUTPATIENT)
Dept: ENDOCRINOLOGY | Facility: MEDICAL CENTER | Age: 60
End: 2020-10-05
Attending: INTERNAL MEDICINE
Payer: COMMERCIAL

## 2020-10-05 VITALS
WEIGHT: 169 LBS | DIASTOLIC BLOOD PRESSURE: 60 MMHG | HEIGHT: 67 IN | SYSTOLIC BLOOD PRESSURE: 100 MMHG | OXYGEN SATURATION: 97 % | HEART RATE: 90 BPM | BODY MASS INDEX: 26.53 KG/M2

## 2020-10-05 DIAGNOSIS — E03.9 HYPOTHYROIDISM, UNSPECIFIED TYPE: ICD-10-CM

## 2020-10-05 DIAGNOSIS — E11.65 UNCONTROLLED TYPE 2 DIABETES MELLITUS WITH HYPERGLYCEMIA (HCC): ICD-10-CM

## 2020-10-05 DIAGNOSIS — E55.9 VITAMIN D DEFICIENCY: ICD-10-CM

## 2020-10-05 LAB
HBA1C MFR BLD: 6.6 % (ref 0–5.6)
INT CON NEG: ABNORMAL
INT CON POS: ABNORMAL

## 2020-10-05 PROCEDURE — 99213 OFFICE O/P EST LOW 20 MIN: CPT | Performed by: INTERNAL MEDICINE

## 2020-10-05 PROCEDURE — 99214 OFFICE O/P EST MOD 30 MIN: CPT | Performed by: INTERNAL MEDICINE

## 2020-10-05 PROCEDURE — 83036 HEMOGLOBIN GLYCOSYLATED A1C: CPT | Performed by: INTERNAL MEDICINE

## 2020-10-05 RX ORDER — BLOOD SUGAR DIAGNOSTIC
STRIP MISCELLANEOUS
COMMUNITY
Start: 2020-09-21 | End: 2020-10-05

## 2020-10-05 NOTE — PROGRESS NOTES
"Endocrinology Clinic Progress Note  PCP: Marleny VARGAS M.D.    HPI:  Gem Barnett is a 60 y.o. old patient who is seen today for review of her endocrine problems.   1. Type 2 diabetes, uncontrolled     Current Diabetes Regimen:  Tresiba 70 units per day  Ozempic 1 mg per week  Metformin 1000 mg daily    Most Recent HbA1c:   Lab Results   Component Value Date/Time    HBA1C 6.6 (A) 10/05/2020 04:02 PM      Previous A1c was 10.2 on 02/24/2020    Patient has been testing blood sugars 4 times per day.   70-90 fasting  Hypoglycemia:  had one low of 35    Exercise: walking daily  Diet: \"healthy\" diet  in general  Daily Foot Exam: yes   Foot Exam:  Monofilament testing with a 10 gram force: sensation intact: intact bilaterally  Visual Inspection: Feet without maceration, ulcers, fissures.  Pedal pulses: intact bilaterally    2. Hypothyroid: currently on [id number] mcg daily, first thing in the morning on empty stomach.   Labs done at [facility] on 02/20/2020 show TSH of 1.94, T4 of 1.28 and Free T3 of 2.20     3. Vitamin D deficiency: currently on Vitamin D 82069 iu per week.   Labs on 02/20/2020 from [facility] show Vitamin D level of 33  ROS:  Constitutional: No weight loss  Cardiac: No palpitations or racing heart  Resp: No shortness of breath  Neuro: No numbness or tinging in feet  Endo: No heat or cold intolerance, no polyuria or polydipsia  All other systems were reviewed and were negative.    Current Outpatient Medications   Medication Sig Dispense Refill   • EUTHYROX 125 MCG Tab TAKE 1 TABLET BY MOUTH IN THE MORNING ON AN EMPTY STOMACH 90 Tab 3   • OZEMPIC, 1 MG/DOSE, 2 MG/1.5ML Solution Pen-injector INJECT 1  PEN SUBCUTANEOUSLY ONCE A WEEK (EVERY  7  DAYS) 4 mL 0   • escitalopram (LEXAPRO) 20 MG tablet Take 1 Tab by mouth every day. 90 Tab 3   • Insulin Degludec (TRESIBA FLEXTOUCH) 200 UNIT/ML Solution Pen-injector Inject 80 Units as instructed every day. (Patient taking differently: Inject 75 " "Units as instructed every day.) 72 mL 3   • albuterol (PROAIR HFA) 108 (90 Base) MCG/ACT Aero Soln inhalation aerosol Inhale 2 Puffs by mouth every 6 hours as needed for Shortness of Breath. 8.5 g 3   • Blood Glucose Test Strips Test strips order: One Touch Verio Sig: use 3x/day and prn ssx high or low sugar 300 Strip 3   • Lancets Lancets order:One Touch Delica Sig: use 3x/day and prn ssx high or low sugar. 300 Each 3   • metFORMIN ER (GLUCOPHAGE XR) 500 MG TABLET SR 24 HR TAKE 2 TABLETS BY MOUTH TWICE DAILY (Patient taking differently: Take 1,000 mg by mouth every day. Do Not Crush. Take With Meals) 360 Tab 3   • Cyanocobalamin (VITAMIN B 12 PO) Take 1,000 mcg by mouth every day.     • Insulin Pen Needle 32 G x 4 mm 1 Each by Does not apply route 3 times a day. 300 Each 3   • ipratropium-albuterol (DUONEB) 0.5-2.5 (3) MG/3ML nebulizer solution 3 mL by Nebulization route 4 times a day. 120 Bullet 1   • Cholecalciferol (VITAMIN D3) 5000 units Tab Take 10,000 Units by mouth.     • hydrocodone-acetaminophen (VICODIN) 5-500 MG TABS Take 1-2 Tabs by mouth every four hours as needed.     • Multiple Vitamin (MULTIVITAMINS PO) Take  by mouth.       No current facility-administered medications for this visit.      Labs: Reviewed    Physical Examination:  Vital signs: /60   Pulse 90   Ht 1.702 m (5' 7\")   Wt 76.7 kg (169 lb)   LMP 06/30/2011   SpO2 97%   BMI 26.47 kg/m²  Body mass index is 26.47 kg/m².  General: No apparent distress, cooperative  Eyes: No scleral icterus or discharge  ENMT: Normal on external inspection of nose, lips, normal thyroid exam  Neck: No abnormal masses on inspection  Resp: Normal effort, clear to auscultation bilaterally   CVS: Regular rate and rhythm, S1 S2 normal, no murmur   Extremities: No edema  Abdomen: abdominal obesity present  Neuro: Alert and oriented  Skin: No rash  Psych: Normal mood and affect, intact memory and able to make informed decisions    Assessment and Plan:  1. " Uncontrolled type 2 diabetes mellitus with hyperglycemia (HCC)  Doing superb.  Continue current regimen.    2. Hypothyroidism, unspecified type  Continue current dose of levothyroxine.    3. Vitamin D deficiency  Continue vit D with food as per HPI    Return in about 3 months (around 1/5/2021).    Thank you for allowing me to participate in the care of this patient.    Earle Gonzalez M.D.  10/05/20    CC:   Marleny VARGAS M.D.    This note was created using voice recognition software (Dragon). The accuracy of the dictation is limited by the abilities of the software. I have reviewed the note prior to signing, however some errors in grammar and context are still possible. If you have any questions related to this note please do not hesitate to contact our office.

## 2020-11-17 DIAGNOSIS — E11.65 UNCONTROLLED TYPE 2 DIABETES MELLITUS WITH HYPERGLYCEMIA (HCC): ICD-10-CM

## 2020-11-18 RX ORDER — SEMAGLUTIDE 1.34 MG/ML
1 INJECTION, SOLUTION SUBCUTANEOUS
Qty: 4 ML | Refills: 0 | Status: SHIPPED | OUTPATIENT
Start: 2020-11-18 | End: 2020-12-04

## 2020-11-20 ENCOUNTER — TELEPHONE (OUTPATIENT)
Dept: ENDOCRINOLOGY | Facility: MEDICAL CENTER | Age: 60
End: 2020-11-20

## 2020-12-04 DIAGNOSIS — E11.65 UNCONTROLLED TYPE 2 DIABETES MELLITUS WITH HYPERGLYCEMIA (HCC): ICD-10-CM

## 2020-12-04 RX ORDER — SEMAGLUTIDE 1.34 MG/ML
INJECTION, SOLUTION SUBCUTANEOUS
Qty: 4 ML | Refills: 0 | Status: SHIPPED | OUTPATIENT
Start: 2020-12-04 | End: 2021-02-23

## 2020-12-05 NOTE — TELEPHONE ENCOUNTER
Received request via: Pharmacy    Was the patient seen in the last year in this department? Yes    Does the patient have an active prescription (recently filled or refills available) for medication(s) requested? No     Ozempic (1 MG/DOSE) 2 MG/1.5ML Subcutaneous Solution Pen-injector        Will file in chart as: OZEMPIC, 1 MG/DOSE, 2 MG/1.5ML Solution Pen-injector   Sig: INJECT 1 PEN SUBCUTANEOUSLY ONCE A WEEK   Disp:  4 mL    Refills:  0   Start: 12/4/2020   Class: Normal   Non-formulary For: Uncontrolled type 2 diabetes mellitus with hyperglycemia (HCC)   Last ordered: 2 weeks ago by Armani Luis M.D. Last refill: 11/18/2020   Rx #: [id number]

## 2021-01-04 ENCOUNTER — NON-PROVIDER VISIT (OUTPATIENT)
Dept: ENDOCRINOLOGY | Facility: MEDICAL CENTER | Age: 61
End: 2021-01-04
Attending: INTERNAL MEDICINE
Payer: COMMERCIAL

## 2021-01-04 VITALS — WEIGHT: 166 LBS | BODY MASS INDEX: 26.06 KG/M2 | HEIGHT: 67 IN

## 2021-01-04 DIAGNOSIS — E03.9 HYPOTHYROIDISM, UNSPECIFIED TYPE: ICD-10-CM

## 2021-01-04 DIAGNOSIS — E11.65 UNCONTROLLED TYPE 2 DIABETES MELLITUS WITH HYPERGLYCEMIA (HCC): ICD-10-CM

## 2021-01-04 DIAGNOSIS — E03.9 HYPOTHYROIDISM, UNSPECIFIED TYPE: Primary | ICD-10-CM

## 2021-01-04 DIAGNOSIS — E55.9 VITAMIN D DEFICIENCY, UNSPECIFIED: ICD-10-CM

## 2021-01-04 DIAGNOSIS — Z79.4 ENCOUNTER FOR LONG-TERM (CURRENT) USE OF INSULIN (HCC): ICD-10-CM

## 2021-01-04 PROCEDURE — 99214 OFFICE O/P EST MOD 30 MIN: CPT | Mod: 95,CR | Performed by: NURSE PRACTITIONER

## 2021-01-04 NOTE — PROGRESS NOTES
"RN-CDE Note    Subjective:   Endocrinology Clinic Progress Note  PCP: Marleny VARGAS M.D.    HPI:  Gem Barnett is a 60 y.o. old patient who is seen today for review of of Uncontrolled Type 2 Diabetes, Hypothyroidism, and Vitamin D Deficiency.  Feeling good.  Fell and hurt her knees and neck.  DM:   Last A1c:   Lab Results   Component Value Date/Time    HBA1C 6.6 (A) 10/05/2020 04:02 PM      A1C GOAL: < 7    Diabetes Medications:   Tresiba 64 units per day  Ozempic .5 mg per week  Metformin 1000 mg daily    Taking above medications as prescribed: yes  Taking daily ASA: No    Exercise: sporadic irregular exercise, <half hour walking weekly  Diet: \"healthy\" diet  in general  Patient's body mass index is 26 kg/m². Exercise and nutrition counseling were performed at this visit.    Glucose monitoring frequency: Testing blood sugars twice daily.  Breakfast: .  Bedtime low 100's.  Hypoglycemic episodes: yes - she has gone down on the Tresiba.  Last Retinal Exam: on file and up-to-date.  She saw Dr. Montes on 1/15/20.  Daily Foot Exam: Yes     Lab Results   Component Value Date/Time    MICROALBCALC 10 02/14/2015    MALBCRT 15 03/26/2014 07:05 AM    MICROALBUR 0.7 03/26/2014 07:05 AM      ACR Albumin/Creatinine Ratio goal <30   Currently Rx ACE/ARB: No   Dyslipidemia:  Lab Results   Component Value Date/Time    CHOLSTRLTOT 55 02/14/2015    CHOLSTRLTOT 190 02/14/2015     02/14/2015     02/14/2015    HDL 55 02/14/2015    TRIGLYCERIDE 190 02/14/2015    TRIGLYCERIDE 132 02/14/2015       Currently Rx Statin: No     She  reports that she has never smoked. She has never used smokeless tobacco.      Plan:     Discussed and educated on:   - All medications, side effects and compliance (discussed carefully)  - Annual eye examinations at Ophthalmology  - Home glucose monitoring emphasized  - Weight control and daily exercise    Recommended medication changes: She had lab work done at [place] in [place] and rene " will get those results.  She will decrease her Tresiba to 60 units if she has more low blood sugars under 70.

## 2021-01-04 NOTE — PROGRESS NOTES
CHIEF COMPLAINT: Patient is here for follow up of Type 2 Diabetes Mellitus. Patient was presented for a telehealth consultation via secure and encrypted videoconferencing technology. This encounter was conducted via Zoom . Verbal consent was obtained. Patient's identity was verified.      HPI:     Gem Barnett is a 60 y.o. female with Type 2 Diabetes Mellitus and hypothyroidism for follow up. Previously seen by Dr. Gonzalez.  Visit completed via ZOOM virtual visit.    Labs from 10/05/2020 HbA1c is 6.6%    BG Diary:21 states she usually checks glucose 4-5 times per day. Has veered form this schedule over the  Holiday.  Breakfast: 100-120  Evenin-100  Reported hypoglycemic event prior to blood draw, in the 40s. Treated with orange juice.    Weight-unable to measure in office    Diabetes Complications   Retinopathy: No known retinopathy.  Last eye exam: 01/15/2020  Neuropathy: Denies paresthesias or numbness in hands or feet. Denies any foot wounds.  Exercise: Minimal.  Diet: Fair.  Patient's medications, allergies, and social histories were reviewed and updated as appropriate.    Hypothyroidism, unspecified type  No current labs to review.2020- TSH of 1.94, T4 of 1.28 and Free T3 of 2.20   Currently taking levothyroxine 125mcg QD.    Vitamin D deficiency, unspecified  No current labs to review.   Currently taking Vitamin D 10,000u daily.      ROS:     CONS:     No fever, no chills   EYES:     No diplopia, no blurry vision   CV:           No chest pain, no palpitations   PULM:     No SOB, no cough, no hemoptysis.   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     Allergies:  Farxiga [dapagliflozin], Invokana [canagliflozin], and Jardiance [empagliflozin]     Social History:  Social History     Tobacco Use   • Smoking status: Never Smoker   • Smokeless tobacco: Never Used   Substance Use Topics   • Alcohol use: Yes     Alcohol/week: 0.6 oz     Types: 1 Glasses of wine per week     Comment: glass of wine a few times a year   • Drug use: No        Family History:   family history includes Diabetes in her brother and mother; Hypertension in her brother.      PHYSICAL EXAM:   OBJECTIVE:  Vital signs: Ht 1.702 m (5' 7\")   Wt 75.3 kg (166 lb)   LMP 06/30/2011   BMI 26.00 kg/m²   GENERAL: Well-developed, well-nourished in no apparent distress.   EYE:  No ocular asymmetry, PERRLA  HENT: Pink, moist mucous membranes.    NECK: No thyromegaly. No visible lumps with neck extension.  CARDIOVASCULAR:  No murmurs  LUNGS: Clear breath sounds, no audible wheezes  ABDOMEN: Soft, nontender   EXTREMITIES: No clubbing, cyanosis, or edema. Trigger finger to right middle digit.  NEUROLOGICAL: No gross focal motor abnormalities   LYMPH: No cervical adenopathy seen  SKIN: No rashes, lesions.     Labs:  Lab Results   Component Value Date/Time    HBA1C 6.6 (A) 10/05/2020 04:02 PM        No results found for: WBC, RBC, HEMOGLOBIN, MCV, MCH, MCHC, RDW, MPV    Lab Results   Component Value Date/Time    SODIUM 133 (L) 03/26/2014 07:06 AM    POTASSIUM 4.2 03/26/2014 07:06 AM    CHLORIDE 99 03/26/2014 07:06 AM    CO2 28 03/26/2014 07:06 AM    " ANION 6.0 03/26/2014 07:06 AM    GLUCOSE 377 (H) 03/26/2014 07:06 AM    BUN 15 03/26/2014 07:06 AM    CREATININE 0.80 03/26/2014 07:06 AM    CALCIUM 9.6 03/26/2014 07:06 AM    ASTSGOT 24 03/26/2014 07:06 AM    ALTSGPT 26 03/26/2014 07:06 AM    TBILIRUBIN 0.4 03/26/2014 07:06 AM    ALBUMIN 4.4 03/26/2014 07:06 AM    TOTPROTEIN 7.4 03/26/2014 07:06 AM    GLOBULIN 3.0 03/26/2014 07:06 AM    AGRATIO 1.5 03/26/2014 07:06 AM       Lab Results   Component Value Date/Time    CHOLSTRLTOT 55 02/14/2015    CHOLSTRLTOT 190 02/14/2015    TRIGLYCERIDE 190 02/14/2015    TRIGLYCERIDE 132 02/14/2015     02/14/2015    HDL 55 02/14/2015     02/14/2015       Lab Results   Component Value Date/Time    MICROALBCALC 10 02/14/2015    MALBCRT 15 03/26/2014 07:05 AM    MICROALBUR 0.7 03/26/2014 07:05 AM        Lab Results   Component Value Date/Time    TSHULTRASEN 8.550 (H) 03/26/2014 0706     No results found for: FREEDIR  No results found for: FREET3  No results found for: THYSTIMIG        ASSESSMENT/PLAN:     1. Hypothyroidism, unspecified type  No current labs to review. Pt stated she had them drawn at [place], not received in clinic at this time.  Continue levothyroxine 125mcg QD.  Repeat labs in 3 months.    2. Uncontrolled type 2 diabetes mellitus with hyperglycemia (HCC)  No current labs to review.   Continue to monitor glucose 4-5 times daily.   Instructed to reduce tresiba to 60u if morning glucose in the 70s. Otherwise continue with 64u daily.   Continue Ozempic 0.5mg Q Week.  Continue Metformin 1000mg QD    3. Encounter for long-term (current) use of insulin (HCC)  No current labs to review.   Continue to monitor glucose 4-5 times daily.   Instructed to reduce tresiba to 60u if morning glucose in the 70s. Otherwise continue with 64u daily.     4. Vitamin D deficiency, unspecified  No current labs to review.   Continue Vitamin D 10,000u daily.      Follow up in 3 months. Repeat lab work 1 week prior to visit.        Thank you  for allowing me to participate in the diabetes care plan for this patient.    Cathy Rebolledo, APRN  01/04/21    CC:   Marleny VARGAS M.D.

## 2021-01-05 ENCOUNTER — TELEPHONE (OUTPATIENT)
Dept: ENDOCRINOLOGY | Facility: MEDICAL CENTER | Age: 61
End: 2021-01-05

## 2021-01-05 NOTE — TELEPHONE ENCOUNTER
Left Message for Gem MAHMOOD her recent lab results form [facility]. A1c was 8.5, up from 6.6 (10/05/2020). Pt encouraged to check glucose frequently and adjust food intake accordingly.

## 2021-02-10 RX ORDER — METFORMIN HYDROCHLORIDE 500 MG/1
TABLET, EXTENDED RELEASE ORAL
Qty: 360 TABLET | Refills: 0 | Status: SHIPPED | OUTPATIENT
Start: 2021-02-10 | End: 2021-10-27

## 2021-07-01 DIAGNOSIS — E11.65 UNCONTROLLED TYPE 2 DIABETES MELLITUS WITH HYPERGLYCEMIA (HCC): ICD-10-CM

## 2021-07-02 ENCOUNTER — TELEPHONE (OUTPATIENT)
Dept: ENDOCRINOLOGY | Facility: MEDICAL CENTER | Age: 61
End: 2021-07-02

## 2021-07-02 RX ORDER — ESCITALOPRAM OXALATE 20 MG/1
TABLET ORAL
Qty: 90 TABLET | Refills: 0 | Status: SHIPPED | OUTPATIENT
Start: 2021-07-02 | End: 2021-08-24 | Stop reason: SDUPTHER

## 2021-07-02 RX ORDER — INSULIN DEGLUDEC 200 U/ML
80 INJECTION, SOLUTION SUBCUTANEOUS DAILY
Qty: 72 ML | Refills: 3 | Status: SHIPPED | OUTPATIENT
Start: 2021-07-02

## 2021-07-02 NOTE — TELEPHONE ENCOUNTER
Received request via: Pharmacy    Was the patient seen in the last year in this department? Yes    Does the patient have an active prescription (recently filled or refills available) for medication(s) requested? No     REQUESTED MEDICATION:   Requested Prescriptions     Pending Prescriptions Disp Refills   • Insulin Degludec (TRESIBA FLEXTOUCH) 200 UNIT/ML Solution Pen-injector 72 mL 3     Sig: Inject 80 Units under the skin every day.

## 2021-07-09 ENCOUNTER — OFFICE VISIT (OUTPATIENT)
Dept: MEDICAL GROUP | Facility: PHYSICIAN GROUP | Age: 61
End: 2021-07-09
Payer: COMMERCIAL

## 2021-07-09 ENCOUNTER — HOSPITAL ENCOUNTER (OUTPATIENT)
Facility: MEDICAL CENTER | Age: 61
End: 2021-07-09
Attending: NURSE PRACTITIONER
Payer: COMMERCIAL

## 2021-07-09 VITALS
TEMPERATURE: 97.3 F | DIASTOLIC BLOOD PRESSURE: 72 MMHG | OXYGEN SATURATION: 98 % | WEIGHT: 168.4 LBS | RESPIRATION RATE: 16 BRPM | HEIGHT: 65 IN | HEART RATE: 78 BPM | SYSTOLIC BLOOD PRESSURE: 120 MMHG | BODY MASS INDEX: 28.06 KG/M2

## 2021-07-09 DIAGNOSIS — Z12.4 CERVICAL CANCER SCREENING: ICD-10-CM

## 2021-07-09 DIAGNOSIS — Z12.39 ENCOUNTER FOR BREAST CANCER SCREENING USING NON-MAMMOGRAM MODALITY: ICD-10-CM

## 2021-07-09 PROCEDURE — 99000 SPECIMEN HANDLING OFFICE-LAB: CPT | Performed by: NURSE PRACTITIONER

## 2021-07-09 PROCEDURE — 99396 PREV VISIT EST AGE 40-64: CPT | Performed by: NURSE PRACTITIONER

## 2021-07-09 PROCEDURE — 88175 CYTOPATH C/V AUTO FLUID REDO: CPT

## 2021-07-09 PROCEDURE — 87624 HPV HI-RISK TYP POOLED RSLT: CPT

## 2021-07-09 ASSESSMENT — PATIENT HEALTH QUESTIONNAIRE - PHQ9
2. FEELING DOWN, DEPRESSED, IRRITABLE, OR HOPELESS: SEVERAL DAYS
SUM OF ALL RESPONSES TO PHQ9 QUESTIONS 1 AND 2: 2
3. TROUBLE FALLING OR STAYING ASLEEP OR SLEEPING TOO MUCH: NOT AT ALL
6. FEELING BAD ABOUT YOURSELF - OR THAT YOU ARE A FAILURE OR HAVE LET YOURSELF OR YOUR FAMILY DOWN: NOT AL ALL
7. TROUBLE CONCENTRATING ON THINGS, SUCH AS READING THE NEWSPAPER OR WATCHING TELEVISION: NEARLY EVERY DAY
SUM OF ALL RESPONSES TO PHQ QUESTIONS 1-9: 5
9. THOUGHTS THAT YOU WOULD BE BETTER OFF DEAD, OR OF HURTING YOURSELF: NOT AT ALL
1. LITTLE INTEREST OR PLEASURE IN DOING THINGS: SEVERAL DAYS
4. FEELING TIRED OR HAVING LITTLE ENERGY: NOT AT ALL
8. MOVING OR SPEAKING SO SLOWLY THAT OTHER PEOPLE COULD HAVE NOTICED. OR THE OPPOSITE, BEING SO FIGETY OR RESTLESS THAT YOU HAVE BEEN MOVING AROUND A LOT MORE THAN USUAL: NOT AT ALL
5. POOR APPETITE OR OVEREATING: NOT AT ALL

## 2021-07-09 NOTE — PROGRESS NOTES
SUBJECTIVE: 60 y.o. female for annual routine gynecologic exam    Health Maintenance: Reviewed rationale for breast cancer and colon cancer screening.  Patient declines colon cancer screening at this time.  She would like to have [facility] mammogram done.  She understands that she will likely have to pay out of pocket.  Chief Complaint   Patient presents with   • Gynecologic Exam       OB History    Para Term  AB Living   3 3 0 0 0 0   SAB TAB Ectopic Molar Multiple Live Births   0 0 0 0 0 0      Last Pap: 3 years ago  Social History     Substance and Sexual Activity   Sexual Activity Not Currently     H/O Abnormal Pap - no  She  reports that she has never smoked. She has never used smokeless tobacco.        Allergies: Farxiga [dapagliflozin], Invokana [canagliflozin], and Jardiance [empagliflozin]     ROS:    LMP: about 10 years ago  Reports no menopause symptoms of hot flashes, night sweats, sleep disruption, mood changes, vaginal dryness.   No significant bloating/fluid retention, pelvic pain, or dyspareunia. No vaginal discharge   No breast tenderness, mass, nipple discharge, changes in size or contour, or abnormal cyclic discomfort.  No urinary tract symptoms, mild stress incontinence.   No abdominal pain, change in bowel habits, black or bloody stools.    No unusual headaches, no visual changes, menstrual migraines   No prolonged cough. No dyspnea or chest pain on exertion.  No depression, labile mood, anxiety ,libido changes, insomnia.  No new/concerning skin lesions, concerns.     Exercise: minimal exercise  Preventive Care:  Health Maintenance Topics with due status: Overdue       Topic Date Due    HEPATITIS C SCREENING Never done    IMM PNEUMOCOCCAL VACCINE: 0-64 Years Never done    MAMMOGRAM Never done    COLONOSCOPY Never done    IMM ZOSTER VACCINES Never done    FASTING LIPID PROFILE 2016    URINE ACR / MICROALBUMIN 2016    IMM HEP B VACCINE 2016    SERUM CREATININE  09/01/2019    PAP SMEAR 04/28/2020    RETINAL SCREENING 01/15/2021    A1C SCREENING 04/05/2021       Current medicines (including changes today)  Current Outpatient Medications   Medication Sig Dispense Refill   • escitalopram (LEXAPRO) 20 MG tablet Take 1 tablet by mouth once daily 90 tablet 0   • Insulin Degludec (TRESIBA FLEXTOUCH) 200 UNIT/ML Solution Pen-injector Inject 80 Units under the skin every day. 72 mL 3   • OZEMPIC, 1 MG/DOSE, 2 MG/1.5ML Solution Pen-injector INJECT 1 PEN SUBCUTANSEOUSLY  ONCE A WEEK 6 Each 3   • metFORMIN ER (GLUCOPHAGE XR) 500 MG TABLET SR 24 HR Take 2 tablets by mouth twice daily 360 tablet 0   • EUTHYROX 125 MCG Tab TAKE 1 TABLET BY MOUTH IN THE MORNING ON AN EMPTY STOMACH 90 Tab 3   • albuterol (PROAIR HFA) 108 (90 Base) MCG/ACT Aero Soln inhalation aerosol Inhale 2 Puffs by mouth every 6 hours as needed for Shortness of Breath. 8.5 g 3   • Blood Glucose Test Strips Test strips order: One Touch Verio Sig: use 3x/day and prn ssx high or low sugar 300 Strip 3   • Lancets Lancets order:One Touch Delica Sig: use 3x/day and prn ssx high or low sugar. 300 Each 3   • Cyanocobalamin (VITAMIN B 12 PO) Take 1,000 mcg by mouth every day.     • Insulin Pen Needle 32 G x 4 mm 1 Each by Does not apply route 3 times a day. 300 Each 3   • ipratropium-albuterol (DUONEB) 0.5-2.5 (3) MG/3ML nebulizer solution 3 mL by Nebulization route 4 times a day. 120 Bullet 1   • Cholecalciferol (VITAMIN D3) 5000 units Tab Take 10,000 Units by mouth.     • hydrocodone-acetaminophen (VICODIN) 5-500 MG TABS Take 1-2 Tabs by mouth every four hours as needed.     • Multiple Vitamin (MULTIVITAMINS PO) Take  by mouth.       No current facility-administered medications for this visit.     She  has a past medical history of Anxious depression (2/26/2016), Atrophic vaginitis (2/26/2016), Avulsion of right knee (2/26/2016), Diabetes, HPV in female (2/26/2016), Hypothyroid, LBP (low back pain), Rash (9/22/2015), Stress  "incontinence of urine (2017), and Trigger finger.  She  has no past surgical history on file.     Family History:   Family History   Problem Relation Age of Onset   • Diabetes Mother    • Diabetes Brother         type 1,    • Hypertension Brother           OBJECTIVE:   /72 (BP Location: Left arm, Patient Position: Sitting, BP Cuff Size: Adult)   Pulse 78   Temp 36.3 °C (97.3 °F) (Temporal)   Resp 16   Ht 1.651 m (5' 5\") Comment: with shoes  Wt 76.4 kg (168 lb 6.4 oz) Comment: with shoes  LMP 2011   SpO2 98%   BMI 28.02 kg/m²   Body mass index is 28.02 kg/m².    HEAD AND NECK:  Ears normal.  Throat, oral cavity and tongue normal.  Neck supple. No adenopathy or masses in the neck or supraclavicular regions.  No carotid bruits. No thyromegaly. NEURO: Cranial nerves are normal.   CHEST:  Clear, good air entry, no wheezes or rales. HEART:  Regular rate and rhythm.  No edema or JVD. ABDOMEN:  Soft without tenderness, guarding, mass or organomegaly.  SKIN: color normal, vascularity normal, no edema, temperature normal   No rashes or suspicious skin lesions noted.     Breast Exam: Performed with instruction during examination. No axillary lymphadenopathy, no skin changes, no dominant masses. No nipple retraction    Pelvic Exam -  Normal external genitalia with no lesions. Vaginal mucosa with atrophic changes and scant discharge. Cervix with no visible lesions. No cervical motion tenderness. Uterus is mildly enlarged with no masses. No adnexal tenderness or enlargement appreciated. Sure Path Pap obtained, and specimen(s) sent to lab  A chaperone was offered to the patient during today's exam. Patient declined chaperone.      <ASSESSMENT and PLAN>  1. Encounter for breast cancer screening using non-mammogram modality  Ordered as patient requested.  She is aware she will likely have to pay  - US-SCREENING WHOLE BREAST BILATERAL (3D SCREENING); Future    2. Cervical cancer screening  Mildly " enlarged uterus.  No other abnormalities found on exam today.  Pending pathology results.  Will notify patient of results  If normal, recommend repeat screening in 3 to 5 years.  - THINPREP PAP WITH HPV; Future      Patient will return to clinic next month with primary care provider as previously scheduled or sooner if needed.

## 2021-07-11 DIAGNOSIS — Z12.4 CERVICAL CANCER SCREENING: ICD-10-CM

## 2021-07-12 ENCOUNTER — OFFICE VISIT (OUTPATIENT)
Dept: ENDOCRINOLOGY | Facility: MEDICAL CENTER | Age: 61
End: 2021-07-12
Attending: NURSE PRACTITIONER
Payer: COMMERCIAL

## 2021-07-12 VITALS
DIASTOLIC BLOOD PRESSURE: 56 MMHG | OXYGEN SATURATION: 100 % | HEART RATE: 100 BPM | WEIGHT: 168.9 LBS | RESPIRATION RATE: 14 BRPM | HEIGHT: 67 IN | BODY MASS INDEX: 26.51 KG/M2 | SYSTOLIC BLOOD PRESSURE: 110 MMHG

## 2021-07-12 DIAGNOSIS — E03.9 HYPOTHYROIDISM (ACQUIRED): ICD-10-CM

## 2021-07-12 DIAGNOSIS — Z79.4 ENCOUNTER FOR LONG-TERM (CURRENT) USE OF INSULIN (HCC): ICD-10-CM

## 2021-07-12 DIAGNOSIS — E78.5 HYPERLIPIDEMIA, UNSPECIFIED HYPERLIPIDEMIA TYPE: ICD-10-CM

## 2021-07-12 DIAGNOSIS — E11.65 UNCONTROLLED TYPE 2 DIABETES MELLITUS WITH HYPERGLYCEMIA (HCC): ICD-10-CM

## 2021-07-12 DIAGNOSIS — E03.9 HYPOTHYROIDISM, UNSPECIFIED TYPE: ICD-10-CM

## 2021-07-12 LAB
CYTOLOGY REG CYTOL: NORMAL
HBA1C MFR BLD: 8.5 % (ref 0–5.6)
HPV HR 12 DNA CVX QL NAA+PROBE: NEGATIVE
HPV16 DNA SPEC QL NAA+PROBE: NEGATIVE
HPV18 DNA SPEC QL NAA+PROBE: NEGATIVE
INT CON NEG: ABNORMAL
INT CON POS: ABNORMAL
SPECIMEN SOURCE: NORMAL

## 2021-07-12 PROCEDURE — 83036 HEMOGLOBIN GLYCOSYLATED A1C: CPT | Performed by: NURSE PRACTITIONER

## 2021-07-12 PROCEDURE — 99214 OFFICE O/P EST MOD 30 MIN: CPT | Performed by: NURSE PRACTITIONER

## 2021-07-12 PROCEDURE — 99212 OFFICE O/P EST SF 10 MIN: CPT | Performed by: NURSE PRACTITIONER

## 2021-07-12 RX ORDER — SEMAGLUTIDE 1.34 MG/ML
INJECTION, SOLUTION SUBCUTANEOUS
COMMUNITY
Start: 2021-07-08 | End: 2021-11-24 | Stop reason: SDUPTHER

## 2021-07-12 RX ORDER — SIMVASTATIN 10 MG
10 TABLET ORAL EVERY EVENING
Qty: 90 TABLET | Refills: 3 | Status: SHIPPED | OUTPATIENT
Start: 2021-07-12 | End: 2022-06-23

## 2021-07-12 NOTE — PROGRESS NOTES
CHIEF COMPLAINT: Patient is here for follow up of Type 2 Diabetes Mellitus.    HPI:     Gem Barnett is a 60 y.o. female with for continued evaluation & treatment of the followin. Type 2 Diabetes Mellitus   Pt reports she's been doing well, increased activity over the last several months but increased stress with her family.     Current Diabetes Regimen:   Tresiba to 64u QD  Ozempic 1mg Q Week  Metformin 1000mg BID    Patient unable to tolerate Jardiance, Invokana and Farxiga.     POC A1c 2021: 8.5%  Serum Glycohemoglobin 2020: 8.5%  Labs from 10/05/2020 HbA1c is 6.6%    BG Diary: 2021 1-2 times per day.   Breakfast: 100-119    Hypoglycemia 1-2 times per month in the morning. Patient isn't hypoglycemic aware.  States his lower legs typically happens when she has worked outside all day long and she has not checked her blood sugar prior to going to bed.    Weight unchanged from prior appointments.    Diabetes Complications   Retinopathy: No known retinopathy.  Last eye exam: Overdue- 2020-Dr. Brown.  Neuropathy: Denies paresthesias or numbness in hands or feet. Denies any foot wounds.  Exercise: Minimal.  Diet: Fair.  Patient's medications, allergies, and social histories were reviewed and updated as appropriate.    2.  Hypothyroidism  Currently taking levothyroxine 125mcg QD.  Patient takes thyroid hormone on an empty stomach 1 hour prior to breakfast.  Patient denies constipation, cold intolerance and/or mental fogginess.  Patient also denies palpitations and increased anxiousness.  Labs 2020: TSH 0.06, free T4 1.45    3.  Vitamin D deficiency  Currently taking Vitamin D 10,000u daily.  Labs 2020: Vitamin D,25-  25.2    4.  Hyperlipidemia  No current statin.  Labs 2020: Cholesterol 197, HDL 62, , triglycerides 80.        ROS:     CONS:     No fever, no chills   EYES:     No diplopia, no blurry vision   CV:           No chest pain, no palpitations   PULM:      "No SOB, no cough, no hemoptysis.   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 No pertinent surgical history.     Allergies:  Farxiga [dapagliflozin], Invokana [canagliflozin], and Jardiance [empagliflozin]     Social History:  Social History     Tobacco Use   • Smoking status: Never Smoker   • Smokeless tobacco: Never Used   Substance Use Topics   • Alcohol use: Yes     Alcohol/week: 0.6 oz     Types: 1 Glasses of wine per week     Comment: glass of wine a few times a year   • Drug use: No        Family History:   family history includes Diabetes in her brother and mother; Hypertension in her brother.      PHYSICAL EXAM:   OBJECTIVE:  Vital signs: /56 (BP Location: Left arm, Patient Position: Sitting, BP Cuff Size: Adult)   Pulse 100   Resp 14   Ht 1.702 m (5' 7\")   Wt 76.6 kg (168 lb 14.4 oz)   LMP 06/30/2011   SpO2 100%   BMI 26.45 kg/m²   GENERAL: Well-developed, well-nourished in no apparent distress.   EYE:  No ocular asymmetry, PERRLA  HENT: Pink, moist mucous membranes.    NECK: No thyromegaly. No visible lumps with neck extension.  CARDIOVASCULAR:  No murmurs  LUNGS: Clear breath sounds, no audible wheezes  ABDOMEN: Soft, " nontender   EXTREMITIES: No clubbing, cyanosis, or edema. Trigger finger to right middle digit.  NEUROLOGICAL: No gross focal motor abnormalities   LYMPH: No cervical adenopathy seen  SKIN: No rashes, lesions.       ASSESSMENT/PLAN:     1. Hypothyroidism, unspecified type  Stable.  Continue levothyroxine 125mcg QD.  Repeat thyroid function in 3 months.    2. Uncontrolled type 2 diabetes mellitus with hyperglycemia (HCC)  Unstable.  Continue Diabetes Regimen:   Tresiba to 64u QD  Ozempic 1mg Q Week  Metformin 1000mg BID    3. Encounter for long-term (current) use of insulin (HCC)  Stable.    4. Vitamin D deficiency, unspecified  No current labs to review.   Continue Vitamin D 10,000u daily.    5. Hyperlipidemia  Unstable.  Recommend starting simvastatin 10 mg daily.     Repeat lab work 1 week prior to visit.   Complete point-of-care A1c at next appointment.  Next appointment in 3 months.      Thank you  for allowing me to participate in the diabetes care plan for this patient.    Cathy Rebolledo, SIMBA  07/12/2021    CC:   Marleny VARGAS M.D.

## 2021-08-24 RX ORDER — ESCITALOPRAM OXALATE 20 MG/1
TABLET ORAL
Qty: 90 TABLET | Refills: 3 | Status: SHIPPED | OUTPATIENT
Start: 2021-08-24 | End: 2022-10-12 | Stop reason: SDUPTHER

## 2021-08-24 NOTE — TELEPHONE ENCOUNTER
Pt is requesting a refill of lexapro.    Received request via: Patient    Was the patient seen in the last year in this department? Yes    Does the patient have an active prescription (recently filled or refills available) for medication(s) requested? No

## 2021-10-18 ENCOUNTER — APPOINTMENT (OUTPATIENT)
Dept: ENDOCRINOLOGY | Facility: MEDICAL CENTER | Age: 61
End: 2021-10-18
Attending: NURSE PRACTITIONER
Payer: COMMERCIAL

## 2021-10-27 RX ORDER — METFORMIN HYDROCHLORIDE 500 MG/1
TABLET, EXTENDED RELEASE ORAL
Qty: 360 TABLET | Refills: 0 | Status: SHIPPED | OUTPATIENT
Start: 2021-10-27 | End: 2021-11-24 | Stop reason: SDUPTHER

## 2021-11-16 ENCOUNTER — OFFICE VISIT (OUTPATIENT)
Dept: URGENT CARE | Facility: PHYSICIAN GROUP | Age: 61
End: 2021-11-16
Payer: COMMERCIAL

## 2021-11-16 ENCOUNTER — HOSPITAL ENCOUNTER (OUTPATIENT)
Facility: MEDICAL CENTER | Age: 61
End: 2021-11-16
Attending: PHYSICIAN ASSISTANT
Payer: COMMERCIAL

## 2021-11-16 VITALS
WEIGHT: 170 LBS | TEMPERATURE: 98.1 F | SYSTOLIC BLOOD PRESSURE: 110 MMHG | OXYGEN SATURATION: 98 % | DIASTOLIC BLOOD PRESSURE: 68 MMHG | HEIGHT: 67 IN | BODY MASS INDEX: 26.68 KG/M2 | RESPIRATION RATE: 16 BRPM | HEART RATE: 94 BPM

## 2021-11-16 DIAGNOSIS — R30.0 DYSURIA: ICD-10-CM

## 2021-11-16 DIAGNOSIS — N30.01 ACUTE CYSTITIS WITH HEMATURIA: ICD-10-CM

## 2021-11-16 DIAGNOSIS — R30.9 PAINFUL URINATION: ICD-10-CM

## 2021-11-16 LAB
APPEARANCE UR: ABNORMAL
BILIRUB UR STRIP-MCNC: ABNORMAL MG/DL
COLOR UR AUTO: ABNORMAL
GLUCOSE UR STRIP.AUTO-MCNC: NEGATIVE MG/DL
KETONES UR STRIP.AUTO-MCNC: ABNORMAL MG/DL
LEUKOCYTE ESTERASE UR QL STRIP.AUTO: ABNORMAL
NITRITE UR QL STRIP.AUTO: NEGATIVE
PH UR STRIP.AUTO: 7 [PH] (ref 5–8)
PROT UR QL STRIP: 100 MG/DL
RBC UR QL AUTO: ABNORMAL
SP GR UR STRIP.AUTO: 1.02
UROBILINOGEN UR STRIP-MCNC: 1 MG/DL

## 2021-11-16 PROCEDURE — 81002 URINALYSIS NONAUTO W/O SCOPE: CPT | Performed by: PHYSICIAN ASSISTANT

## 2021-11-16 PROCEDURE — 99214 OFFICE O/P EST MOD 30 MIN: CPT | Performed by: PHYSICIAN ASSISTANT

## 2021-11-16 PROCEDURE — 87086 URINE CULTURE/COLONY COUNT: CPT

## 2021-11-16 RX ORDER — PHENAZOPYRIDINE HYDROCHLORIDE 200 MG/1
200 TABLET, FILM COATED ORAL 3 TIMES DAILY
Qty: 6 TABLET | Refills: 0 | Status: SHIPPED | OUTPATIENT
Start: 2021-11-16 | End: 2021-11-18

## 2021-11-16 RX ORDER — SULFAMETHOXAZOLE AND TRIMETHOPRIM 800; 160 MG/1; MG/1
1 TABLET ORAL EVERY 12 HOURS
Qty: 20 TABLET | Refills: 0 | Status: SHIPPED | OUTPATIENT
Start: 2021-11-16 | End: 2021-11-26

## 2021-11-16 NOTE — PROGRESS NOTES
Chief Complaint   Patient presents with   • Painful Urination     started today   • Urinary Frequency       HISTORY OF PRESENT ILLNESS: Patient is a 61 y.o. female who presents today because she has a 1 to 2-day history of left-sided flank pain and then started to have urinary urgency and painful urination this morning.  She has not been taking any medications for current symptoms.  Denies any fevers, chills, nausea, vomiting or diarrhea.    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   • Rash 09/22/2015   • Encounter for long-term (current) use of insulin ([facility]) 04/08/2015   • Hand pain, left 04/10/2014   • Type II diabetes mellitus, uncontrolled ([facility]) 04/02/2014   • Recurrent major depressive disorder ([facility]) 03/20/2014   • Postmenopausal 03/20/2014   • Hypothyroid    • Low back pain        Allergies:Farxiga [dapagliflozin], Invokana [canagliflozin], and Jardiance [empagliflozin]    Current Outpatient Medications Ordered in Epic   Medication Sig Dispense Refill   • sulfamethoxazole-trimethoprim (BACTRIM DS) 800-160 MG tablet Take 1 Tablet by mouth every 12 hours for 10 days. 20 Tablet 0   • phenazopyridine (PYRIDIUM) 200 MG Tab Take 1 Tablet by mouth 3 times a day for 2 days. 6 Tablet 0   • metFORMIN ER (GLUCOPHAGE XR) 500 MG TABLET SR 24 HR Take 2 tablets by mouth twice daily 360 Tablet 0   • escitalopram (LEXAPRO) 20 MG tablet Take 1 tablet by mouth once daily 90 Tablet 3   • OZEMPIC, 1 MG/DOSE, 4 MG/3ML Solution Pen-injector INJECT 1 MG SUBCUTANEOUSLY ONCE A WEEK     • simvastatin (ZOCOR) 10 MG Tab Take 1 tablet by mouth every evening. 90 tablet 3   • Insulin Degludec  (TRESIBA FLEXTOUCH) 200 UNIT/ML Solution Pen-injector Inject 80 Units under the skin every day. 72 mL 3   • OZEMPIC, 1 MG/DOSE, 2 MG/1.5ML Solution Pen-injector INJECT 1 PEN SUBCUTANSEOUSLY  ONCE A WEEK 6 Each 3   • EUTHYROX 125 MCG Tab TAKE 1 TABLET BY MOUTH IN THE MORNING ON AN EMPTY STOMACH 90 Tab 3   • albuterol (PROAIR HFA) 108 (90 Base) MCG/ACT Aero Soln inhalation aerosol Inhale 2 Puffs by mouth every 6 hours as needed for Shortness of Breath. 8.5 g 3   • Blood Glucose Test Strips Test strips order: One Touch Verio Sig: use 3x/day and prn ssx high or low sugar 300 Strip 3   • Lancets Lancets order:One Touch Delica Sig: use 3x/day and prn ssx high or low sugar. 300 Each 3   • Insulin Pen Needle 32 G x 4 mm 1 Each by Does not apply route 3 times a day. 300 Each 3   • ipratropium-albuterol (DUONEB) 0.5-2.5 (3) MG/3ML nebulizer solution 3 mL by Nebulization route 4 times a day. 120 Bullet 1   • hydrocodone-acetaminophen (VICODIN) 5-500 MG TABS Take 1-2 Tabs by mouth every four hours as needed.     • Multiple Vitamin (MULTIVITAMINS PO) Take  by mouth. (Patient not taking: Reported on 2021)       No current [facility]-ordered facility-administered medications on file.       Past Medical History:   Diagnosis Date   • Anxious depression 2016   • Atrophic vaginitis 2016   • Avulsion of right knee 2016   • Diabetes    • HPV in female 2016   • Hypothyroid    • LBP (low back pain)    • Rash 2015   • Stress incontinence of urine 2017   • Trigger finger        Social History     Tobacco Use   • Smoking status: Never Smoker   • Smokeless tobacco: Never Used   Substance Use Topics   • Alcohol use: Yes     Alcohol/week: 0.6 oz     Types: 1 Glasses of wine per week     Comment: glass of wine a few times a year   • Drug use: No       Family Status   Relation Name Status   • Mo  Alive   • Bro   at age 46        trauma   • Fa  Alive   • Son  Alive   • Kike  Alive   • Kike  Alive     Family  "History   Problem Relation Age of Onset   • Diabetes Mother    • Diabetes Brother         type 1,    • Hypertension Brother        ROS:  Review of Systems   Constitutional: Negative for fever, chills, weight loss and malaise/fatigue.   HENT: Negative for ear pain, nosebleeds, congestion, sore throat and neck pain.    Eyes: Negative for blurred vision.   Respiratory: Negative for cough, sputum production, shortness of breath and wheezing.    Cardiovascular: Negative for chest pain, palpitations, orthopnea and leg swelling.   Gastrointestinal: Negative for heartburn, nausea, vomiting and abdominal pain.   Genitourinary: Positive for left flank pain, dysuria, urgency and frequency.     Exam:  /68   Pulse 94   Temp 36.7 °C (98.1 °F) (Temporal)   Resp 16   Ht 1.702 m (5' 7\")   Wt 77.1 kg (170 lb)   SpO2 98%   General:  Well nourished, well developed female in NAD  Head:Normocephalic, atraumatic  Eyes: PERRLA, EOM within normal limits, no conjunctival injection, no scleral icterus, visual fields and acuity grossly intact.  Nose: Symmetrical without tenderness, no discharge.  Mouth: reasonable hygiene, no erythema exudates or tonsillar enlargement.  Neck: no masses, range of motion within normal limits, no tracheal deviation. No obvious thyroid enlargement.  Extremities: no clubbing, cyanosis, or edema.    Urinalysis in the office has small amount of bili, trace ketones, large blood, 100 protein, moderate leuks    Please note that this dictation was created using voice recognition software. I have made every reasonable attempt to correct obvious errors, but I expect that there are errors of grammar and possibly content that I did not discover before finalizing the note.    Assessment/Plan:  1. Painful urination  POCT Urinalysis   2. Acute cystitis with hematuria  Urine Culture    sulfamethoxazole-trimethoprim (BACTRIM DS) 800-160 MG tablet   3. Dysuria  phenazopyridine (PYRIDIUM) 200 MG Tab   Increase " p.o. fluids    Followup with primary care in the next 7-10 days if not significantly improving, return to the urgent care or go to the emergency room sooner for any worsening of symptoms.

## 2021-11-19 LAB
BACTERIA UR CULT: NORMAL
SIGNIFICANT IND 70042: NORMAL
SITE SITE: NORMAL
SOURCE SOURCE: NORMAL

## 2021-11-23 ENCOUNTER — TELEPHONE (OUTPATIENT)
Dept: MEDICAL GROUP | Facility: PHYSICIAN GROUP | Age: 61
End: 2021-11-23

## 2021-11-23 NOTE — TELEPHONE ENCOUNTER
----- Message from Frankie Carey P.A.-C. sent at 11/21/2021  5:08 AM PST -----  Please notify the patient that the urine culture showed no signs of bacterial infection.  Follow up with PCP if still symptomatic or symptoms persist.

## 2021-11-24 DIAGNOSIS — E11.65 UNCONTROLLED TYPE 2 DIABETES MELLITUS WITH HYPERGLYCEMIA (HCC): ICD-10-CM

## 2021-11-24 DIAGNOSIS — E03.9 HYPOTHYROIDISM, UNSPECIFIED TYPE: ICD-10-CM

## 2021-11-24 DIAGNOSIS — J45.20 MILD INTERMITTENT REACTIVE AIRWAY DISEASE WITHOUT COMPLICATION: ICD-10-CM

## 2021-11-24 DIAGNOSIS — R05.9 COUGH: ICD-10-CM

## 2021-11-24 DIAGNOSIS — J98.8 RTI (RESPIRATORY TRACT INFECTION): ICD-10-CM

## 2021-11-24 RX ORDER — LEVOTHYROXINE SODIUM 0.12 MG/1
125 TABLET ORAL
Qty: 90 TABLET | Refills: 3 | Status: SHIPPED | OUTPATIENT
Start: 2021-11-24 | End: 2021-12-09 | Stop reason: SDUPTHER

## 2021-11-24 RX ORDER — SEMAGLUTIDE 1.34 MG/ML
1 INJECTION, SOLUTION SUBCUTANEOUS
Qty: 3 ML | Refills: 5 | Status: SHIPPED | OUTPATIENT
Start: 2021-11-24 | End: 2022-06-20

## 2021-11-24 RX ORDER — METFORMIN HYDROCHLORIDE 500 MG/1
1000 TABLET, EXTENDED RELEASE ORAL 2 TIMES DAILY
Qty: 360 TABLET | Refills: 0 | Status: SHIPPED | OUTPATIENT
Start: 2021-11-24 | End: 2022-06-23

## 2021-11-30 RX ORDER — METFORMIN HYDROCHLORIDE 500 MG/1
1000 TABLET, EXTENDED RELEASE ORAL 2 TIMES DAILY
Qty: 360 TABLET | Refills: 0 | Status: SHIPPED | OUTPATIENT
Start: 2021-11-30 | End: 2022-06-23

## 2021-11-30 RX ORDER — ALBUTEROL SULFATE 90 UG/1
2 AEROSOL, METERED RESPIRATORY (INHALATION) EVERY 6 HOURS PRN
Qty: 8.5 G | Refills: 3 | Status: SHIPPED | OUTPATIENT
Start: 2021-11-30

## 2021-11-30 RX ORDER — IPRATROPIUM BROMIDE AND ALBUTEROL SULFATE 2.5; .5 MG/3ML; MG/3ML
3 SOLUTION RESPIRATORY (INHALATION) 4 TIMES DAILY
Qty: 120 EACH | Refills: 3 | Status: SHIPPED | OUTPATIENT
Start: 2021-11-30

## 2021-12-09 DIAGNOSIS — E03.9 HYPOTHYROIDISM, UNSPECIFIED TYPE: ICD-10-CM

## 2021-12-09 RX ORDER — LEVOTHYROXINE SODIUM 0.12 MG/1
125 TABLET ORAL
Qty: 90 TABLET | Refills: 3 | Status: SHIPPED | OUTPATIENT
Start: 2021-12-09

## 2022-01-17 ENCOUNTER — APPOINTMENT (OUTPATIENT)
Dept: ENDOCRINOLOGY | Facility: MEDICAL CENTER | Age: 62
End: 2022-01-17
Attending: NURSE PRACTITIONER
Payer: COMMERCIAL

## 2022-06-20 DIAGNOSIS — E11.65 UNCONTROLLED TYPE 2 DIABETES MELLITUS WITH HYPERGLYCEMIA (HCC): ICD-10-CM

## 2022-06-20 RX ORDER — SEMAGLUTIDE 1.34 MG/ML
1 INJECTION, SOLUTION SUBCUTANEOUS
Qty: 9 ML | Refills: 0 | Status: SHIPPED | OUTPATIENT
Start: 2022-06-20

## 2022-06-23 ENCOUNTER — HOSPITAL ENCOUNTER (OUTPATIENT)
Facility: MEDICAL CENTER | Age: 62
End: 2022-06-23
Attending: NURSE PRACTITIONER
Payer: COMMERCIAL

## 2022-06-23 ENCOUNTER — OFFICE VISIT (OUTPATIENT)
Dept: URGENT CARE | Facility: PHYSICIAN GROUP | Age: 62
End: 2022-06-23
Payer: COMMERCIAL

## 2022-06-23 VITALS
HEIGHT: 67 IN | WEIGHT: 170 LBS | DIASTOLIC BLOOD PRESSURE: 68 MMHG | HEART RATE: 94 BPM | BODY MASS INDEX: 26.68 KG/M2 | OXYGEN SATURATION: 97 % | TEMPERATURE: 98.2 F | SYSTOLIC BLOOD PRESSURE: 110 MMHG | RESPIRATION RATE: 16 BRPM

## 2022-06-23 DIAGNOSIS — R30.9 PAINFUL URINATION: ICD-10-CM

## 2022-06-23 DIAGNOSIS — N30.00 ACUTE CYSTITIS WITHOUT HEMATURIA: ICD-10-CM

## 2022-06-23 LAB
APPEARANCE UR: ABNORMAL
BILIRUB UR STRIP-MCNC: NEGATIVE MG/DL
COLOR UR AUTO: ABNORMAL
GLUCOSE UR STRIP.AUTO-MCNC: 1000 MG/DL
KETONES UR STRIP.AUTO-MCNC: NEGATIVE MG/DL
LEUKOCYTE ESTERASE UR QL STRIP.AUTO: ABNORMAL
NITRITE UR QL STRIP.AUTO: POSITIVE
PH UR STRIP.AUTO: 5.5 [PH] (ref 5–8)
PROT UR QL STRIP: 30 MG/DL
RBC UR QL AUTO: ABNORMAL
SP GR UR STRIP.AUTO: 1
UROBILINOGEN UR STRIP-MCNC: 0.2 MG/DL

## 2022-06-23 PROCEDURE — 87186 SC STD MICRODIL/AGAR DIL: CPT

## 2022-06-23 PROCEDURE — 87077 CULTURE AEROBIC IDENTIFY: CPT

## 2022-06-23 PROCEDURE — 81002 URINALYSIS NONAUTO W/O SCOPE: CPT | Performed by: NURSE PRACTITIONER

## 2022-06-23 PROCEDURE — 99214 OFFICE O/P EST MOD 30 MIN: CPT | Performed by: NURSE PRACTITIONER

## 2022-06-23 PROCEDURE — 87086 URINE CULTURE/COLONY COUNT: CPT

## 2022-06-23 RX ORDER — CEFDINIR 300 MG/1
300 CAPSULE ORAL EVERY 12 HOURS
Qty: 14 CAPSULE | Refills: 0 | Status: SHIPPED | OUTPATIENT
Start: 2022-06-23 | End: 2022-06-30

## 2022-06-23 ASSESSMENT — ENCOUNTER SYMPTOMS
FLANK PAIN: 0
FEVER: 0
CHILLS: 0

## 2022-06-23 NOTE — PROGRESS NOTES
"Subjective     Gem Barnett is a 61 y.o. female who presents with Painful Urination (X 1 week)            Gem comes in today with a 1 week history of dysuria with urgency and frequency.  She notes intermittent suprapubic pain.  No fever, chills, myalgias, flank pain or nausea/vomiting.  She gets UTI occasionally. Last UTI 6 months ago, treated with Bactrim.  She is diabetic (type 2) and takes medication that causes sugar spilling in the urine. She reports her sugars are running a bit high this week, in the low 200s.         Review of Systems   Constitutional: Positive for malaise/fatigue. Negative for chills and fever.   Genitourinary: Positive for dysuria, frequency and urgency. Negative for flank pain and hematuria.     Medications, Allergies, and current problem list reviewed today in Epic         Objective     Blood Pressure 110/68   Pulse 94   Temperature 36.8 °C (98.2 °F) (Temporal)   Respiration 16   Height 1.702 m (5' 7\")   Weight 77.1 kg (170 lb)   Last Menstrual Period 06/30/2011   Oxygen Saturation 97%   Body Mass Index 26.63 kg/m²      Physical Exam  Vitals reviewed.   Constitutional:       General: She is not in acute distress.     Appearance: Normal appearance. She is well-developed. She is not ill-appearing, toxic-appearing or diaphoretic.   Cardiovascular:      Rate and Rhythm: Regular rhythm. Tachycardia present.      Heart sounds: Normal heart sounds. No murmur heard.    No friction rub. No gallop.   Pulmonary:      Effort: Pulmonary effort is normal. No respiratory distress.      Breath sounds: Normal breath sounds. No stridor. No wheezing, rhonchi or rales.   Chest:      Chest wall: No tenderness.   Abdominal:      General: Bowel sounds are normal. There is no distension or abdominal bruit.      Palpations: Abdomen is soft. Abdomen is not rigid. There is no shifting dullness, fluid wave or pulsatile mass.      Tenderness: There is no abdominal tenderness. There is no right CVA " tenderness, left CVA tenderness, guarding or rebound. Negative signs include Stanton's sign and McBurney's sign.   Skin:     General: Skin is warm and dry.      Coloration: Skin is not jaundiced or pale.      Findings: No rash.   Neurological:      Mental Status: She is alert and oriented to person, place, and time.             Contains abnormal data POCT Urinalysis  Order: [id number]   Status: Final result     Visible to patient: No (scheduled for 6/24/2022  2:04 PM)     Next appt: None     Dx: Painful urination     0 Result Notes    Component Ref Range & Units  4:02 PM 7 mo ago   POC Color Negative orange  brown    POC Appearance Negative cloudy  cloudy    POC Leukocyte Esterase Negative large  moderate    POC Nitrites Negative positive  negative    POC Urobiligen Negative (0.2) mg/dL 0.2  1.0    POC Protein Negative mg/dL 30  100    POC Urine PH 5.0 - 8.0 5.5  7.0    POC Blood Negative moderate  large    POC Specific Gravity <1.005 - >1.030 1.005  1.025    POC Ketones Negative mg/dL negative  trace    POC Bilirubin Negative mg/dL negative  small    POC Glucose Negative mg/dL 1,000  negative    Resulting Agency  [facility]              Specimen Collected: 06/23/22  4:02 PM Last Resulted: 06/23/22  4:03 PM                           Assessment & Plan        1. Acute cystitis without hematuria  - Urine Culture; Future  - cefdinir (OMNICEF) 300 MG Cap; Take 1 Capsule by mouth every 12 hours for 7 days.  Dispense: 14 Capsule; Refill: 0    2. Painful urination  - POCT Urinalysis    Discussed exam findings with Gem.  Differential reviewed.  Take full course of antibiotics.  Maintain adequate po hydration.  OTC azo prn urinary pain.  To ER if pain worsens, or if fever or vomiting develop.  RTC in 7 days if symptoms persist, sooner if worse.  She verbalized understanding of and agreed with plan of care.

## 2022-06-24 DIAGNOSIS — N30.00 ACUTE CYSTITIS WITHOUT HEMATURIA: ICD-10-CM

## 2022-06-27 LAB
BACTERIA UR CULT: ABNORMAL
BACTERIA UR CULT: ABNORMAL
SIGNIFICANT IND 70042: ABNORMAL
SITE SITE: ABNORMAL
SOURCE SOURCE: ABNORMAL

## 2022-07-08 ENCOUNTER — HOSPITAL ENCOUNTER (OUTPATIENT)
Facility: MEDICAL CENTER | Age: 62
End: 2022-07-08
Attending: NURSE PRACTITIONER
Payer: COMMERCIAL

## 2022-07-08 ENCOUNTER — OFFICE VISIT (OUTPATIENT)
Dept: URGENT CARE | Facility: PHYSICIAN GROUP | Age: 62
End: 2022-07-08
Payer: COMMERCIAL

## 2022-07-08 VITALS
RESPIRATION RATE: 16 BRPM | HEART RATE: 75 BPM | OXYGEN SATURATION: 98 % | BODY MASS INDEX: 26.21 KG/M2 | WEIGHT: 167 LBS | DIASTOLIC BLOOD PRESSURE: 68 MMHG | TEMPERATURE: 98.6 F | SYSTOLIC BLOOD PRESSURE: 108 MMHG | HEIGHT: 67 IN

## 2022-07-08 DIAGNOSIS — N30.01 ACUTE CYSTITIS WITH HEMATURIA: ICD-10-CM

## 2022-07-08 DIAGNOSIS — M25.532 LEFT WRIST PAIN: ICD-10-CM

## 2022-07-08 DIAGNOSIS — R30.9 PAINFUL URINATION: ICD-10-CM

## 2022-07-08 LAB
APPEARANCE UR: ABNORMAL
BILIRUB UR STRIP-MCNC: NEGATIVE MG/DL
COLOR UR AUTO: YELLOW
GLUCOSE UR STRIP.AUTO-MCNC: 1000 MG/DL
KETONES UR STRIP.AUTO-MCNC: NEGATIVE MG/DL
LEUKOCYTE ESTERASE UR QL STRIP.AUTO: ABNORMAL
NITRITE UR QL STRIP.AUTO: NEGATIVE
PH UR STRIP.AUTO: 6 [PH] (ref 5–8)
PROT UR QL STRIP: NEGATIVE MG/DL
RBC UR QL AUTO: ABNORMAL
SP GR UR STRIP.AUTO: 1.01
UROBILINOGEN UR STRIP-MCNC: 0.2 MG/DL

## 2022-07-08 PROCEDURE — 87086 URINE CULTURE/COLONY COUNT: CPT

## 2022-07-08 PROCEDURE — 99214 OFFICE O/P EST MOD 30 MIN: CPT | Performed by: NURSE PRACTITIONER

## 2022-07-08 PROCEDURE — 81002 URINALYSIS NONAUTO W/O SCOPE: CPT | Performed by: NURSE PRACTITIONER

## 2022-07-08 RX ORDER — NITROFURANTOIN 25; 75 MG/1; MG/1
100 CAPSULE ORAL 2 TIMES DAILY
Qty: 10 CAPSULE | Refills: 0 | Status: SHIPPED | OUTPATIENT
Start: 2022-07-08 | End: 2022-07-13

## 2022-07-08 ASSESSMENT — ENCOUNTER SYMPTOMS
MYALGIAS: 0
NAUSEA: 0
CHILLS: 0
VOMITING: 0
SHORTNESS OF BREATH: 0
FEVER: 0
DIZZINESS: 0
EYE PAIN: 0
FLANK PAIN: 0
SORE THROAT: 0

## 2022-07-08 NOTE — PROGRESS NOTES
Subjective:   Gem Barnett is a 61 y.o. female who presents for   Chief Complaint   Patient presents with   • UTI     Left wrist pain         HPI  Patient is a 61-year-old female who returns to the urgent care for evaluation of a UTI-like symptoms have never resolved.  Patient was seen and evaluated 6/23 diagnosed with UTI treated with cefdinir urine culture positive for E. coli.  Patient still feeling symptomatic with discomfort with urination.  Denies any flank pain, no fever and/or chills.  Denies nausea or vomiting.  Patient has also been experiencing left wrist pain for the past month after she hit her left wrist on the headboard while sleeping.  She has not had left wrist evaluated however continues to have pain on the lateral aspect.  She has been wearing a brace intermittently.  She denies any previous trauma or injury to the left wrist.  Review of Systems   Constitutional: Negative for chills and fever.   HENT: Negative for sore throat.    Eyes: Negative for pain.   Respiratory: Negative for shortness of breath.    Cardiovascular: Negative for chest pain.   Gastrointestinal: Negative for nausea and vomiting.   Genitourinary: Positive for dysuria, frequency and urgency. Negative for flank pain and hematuria.   Musculoskeletal: Positive for joint pain. Negative for myalgias.   Skin: Negative for rash.   Neurological: Negative for dizziness.       Medications:    • albuterol Aers  • Blood Glucose Test Strips  • escitalopram  • FreeStyle Corrine 2 Sensor Misc  • hydrocodone-acetaminophen Tabs  • Insulin Pen Needle 32 G x 4 mm  • ipratropium-albuterol  • Lancets  • levothyroxine Tabs  • MULTIVITAMINS PO  • nitrofurantoin Caps  • Ozempic (1 MG/DOSE) Sopn  • Tresiba FlexTouch Sopn    Allergies: Farxiga [dapagliflozin], Invokana [canagliflozin], and Jardiance [empagliflozin]    Problem List: Gem Barnett does not have any pertinent problems on file.    Surgical History:  No past surgical history on file.    Past  "Social Hx: Gem Barnett  reports that she has never smoked. She has never used smokeless tobacco. She reports current alcohol use of about 0.6 oz of alcohol per week. She reports that she does not use drugs.     Past Family Hx:  Gem Barnett family history includes Diabetes in her brother and mother; Hypertension in her brother.     Problem list, medications, and allergies reviewed by myself today in Epic.     Objective:     /68   Pulse 75   Temp 37 °C (98.6 °F) (Temporal)   Resp 16   Ht 1.702 m (5' 7\")   Wt 75.8 kg (167 lb)   LMP 06/30/2011   SpO2 98%   BMI 26.16 kg/m²     Physical Exam  Constitutional:       Appearance: Normal appearance. She is not ill-appearing or toxic-appearing.   HENT:      Head: Normocephalic.      Right Ear: External ear normal.      Left Ear: External ear normal.      Nose: Nose normal.      Mouth/Throat:      Lips: Pink.      Mouth: Mucous membranes are moist.   Eyes:      General: Lids are normal.         Right eye: No discharge.         Left eye: No discharge.   Pulmonary:      Effort: Pulmonary effort is normal. No accessory muscle usage or respiratory distress.   Abdominal:      General: Bowel sounds are normal.      Tenderness: There is no abdominal tenderness. There is no right CVA tenderness or left CVA tenderness.   Musculoskeletal:         General: Normal range of motion.      Left wrist: Tenderness (ulnar aspect) present. No swelling, bony tenderness, snuff box tenderness or crepitus. Normal range of motion. Normal pulse.      Cervical back: Full passive range of motion without pain.   Skin:     Coloration: Skin is not pale.   Neurological:      Mental Status: She is alert and oriented to person, place, and time.   Psychiatric:         Mood and Affect: Mood normal.         Thought Content: Thought content normal.         Assessment/Plan:     Diagnosis and associated orders:     1. Painful urination  POCT Urinalysis   2. Acute cystitis with hematuria  URINE " CULTURE(NEW)    nitrofurantoin (MACROBID) 100 MG Cap   3. Left wrist pain        Comments/MDM:     Results for orders placed or performed in visit on 07/08/22   POCT Urinalysis   Result Value Ref Range    POC Color yellow Negative    POC Appearance slightly cloudy Negative    POC Leukocyte Esterase small Negative    POC Nitrites negative Negative    POC Urobiligen 0.2 Negative (0.2) mg/dL    POC Protein negative Negative mg/dL    POC Urine PH 6.0 5.0 - 8.0    POC Blood trace Negative    POC Specific Gravity 1.010 <1.005 - >1.030    POC Ketones negative Negative mg/dL    POC Bilirubin negative Negative mg/dL    POC Glucose 1,000 Negative mg/dL •     I personally reviewed prior external notes and prior test results pertinent to today's visit.  Urine culture from 6/23 positive for E. coli sensitive to nitrofurantoin we will treat today with nitrofurantoin.  Patient does have point tenderness of the ulnar aspect had offered x-ray imaging however patient declining at this time.  Did recommend to continue resting icing, wearing brace as needed.  Follow-up with PCP for further evaluation and management and possible referral to specialist.  Discussed management options, risks and benefits, and alternatives to treatment plan agreed upon.   Red flags discussed and indications to immediately call 911 or present to the Emergency Department.   Supportive care, differential diagnoses, and indications for immediate follow-up discussed with patient.    • Patient expresses understanding and agrees to plan. Patient denies any other questions or concerns.   •          My total time spent caring for the patient on the day of the encounter was 30 minutes.   This does not include time spent on separately billable procedures/tests.    Please note that this dictation was created using voice recognition software. I have made a reasonable attempt to correct obvious errors, but I expect that there are errors of grammar and possibly content  that I did not discover before finalizing the note.    This note was electronically signed by Reed COTTRELL.

## 2022-07-11 LAB
BACTERIA UR CULT: NORMAL
SIGNIFICANT IND 70042: NORMAL
SITE SITE: NORMAL
SOURCE SOURCE: NORMAL

## 2022-09-14 ENCOUNTER — OFFICE VISIT (OUTPATIENT)
Dept: URGENT CARE | Facility: PHYSICIAN GROUP | Age: 62
End: 2022-09-14
Payer: COMMERCIAL

## 2022-09-14 ENCOUNTER — HOSPITAL ENCOUNTER (OUTPATIENT)
Facility: MEDICAL CENTER | Age: 62
End: 2022-09-14
Attending: NURSE PRACTITIONER
Payer: COMMERCIAL

## 2022-09-14 VITALS
BODY MASS INDEX: 25.9 KG/M2 | HEIGHT: 67 IN | DIASTOLIC BLOOD PRESSURE: 80 MMHG | OXYGEN SATURATION: 97 % | SYSTOLIC BLOOD PRESSURE: 118 MMHG | TEMPERATURE: 97.2 F | WEIGHT: 165 LBS | HEART RATE: 82 BPM | RESPIRATION RATE: 16 BRPM

## 2022-09-14 DIAGNOSIS — R30.0 DYSURIA: ICD-10-CM

## 2022-09-14 DIAGNOSIS — N30.01 ACUTE CYSTITIS WITH HEMATURIA: ICD-10-CM

## 2022-09-14 LAB
APPEARANCE UR: NORMAL
BILIRUB UR STRIP-MCNC: NORMAL MG/DL
COLOR UR AUTO: YELLOW
GLUCOSE UR STRIP.AUTO-MCNC: .=1 MG/DL
KETONES UR STRIP.AUTO-MCNC: NORMAL MG/DL
LEUKOCYTE ESTERASE UR QL STRIP.AUTO: NORMAL
NITRITE UR QL STRIP.AUTO: NORMAL
PH UR STRIP.AUTO: 6 [PH] (ref 5–8)
PROT UR QL STRIP: 100 MG/DL
RBC UR QL AUTO: NORMAL
SP GR UR STRIP.AUTO: 1.02
UROBILINOGEN UR STRIP-MCNC: 0.2 MG/DL

## 2022-09-14 PROCEDURE — 87086 URINE CULTURE/COLONY COUNT: CPT

## 2022-09-14 PROCEDURE — 87077 CULTURE AEROBIC IDENTIFY: CPT

## 2022-09-14 PROCEDURE — 99213 OFFICE O/P EST LOW 20 MIN: CPT | Performed by: NURSE PRACTITIONER

## 2022-09-14 PROCEDURE — 87186 SC STD MICRODIL/AGAR DIL: CPT

## 2022-09-14 PROCEDURE — 81002 URINALYSIS NONAUTO W/O SCOPE: CPT | Performed by: NURSE PRACTITIONER

## 2022-09-14 RX ORDER — CEFDINIR 300 MG/1
300 CAPSULE ORAL EVERY 12 HOURS
Qty: 10 CAPSULE | Refills: 0 | Status: SHIPPED | OUTPATIENT
Start: 2022-09-14 | End: 2022-09-19

## 2022-09-14 ASSESSMENT — ENCOUNTER SYMPTOMS
FEVER: 0
VOMITING: 0
NAUSEA: 0
CHILLS: 1
SORE THROAT: 0
EYE REDNESS: 0
DIZZINESS: 0
BACK PAIN: 1
FLANK PAIN: 0
MYALGIAS: 0
ABDOMINAL PAIN: 1
SHORTNESS OF BREATH: 0

## 2022-09-14 NOTE — PROGRESS NOTES
Subjective:   Gem Barnett is a 62 y.o. female who presents for Painful Urination (X1 day)      Dysuria   This is a new problem. The current episode started yesterday. The problem occurs every urination. The problem has been rapidly worsening. The quality of the pain is described as burning. The pain is at a severity of 6/10. The pain is moderate. There has been no fever. She is Not sexually active. Associated symptoms include chills, frequency and urgency. Pertinent negatives include no flank pain, hematuria, nausea or vomiting. She has tried acetaminophen for the symptoms. The treatment provided no relief. Her past medical history is significant for recurrent UTIs.     Review of Systems   Constitutional:  Positive for chills and malaise/fatigue. Negative for fever.   HENT:  Negative for sore throat.    Eyes:  Negative for redness.   Respiratory:  Negative for shortness of breath.    Cardiovascular:  Negative for chest pain.   Gastrointestinal:  Positive for abdominal pain. Negative for nausea and vomiting.   Genitourinary:  Positive for dysuria, frequency and urgency. Negative for flank pain and hematuria.   Musculoskeletal:  Positive for back pain. Negative for myalgias.   Skin:  Negative for rash.   Neurological:  Negative for dizziness.     Medications:    albuterol Aers  Blood Glucose Test Strips  cefdinir Caps  escitalopram  FreeStyle Corrine 2 Sensor Misc  hydrocodone-acetaminophen Tabs  Insulin Pen Needle 32 G x 4 mm  ipratropium-albuterol  Lancets  levothyroxine Tabs  MULTIVITAMINS PO  Ozempic (1 MG/DOSE) Sopn  Tresiba FlexTouch Sopn    Allergies: Farxiga [dapagliflozin], Invokana [canagliflozin], and Jardiance [empagliflozin]    Problem List: Gem Barnett does not have any pertinent problems on file.    Surgical History:  No past surgical history on file.    Past Social Hx: Gem Barnett  reports that she has never smoked. She has never used smokeless tobacco. She reports current alcohol use of about 0.6  "oz per week. She reports that she does not use drugs.     Past Family Hx:  Gem Barnett family history includes Diabetes in her brother and mother; Hypertension in her brother.     Problem list, medications, and allergies reviewed by myself today in Epic.     Objective:     /80   Pulse 82   Temp 36.2 °C (97.2 °F) (Temporal)   Resp 16   Ht 1.702 m (5' 7\")   Wt 74.8 kg (165 lb)   LMP 06/30/2011   SpO2 97%   BMI 25.84 kg/m²     Physical Exam  Constitutional:       Appearance: Normal appearance. She is not ill-appearing or toxic-appearing.   HENT:      Head: Normocephalic.      Right Ear: External ear normal.      Left Ear: External ear normal.      Nose: Nose normal.      Mouth/Throat:      Lips: Pink.      Mouth: Mucous membranes are moist.   Eyes:      General: Lids are normal.         Right eye: No discharge.         Left eye: No discharge.   Pulmonary:      Effort: Pulmonary effort is normal. No accessory muscle usage or respiratory distress.   Abdominal:      General: Bowel sounds are normal.      Tenderness: There is abdominal tenderness in the suprapubic area. There is no right CVA tenderness or left CVA tenderness.   Musculoskeletal:         General: Normal range of motion.      Cervical back: Full passive range of motion without pain.   Skin:     Coloration: Skin is not pale.   Neurological:      Mental Status: She is alert and oriented to person, place, and time.   Psychiatric:         Mood and Affect: Mood normal.         Thought Content: Thought content normal.       Assessment/Plan:     Diagnosis and associated orders:   1. Dysuria  POCT Urinalysis    Urine Culture    cefdinir (OMNICEF) 300 MG Cap      2. Acute cystitis with hematuria                 Comments/MDM:     Pt. Was given ABX therapy today and will change therapy if culture indicates this is necessary. ER precautions given- worsening symptoms, back pain, abd. Pain, or fevers.   Pt. Is to increase fluids, and take the complete " duration of the therapy.   Differential diagnosis, natural history, supportive care, and indications for immediate follow-up discussed.                  Please note that this dictation was created using voice recognition software. I have made a reasonable attempt to correct obvious errors, but I expect that there are errors of grammar and possibly content that I did not discover before finalizing the note.    This note was electronically signed by Reed COTTRELL.

## 2022-10-12 NOTE — TELEPHONE ENCOUNTER
Please call and schedule patient for an appointment with me.  After appointment is scheduled, I will prescribe enough to get her by until appt.

## 2022-10-12 NOTE — TELEPHONE ENCOUNTER
Received request via: Patient      Was the patient seen in the last year in this department? No    Does the patient have an active prescription (recently filled or refills available) for medication(s) requested? No      Last OV  07/09/21  Last labs 09/03/2018  Next OV NONE     Pt left VM stating she does not have anymore medication, advised pt has not been seen sive 7/21 and medication has been refused on 10/08/22. Pt asked if you would give her at least 5 days until she can make it in. Please advise

## 2022-10-13 RX ORDER — ESCITALOPRAM OXALATE 20 MG/1
TABLET ORAL
Qty: 90 TABLET | Refills: 0 | Status: SHIPPED | OUTPATIENT
Start: 2022-10-13 | End: 2022-11-23 | Stop reason: SDUPTHER

## 2022-10-19 ENCOUNTER — OFFICE VISIT (OUTPATIENT)
Dept: URGENT CARE | Facility: PHYSICIAN GROUP | Age: 62
End: 2022-10-19
Payer: COMMERCIAL

## 2022-10-19 ENCOUNTER — HOSPITAL ENCOUNTER (OUTPATIENT)
Facility: MEDICAL CENTER | Age: 62
End: 2022-10-19
Attending: FAMILY MEDICINE
Payer: COMMERCIAL

## 2022-10-19 VITALS
RESPIRATION RATE: 16 BRPM | OXYGEN SATURATION: 97 % | HEIGHT: 67 IN | DIASTOLIC BLOOD PRESSURE: 72 MMHG | TEMPERATURE: 97.9 F | HEART RATE: 93 BPM | BODY MASS INDEX: 25.9 KG/M2 | SYSTOLIC BLOOD PRESSURE: 114 MMHG | WEIGHT: 165 LBS

## 2022-10-19 DIAGNOSIS — R30.9 PAINFUL URINATION: ICD-10-CM

## 2022-10-19 DIAGNOSIS — N30.00 ACUTE CYSTITIS WITHOUT HEMATURIA: ICD-10-CM

## 2022-10-19 LAB
APPEARANCE UR: CLEAR
BILIRUB UR STRIP-MCNC: NEGATIVE MG/DL
COLOR UR AUTO: YELLOW
GLUCOSE UR STRIP.AUTO-MCNC: 1000 MG/DL
KETONES UR STRIP.AUTO-MCNC: NEGATIVE MG/DL
LEUKOCYTE ESTERASE UR QL STRIP.AUTO: NEGATIVE
NITRITE UR QL STRIP.AUTO: POSITIVE
PH UR STRIP.AUTO: 5.5 [PH] (ref 5–8)
PROT UR QL STRIP: NEGATIVE MG/DL
RBC UR QL AUTO: ABNORMAL
SP GR UR STRIP.AUTO: 1.01
UROBILINOGEN UR STRIP-MCNC: 0.2 MG/DL

## 2022-10-19 PROCEDURE — 87086 URINE CULTURE/COLONY COUNT: CPT

## 2022-10-19 PROCEDURE — 87077 CULTURE AEROBIC IDENTIFY: CPT

## 2022-10-19 PROCEDURE — 99213 OFFICE O/P EST LOW 20 MIN: CPT | Performed by: FAMILY MEDICINE

## 2022-10-19 PROCEDURE — 87186 SC STD MICRODIL/AGAR DIL: CPT

## 2022-10-19 PROCEDURE — 81002 URINALYSIS NONAUTO W/O SCOPE: CPT | Performed by: FAMILY MEDICINE

## 2022-10-19 RX ORDER — NITROFURANTOIN 25; 75 MG/1; MG/1
100 CAPSULE ORAL 2 TIMES DAILY
Qty: 10 CAPSULE | Refills: 0 | Status: SHIPPED
Start: 2022-10-19 | End: 2022-10-19 | Stop reason: CLARIF

## 2022-10-19 RX ORDER — NITROFURANTOIN 25; 75 MG/1; MG/1
100 CAPSULE ORAL 2 TIMES DAILY
Qty: 12 CAPSULE | Refills: 0 | Status: SHIPPED | OUTPATIENT
Start: 2022-10-19 | End: 2022-10-25

## 2022-10-19 NOTE — PROGRESS NOTES
Subjective:      CC:  presents with Dysuria            Dysuria   This is a new problem. The current episode started in the past 3 days. The problem occurs every urination. The problem has been unchanged. The quality of the pain is described as burning. There has been no fever. Pt is not sexually active. There is no history of pyelonephritis. Associated symptoms include frequency and urgency. Pertinent negatives include no chills, discharge, flank pain, nausea or vomiting. Pt has tried nothing for the symptoms. There is no history of recurrent UTIs.     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     Employer: [employer]     Comment: Special education   Tobacco Use    Smoking status: Never    Smokeless tobacco: Never   Substance and Sexual Activity    Alcohol use: Yes     Alcohol/week: 0.6 oz     Types: 1 Glasses of wine per week     Comment: glass of wine a few times a year    Drug use: No    Sexual activity: Not Currently   Other Topics Concern    Not on file   Social History Narrative    Not on file     Social Determinants of Health     Financial Resource Strain: Not on file   Food Insecurity: Not on file   Transportation Needs: Not on file   Physical Activity: Not on file   Stress: Not on file   Social Connections: Not on file   Intimate Partner Violence: Not on file   Housing Stability: Not on file         Family History   Problem Relation Age of Onset    Diabetes Mother     Diabetes Brother         type 1,     Hypertension Brother          Allergies   Allergen Reactions    Farxiga [Dapagliflozin] Itching     Complains of hives and itching    Invokana [Canagliflozin] Itching     Complains of hives and itching    Jardiance [Empagliflozin] Itching            Current Outpatient Medications on File Prior to Visit   Medication Sig Dispense Refill    escitalopram (LEXAPRO) 20 MG tablet Take 1 tablet by mouth once daily 90 Tablet 0    Continuous Blood Gluc Sensor (FREESTYLE LORENZO 2 SENSOR) Misc CHANGE AS DIRECTED EVERY 14 DAYS      Semaglutide, 1 MG/DOSE, (OZEMPIC, 1 MG/DOSE,) 4 MG/3ML Solution Pen-injector Inject 1 mg under the skin every 7 days. Please make an appointment for future refills 9 mL 0    levothyroxine (EUTHYROX) 125 MCG Tab Take 1 Tablet by mouth every morning on an empty stomach. 90 Tablet 3    ipratropium-albuterol (DUONEB) 0.5-2.5 (3) MG/3ML nebulizer solution Take 3 mL by nebulization 4 times a day. 120 Each 3    albuterol (PROAIR HFA) 108 (90 Base) MCG/ACT Aero Soln inhalation aerosol Inhale 2 Puffs every 6 hours as needed for Shortness of Breath. 8.5 g 3    Insulin Degludec (TRESIBA FLEXTOUCH) 200 UNIT/ML Solution Pen-injector Inject 80 Units under the skin every day. 72 mL 3    Blood Glucose Test Strips Test strips order: One Touch Verio Sig: use 3x/day and prn ssx high or low sugar 300 Strip 3    Lancets Lancets order:One Touch Delica Sig: use 3x/day and prn ssx high or low sugar. 300 Each 3    Insulin Pen Needle 32 G x 4 mm 1 Each by Does not apply route 3 times a day. 300 Each 3    hydrocodone-acetaminophen (VICODIN) 5-500 MG Tab Take 1-2 Tabs by mouth every four hours as needed.      Multiple Vitamin (MULTIVITAMINS PO) Take  by mouth.       No current facility-administered medications on file prior to visit.       Review of Systems   Constitutional: Negative for chills.   Respiratory: Negative for shortness of breath.    Cardiovascular: Negative for chest pain.   Gastrointestinal: Negative for nausea, vomiting and abdominal pain.   Genitourinary: Positive for dysuria, urgency and frequency. Negative for flank pain.   Skin: Negative for rash.   Neurological: Negative for dizziness and headaches.   All other systems reviewed and are  "negative.         Objective:      /72   Pulse 93   Temp 36.6 °C (97.9 °F) (Temporal)   Resp 16   Ht 1.702 m (5' 7\")   Wt 74.8 kg (165 lb)   SpO2 97%       Physical Exam   Constitutional: pt is oriented to person, place, and time. Pt appears well-developed and well-nourished. No distress.   HENT:   Head: Normocephalic and atraumatic.   Mouth/Throat: Mucous membranes are normal.   Eyes: Conjunctivae and EOM are normal. Pupils are equal, round, and reactive to light. Right eye exhibits no discharge. Left eye exhibits no discharge. No scleral icterus.   Neck: Normal range of motion. Neck supple.   Cardiovascular: Normal rate, regular rhythm, normal heart sounds and intact distal pulses.    No murmur heard.  Pulmonary/Chest: Effort normal and breath sounds normal. No respiratory distress. Pt has no wheezes,  rales.   Abdominal: Bowel sounds are normal. Pt exhibits no distension and no mass. There is no tenderness. There is no rebound, no guarding and no CVA tenderness.   Neurological: pt is alert and oriented to person, place, and time.   Skin: Skin is warm and dry.   Psychiatric: behavior is normal.   Nursing note and vitals reviewed.           Lab Results   Component Value Date/Time    POCCOLOR yellow 10/19/2022 03:56 PM    POCAPPEAR clear 10/19/2022 03:56 PM    POCLEUKEST negative 10/19/2022 03:56 PM    POCNITRITE positive 10/19/2022 03:56 PM    POCUROBILIGE 0.2 10/19/2022 03:56 PM    POCPROTEIN negative 10/19/2022 03:56 PM    POCURPH 5.5 10/19/2022 03:56 PM    POCBLOOD trace 10/19/2022 03:56 PM    POCSPGRV 1.010 10/19/2022 03:56 PM    POCKETONES negative 10/19/2022 03:56 PM    POCBILIRUBIN negative 10/19/2022 03:56 PM    POCGLUCUA 1,000 10/19/2022 03:56 PM            Assessment/Plan:     1.  Acute cystitis without hematuria   UA findings c/w infection    - nitrofurantoin (MACROBID) 100 MG Cap; Take 1 Capsule by mouth 2 times a day for 6 days.  Dispense: 12 Capsule; Refill: 0    Follow up in one week if no " improvement, sooner if symptoms worsen.

## 2022-10-20 DIAGNOSIS — N30.00 ACUTE CYSTITIS WITHOUT HEMATURIA: ICD-10-CM

## 2022-11-23 ENCOUNTER — OFFICE VISIT (OUTPATIENT)
Dept: MEDICAL GROUP | Facility: PHYSICIAN GROUP | Age: 62
End: 2022-11-23
Payer: COMMERCIAL

## 2022-11-23 VITALS
SYSTOLIC BLOOD PRESSURE: 124 MMHG | BODY MASS INDEX: 24.64 KG/M2 | TEMPERATURE: 97.1 F | WEIGHT: 157 LBS | HEIGHT: 67 IN | DIASTOLIC BLOOD PRESSURE: 60 MMHG | OXYGEN SATURATION: 97 % | RESPIRATION RATE: 16 BRPM | HEART RATE: 92 BPM

## 2022-11-23 DIAGNOSIS — J45.40 MODERATE PERSISTENT REACTIVE AIRWAY DISEASE WITHOUT COMPLICATION: ICD-10-CM

## 2022-11-23 DIAGNOSIS — M79.671 RIGHT FOOT PAIN: ICD-10-CM

## 2022-11-23 DIAGNOSIS — F33.1 MODERATE EPISODE OF RECURRENT MAJOR DEPRESSIVE DISORDER (HCC): ICD-10-CM

## 2022-11-23 DIAGNOSIS — M25.532 LEFT WRIST PAIN: ICD-10-CM

## 2022-11-23 DIAGNOSIS — E11.9 TYPE 2 DIABETES MELLITUS WITHOUT COMPLICATION, WITHOUT LONG-TERM CURRENT USE OF INSULIN (HCC): ICD-10-CM

## 2022-11-23 PROCEDURE — 99214 OFFICE O/P EST MOD 30 MIN: CPT | Performed by: NURSE PRACTITIONER

## 2022-11-23 RX ORDER — MONTELUKAST SODIUM 10 MG/1
10 TABLET ORAL DAILY
Qty: 30 TABLET | Refills: 5 | Status: SHIPPED | OUTPATIENT
Start: 2022-11-23 | End: 2023-03-28 | Stop reason: SDUPTHER

## 2022-11-23 RX ORDER — ESCITALOPRAM OXALATE 20 MG/1
TABLET ORAL
Qty: 90 TABLET | Refills: 3 | Status: SHIPPED | OUTPATIENT
Start: 2022-11-23 | End: 2023-11-15 | Stop reason: SDUPTHER

## 2022-11-23 ASSESSMENT — PATIENT HEALTH QUESTIONNAIRE - PHQ9: CLINICAL INTERPRETATION OF PHQ2 SCORE: 0

## 2022-11-24 NOTE — ASSESSMENT & PLAN NOTE
Patient is 62-year-old female here to establish care with me today.  Has long history of type 2 diabetes.  Managed by endocrinology at [facility].  [facility] management includes Ozempic and Tresiba.  Has appointment with endocrinology next month and will have labs shortly before that.

## 2022-11-24 NOTE — ASSESSMENT & PLAN NOTE
Patient is 62-year-old female here to establish care with me today.  Patient reports 6-month onset of left lateral wrist pain.  Woke up 1 morning with it hurting.  Seems to be getting worse.  Pain is constant.  Relieved somewhat with wrist brace.  Has tried frankincense, Bengay, NSAIDs, Tylenol.  Denies weakness or numbness and tingling.

## 2022-11-24 NOTE — PROGRESS NOTES
CC: Establish care, wrist, foot    HISTORY OF THE PRESENT ILLNESS: Patient is a 62 y.o. female. This pleasant patient is here today to establish care and for evaluation and management of the following health problems.    His previous primary care provider is Dr. Zurita.        Type 2 diabetes mellitus without complication, without long-term current use of insulin (HCC)  Patient is 62-year-old female here to establish care with me today.  Has long history of type 2 diabetes.  Managed by endocrinology at [facility].  [facility] management includes Ozempic and Tresiba.  Has appointment with endocrinology next month and will have labs shortly before that.      Recurrent major depressive disorder (HCC)  Patient is 62-year-old female here to establish care with me today.  This is a chronic health problem that is well controlled with current medications and lifestyle measures.  Taking escitalopram once a day. Taking for years.  Venlafaxine caused severe nausea vomitting.  No supportive family and friends.  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    Left wrist pain  Patient is 62-year-old female here to establish care with me today.  Patient reports 6-month onset of left lateral wrist pain.  Woke up 1 morning with it hurting.  Seems to be getting worse.  Pain is constant.  Relieved somewhat with wrist brace.  Has tried frankincense, Bengay, NSAIDs, Tylenol.  Denies weakness or numbness and tingling.    Right foot pain  Patient is 62-year-old female here to establish care with me today.  Reports 2 month onset of tenderness dorsal aspect, feels a lump getting bigger.  Denies erythema, numbness, tingling.    Reactive airway disease  Patient reports since viral illness 8 weeks ago, has had wheezing and dry cough.  Albuterol inhaler helps.  Seems to need to use it once or twice a day.    Allergies: Farxiga [dapagliflozin], Invokana [canagliflozin], and Jardiance [empagliflozin]    Current Outpatient Medications  Ordered in [facility]   Medication Sig Dispense Refill    montelukast (SINGULAIR) 10 MG Tab Take 1 Tablet by mouth every day. 30 Tablet 5    escitalopram (LEXAPRO) 20 MG tablet Take 1 tablet by mouth once daily 90 Tablet 3    Continuous Blood Gluc Sensor (FREESTYLE LORENZO 2 SENSOR) Misc CHANGE AS DIRECTED EVERY 14 DAYS      Semaglutide, 1 MG/DOSE, (OZEMPIC, 1 MG/DOSE,) 4 MG/3ML Solution Pen-injector Inject 1 mg under the skin every 7 days. Please make an appointment for future refills (Patient taking differently: Inject 2 mg under the skin every 7 days. Please make an appointment for future refills) 9 mL 0    levothyroxine (EUTHYROX) 125 MCG Tab Take 1 Tablet by mouth every morning on an empty stomach. 90 Tablet 3    ipratropium-albuterol (DUONEB) 0.5-2.5 (3) MG/3ML nebulizer solution Take 3 mL by nebulization 4 times a day. 120 Each 3    albuterol (PROAIR HFA) 108 (90 Base) MCG/ACT Aero Soln inhalation aerosol Inhale 2 Puffs every 6 hours as needed for Shortness of Breath. 8.5 g 3    Insulin Degludec (TRESIBA FLEXTOUCH) 200 UNIT/ML Solution Pen-injector Inject 80 Units under the skin every day. 72 mL 3    Blood Glucose Test Strips Test strips order: One Touch Verio Sig: use 3x/day and prn ssx high or low sugar 300 Strip 3    Lancets Lancets order:One Touch Delica Sig: use 3x/day and prn ssx high or low sugar. 300 Each 3    Insulin Pen Needle 32 G x 4 mm 1 Each by Does not apply route 3 times a day. 300 Each 3    hydrocodone-acetaminophen (VICODIN) 5-500 MG Tab Take 1-2 Tabs by mouth every four hours as needed.      Multiple Vitamin (MULTIVITAMINS PO) Take  by mouth.       No current Epic-ordered facility-administered medications on file.       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    Social History     Tobacco Use    Smoking status: Never    Smokeless tobacco: Never   Vaping Use    Vaping Use: Never used   Substance Use Topics    Alcohol use: Yes     Alcohol/week: 0.6 oz     Types: 1 Glasses of wine per week     Comment: glass of wine a few times a year    Drug use: No       Family History   Problem Relation Age of Onset    Diabetes Mother     Diabetes Brother         type 1,     Hypertension Brother        ROS: As in HPI, otherwise negative for chest pain, dyspnea, abdominal pain, dysuria, blood in stool, fever         Exam: /60 (BP Location: Right arm)   Pulse 92   Temp 36.2 °C (97.1 °F) (Temporal)   Resp 16   Ht 1.702 m (5' 7\")   Wt 71.2 kg (157 lb)   SpO2 97%  Body mass index is 24.59 kg/m².    General: Alert, pleasant, well nourished, well developed female in NAD  HEENT: Normocephalic. Eyes conjunctiva clear lids without ptosis, pupils equal and reactive to light, ears normal shape and contour, canals are clear bilaterally, tympanic membranes are pearly gray with good light reflex, nasal mucosa without erythema and drainage, oropharynx is without erythema, edema or exudates.   Neck: Supple without bruit. Thyroid is not enlarged.  Pulmonary: Clear to ausculation.  Normal effort. No rales, ronchi, or wheezing.  Cardiovascular: Normal rate and rhythm without murmur. Carotid and radial pulses are intact and equal bilaterally.  No lower extremity edema.  Abdomen: Soft, nontender, nondistended. Normal bowel sounds. Liver and spleen are not palpable  Neurologic: Grossly nonfocal  Lymph: No cervical or supraclavicular lymph nodes are palpable  Skin: Warm and dry.   Left wrist: No erythema, localized edema lateral aspect styloid process, full active range of motion, mild tenderness to palpation  Right foot: No erythema, hard bony prominence dorsal aspect midfoot, tender to palpation.  Musculoskeletal: Normal gait. "   Psych: Normal mood and affect. Alert and oriented. Judgment and insight is normal.    Please note that this dictation was created using voice recognition software. I have made every reasonable attempt to correct obvious errors, but I expect that there are errors of grammar and possibly content that I did not discover before finalizing the note.      Assessment/Plan  1. Moderate episode of recurrent major depressive disorder (HCC)  Well controlled.  Continue with escitalopram, not changes.  Advised on routine aerobic exercise.  - escitalopram (LEXAPRO) 20 MG tablet; Take 1 tablet by mouth once daily  Dispense: 90 Tablet; Refill: 3    2. Left wrist pain  Reviewed differentials with patient including arthritis, tendon tear, cyst.  Will refer to hand surgery and defer imaging to them.  Patient agreeable to plan.  - Referral to Hand Surgery    3. Right foot pain  Will get x-ray and refer to podiatry.  - DX-FOOT-COMPLETE 3+ RIGHT; Future  - Referral to Podiatry    4. Moderate persistent reactive airway disease without complication  Will start montelukast.  Explained implications, instructions, side effects.  Continue with albuterol as needed.  - montelukast (SINGULAIR) 10 MG Tab; Take 1 Tablet by mouth every day.  Dispense: 30 Tablet; Refill: 5    5. Type 2 diabetes mellitus without complication, without long-term current use of insulin (HCC)  Managed by endocrinology.  We will request records.    Patient will return to clinic in 6 months or sooner if needed.

## 2022-11-24 NOTE — ASSESSMENT & PLAN NOTE
Patient is 62-year-old female here to establish care with me today.  This is a chronic health problem that is well controlled with current medications and lifestyle measures.  Taking escitalopram once a day. Taking for years.  Venlafaxine caused severe nausea vomitting.  No supportive family and friends.  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

## 2022-11-24 NOTE — ASSESSMENT & PLAN NOTE
Patient reports since viral illness 8 weeks ago, has had wheezing and dry cough.  Albuterol inhaler helps.  Seems to need to use it once or twice a day.

## 2022-11-24 NOTE — ASSESSMENT & PLAN NOTE
Patient is 62-year-old female here to establish care with me today.  Reports 2 month onset of tenderness dorsal aspect, feels a lump getting bigger.  Denies erythema, numbness, tingling.

## 2022-12-17 ENCOUNTER — OFFICE VISIT (OUTPATIENT)
Dept: URGENT CARE | Facility: PHYSICIAN GROUP | Age: 62
End: 2022-12-17
Payer: COMMERCIAL

## 2022-12-17 VITALS
SYSTOLIC BLOOD PRESSURE: 106 MMHG | DIASTOLIC BLOOD PRESSURE: 68 MMHG | RESPIRATION RATE: 16 BRPM | HEART RATE: 79 BPM | TEMPERATURE: 98 F | HEIGHT: 67 IN | OXYGEN SATURATION: 98 % | WEIGHT: 158 LBS | BODY MASS INDEX: 24.8 KG/M2

## 2022-12-17 DIAGNOSIS — R30.9 PAINFUL URINATION: ICD-10-CM

## 2022-12-17 DIAGNOSIS — N39.0 RECURRENT UTI: ICD-10-CM

## 2022-12-17 LAB
APPEARANCE UR: CLEAR
BILIRUB UR STRIP-MCNC: NEGATIVE MG/DL
COLOR UR AUTO: ABNORMAL
GLUCOSE UR STRIP.AUTO-MCNC: 1000 MG/DL
KETONES UR STRIP.AUTO-MCNC: NEGATIVE MG/DL
LEUKOCYTE ESTERASE UR QL STRIP.AUTO: ABNORMAL
NITRITE UR QL STRIP.AUTO: POSITIVE
PH UR STRIP.AUTO: 5 [PH] (ref 5–8)
PROT UR QL STRIP: NEGATIVE MG/DL
RBC UR QL AUTO: ABNORMAL
SP GR UR STRIP.AUTO: 1.01
UROBILINOGEN UR STRIP-MCNC: 1 MG/DL

## 2022-12-17 PROCEDURE — 99214 OFFICE O/P EST MOD 30 MIN: CPT | Performed by: FAMILY MEDICINE

## 2022-12-17 PROCEDURE — 81002 URINALYSIS NONAUTO W/O SCOPE: CPT | Performed by: FAMILY MEDICINE

## 2022-12-17 RX ORDER — CEFDINIR 300 MG/1
300 CAPSULE ORAL 2 TIMES DAILY
Qty: 14 CAPSULE | Refills: 0 | Status: SHIPPED | OUTPATIENT
Start: 2022-12-17 | End: 2022-12-24

## 2022-12-17 RX ORDER — PHENAZOPYRIDINE HYDROCHLORIDE 200 MG/1
200 TABLET, FILM COATED ORAL 3 TIMES DAILY PRN
Qty: 6 TABLET | Refills: 0 | Status: SHIPPED | OUTPATIENT
Start: 2022-12-17

## 2022-12-23 ASSESSMENT — ENCOUNTER SYMPTOMS
NAUSEA: 0
EYE DISCHARGE: 0
MYALGIAS: 0
WEIGHT LOSS: 0
EYE REDNESS: 0
VOMITING: 0

## 2022-12-24 NOTE — PROGRESS NOTES
"Subjective     Gem Barnett is a 62 y.o. female who presents with Painful Urination            2 days urinary burning urgency and frequency.  No fever.  No flank pain.  PMH recurrent UTI.  No PMH pyelonephritis.  No hematuria.  No other aggravating or alleviating factors.      Review of Systems   Constitutional:  Negative for malaise/fatigue and weight loss.   Eyes:  Negative for discharge and redness.   Gastrointestinal:  Negative for nausea and vomiting.   Musculoskeletal:  Negative for joint pain and myalgias.   Skin:  Negative for itching and rash.            Objective     /68   Pulse 79   Temp 36.7 °C (98 °F) (Temporal)   Resp 16   Ht 1.702 m (5' 7\")   Wt 71.7 kg (158 lb)   LMP 06/30/2011   SpO2 98%   BMI 24.75 kg/m²      Physical Exam  Constitutional:       General: She is not in acute distress.     Appearance: She is well-developed.   HENT:      Head: Normocephalic and atraumatic.   Eyes:      Conjunctiva/sclera: Conjunctivae normal.   Cardiovascular:      Rate and Rhythm: Normal rate and regular rhythm.      Heart sounds: Normal heart sounds. No murmur heard.  Pulmonary:      Effort: Pulmonary effort is normal.      Breath sounds: Normal breath sounds. No wheezing.   Abdominal:      Palpations: Abdomen is soft.      Tenderness: There is no abdominal tenderness. There is no right CVA tenderness or left CVA tenderness.   Skin:     General: Skin is warm and dry.      Findings: No rash.   Neurological:      Mental Status: She is alert.                           Assessment & Plan   UA reviewed  Urine culture 10/20/2022 reviewed  Urine culture 9/14/2022 reviewed    Urgent care visit 10/19/2022 reviewed  Urgent care visit 9/14/2022 reviewed     1. Painful urination    - POCT Urinalysis  - phenazopyridine (PYRIDIUM) 200 MG Tab; Take 1 Tablet by mouth 3 times a day as needed (urinary pain).  Dispense: 6 Tablet; Refill: 0    2. Recurrent UTI    - cefdinir (OMNICEF) 300 MG Cap; Take 1 Capsule by mouth 2 " times a day for 7 days.  Dispense: 14 Capsule; Refill: 0  - phenazopyridine (PYRIDIUM) 200 MG Tab; Take 1 Tablet by mouth 3 times a day as needed (urinary pain).  Dispense: 6 Tablet; Refill: 0     Differential diagnosis, natural history, supportive care, and indications for immediate follow-up discussed at length.     Push fluids

## 2023-03-28 DIAGNOSIS — J45.40 MODERATE PERSISTENT REACTIVE AIRWAY DISEASE WITHOUT COMPLICATION: ICD-10-CM

## 2023-03-28 NOTE — TELEPHONE ENCOUNTER
Insurance requests 90 day supply.    Last ov 11/23/22    Received request via: Pharmacy    Was the patient seen in the last year in this department? Yes    Does the patient have an active prescription (recently filled or refills available) for medication(s) requested? No    Does the patient have care home Plus and need 100 day supply (blood pressure, diabetes and cholesterol meds only)? Patient does not have SCP

## 2023-03-29 RX ORDER — MONTELUKAST SODIUM 10 MG/1
10 TABLET ORAL DAILY
Qty: 90 TABLET | Refills: 3 | Status: SHIPPED | OUTPATIENT
Start: 2023-03-29

## 2023-05-19 ENCOUNTER — OFFICE VISIT (OUTPATIENT)
Dept: MEDICAL GROUP | Facility: PHYSICIAN GROUP | Age: 63
End: 2023-05-19
Payer: COMMERCIAL

## 2023-05-19 VITALS
WEIGHT: 155.3 LBS | HEART RATE: 82 BPM | RESPIRATION RATE: 18 BRPM | SYSTOLIC BLOOD PRESSURE: 100 MMHG | BODY MASS INDEX: 24.37 KG/M2 | TEMPERATURE: 97.1 F | HEIGHT: 67 IN | OXYGEN SATURATION: 97 % | DIASTOLIC BLOOD PRESSURE: 60 MMHG

## 2023-05-19 DIAGNOSIS — Z12.12 SCREENING FOR COLORECTAL CANCER: ICD-10-CM

## 2023-05-19 DIAGNOSIS — Z12.11 SCREENING FOR COLORECTAL CANCER: ICD-10-CM

## 2023-05-19 DIAGNOSIS — Z12.31 ENCOUNTER FOR SCREENING MAMMOGRAM FOR BREAST CANCER: ICD-10-CM

## 2023-05-19 DIAGNOSIS — M25.532 LEFT WRIST PAIN: ICD-10-CM

## 2023-05-19 DIAGNOSIS — F33.1 MODERATE EPISODE OF RECURRENT MAJOR DEPRESSIVE DISORDER (HCC): ICD-10-CM

## 2023-05-19 DIAGNOSIS — Z23 NEED FOR VACCINATION FOR PNEUMOCOCCUS: ICD-10-CM

## 2023-05-19 DIAGNOSIS — E11.9 TYPE 2 DIABETES MELLITUS WITHOUT COMPLICATION, WITHOUT LONG-TERM CURRENT USE OF INSULIN (HCC): ICD-10-CM

## 2023-05-19 DIAGNOSIS — J45.20 MILD INTERMITTENT REACTIVE AIRWAY DISEASE WITHOUT COMPLICATION: ICD-10-CM

## 2023-05-19 PROCEDURE — 90677 PCV20 VACCINE IM: CPT | Performed by: NURSE PRACTITIONER

## 2023-05-19 PROCEDURE — 3078F DIAST BP <80 MM HG: CPT | Performed by: NURSE PRACTITIONER

## 2023-05-19 PROCEDURE — 3074F SYST BP LT 130 MM HG: CPT | Performed by: NURSE PRACTITIONER

## 2023-05-19 PROCEDURE — 99214 OFFICE O/P EST MOD 30 MIN: CPT | Mod: 25 | Performed by: NURSE PRACTITIONER

## 2023-05-19 PROCEDURE — 92250 FUNDUS PHOTOGRAPHY W/I&R: CPT | Mod: TC | Performed by: NURSE PRACTITIONER

## 2023-05-19 PROCEDURE — 90471 IMMUNIZATION ADMIN: CPT | Performed by: NURSE PRACTITIONER

## 2023-05-19 RX ORDER — ERTUGLIFLOZIN AND METFORMIN HYDROCHLORIDE 2.5; 5 MG/1; MG/1
TABLET, FILM COATED ORAL DAILY
COMMUNITY
Start: 2023-05-18

## 2023-05-19 RX ORDER — GLIMEPIRIDE 2 MG/1
TABLET ORAL
COMMUNITY
Start: 2023-04-27

## 2023-05-19 ASSESSMENT — PATIENT HEALTH QUESTIONNAIRE - PHQ9
9. THOUGHTS THAT YOU WOULD BE BETTER OFF DEAD, OR OF HURTING YOURSELF: NOT AT ALL
SUM OF ALL RESPONSES TO PHQ9 QUESTIONS 1 AND 2: 4
SUM OF ALL RESPONSES TO PHQ QUESTIONS 1-9: 8
5. POOR APPETITE OR OVEREATING: NOT AT ALL
7. TROUBLE CONCENTRATING ON THINGS, SUCH AS READING THE NEWSPAPER OR WATCHING TELEVISION: NOT AT ALL
8. MOVING OR SPEAKING SO SLOWLY THAT OTHER PEOPLE COULD HAVE NOTICED. OR THE OPPOSITE, BEING SO FIGETY OR RESTLESS THAT YOU HAVE BEEN MOVING AROUND A LOT MORE THAN USUAL: SEVERAL DAYS
3. TROUBLE FALLING OR STAYING ASLEEP OR SLEEPING TOO MUCH: NOT AT ALL
6. FEELING BAD ABOUT YOURSELF - OR THAT YOU ARE A FAILURE OR HAVE LET YOURSELF OR YOUR FAMILY DOWN: NOT AL ALL
1. LITTLE INTEREST OR PLEASURE IN DOING THINGS: MORE THAN HALF THE DAYS
2. FEELING DOWN, DEPRESSED, IRRITABLE, OR HOPELESS: MORE THAN HALF THE DAYS
4. FEELING TIRED OR HAVING LITTLE ENERGY: NEARLY EVERY DAY

## 2023-05-19 NOTE — ASSESSMENT & PLAN NOTE
Chronic health problem, managed by endocrinology at [facility].  Patient reports last A1c was 8%.  Obtained urine and foot exam.  We will request records.  Ozempic was increased to 2 mg weekly in the last couple months.  Also taking Segluromet and Tresiba.  Denies concerns.
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
## 2023-05-19 NOTE — ASSESSMENT & PLAN NOTE
This is a chronic health problem that is well controlled with current medications and lifestyle measures.  Taking escitalopram 10 mg once a day.  Has supportive family and friends.  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  Walking for exercise.  Denies SI/HI.

## 2023-05-19 NOTE — ASSESSMENT & PLAN NOTE
Chronic health problem, improved control with Singulair daily.  Rarely needs to use albuterol inhaler.

## 2023-05-25 LAB — RETINAL SCREEN: NEGATIVE

## 2023-11-15 ENCOUNTER — OFFICE VISIT (OUTPATIENT)
Dept: URGENT CARE | Facility: PHYSICIAN GROUP | Age: 63
End: 2023-11-15
Payer: COMMERCIAL

## 2023-11-15 VITALS
TEMPERATURE: 97.5 F | WEIGHT: 155 LBS | SYSTOLIC BLOOD PRESSURE: 120 MMHG | HEIGHT: 67 IN | BODY MASS INDEX: 24.33 KG/M2 | OXYGEN SATURATION: 97 % | DIASTOLIC BLOOD PRESSURE: 70 MMHG | HEART RATE: 78 BPM | RESPIRATION RATE: 16 BRPM

## 2023-11-15 DIAGNOSIS — H44.001 EYE INFECTION, RIGHT: ICD-10-CM

## 2023-11-15 DIAGNOSIS — F33.1 MODERATE EPISODE OF RECURRENT MAJOR DEPRESSIVE DISORDER (HCC): ICD-10-CM

## 2023-11-15 PROCEDURE — 3074F SYST BP LT 130 MM HG: CPT | Performed by: NURSE PRACTITIONER

## 2023-11-15 PROCEDURE — 3078F DIAST BP <80 MM HG: CPT | Performed by: NURSE PRACTITIONER

## 2023-11-15 PROCEDURE — 99213 OFFICE O/P EST LOW 20 MIN: CPT | Performed by: NURSE PRACTITIONER

## 2023-11-15 RX ORDER — ESCITALOPRAM OXALATE 20 MG/1
TABLET ORAL
Qty: 90 TABLET | Refills: 0 | Status: SHIPPED | OUTPATIENT
Start: 2023-11-15

## 2023-11-15 RX ORDER — TOBRAMYCIN 3 MG/ML
1 SOLUTION/ DROPS OPHTHALMIC EVERY 4 HOURS
Qty: 3 ML | Refills: 0 | Status: SHIPPED | OUTPATIENT
Start: 2023-11-15 | End: 2023-11-25

## 2023-11-15 ASSESSMENT — VISUAL ACUITY: OU: 1

## 2023-12-07 ENCOUNTER — OCCUPATIONAL MEDICINE (OUTPATIENT)
Dept: URGENT CARE | Facility: PHYSICIAN GROUP | Age: 63
End: 2023-12-07
Payer: COMMERCIAL

## 2023-12-07 VITALS
HEART RATE: 97 BPM | BODY MASS INDEX: 23.54 KG/M2 | HEIGHT: 67 IN | OXYGEN SATURATION: 96 % | WEIGHT: 150 LBS | TEMPERATURE: 97.6 F | RESPIRATION RATE: 14 BRPM | DIASTOLIC BLOOD PRESSURE: 62 MMHG | SYSTOLIC BLOOD PRESSURE: 108 MMHG

## 2023-12-07 DIAGNOSIS — Z02.6 ENCOUNTER RELATED TO WORKER'S COMPENSATION CLAIM: ICD-10-CM

## 2023-12-07 DIAGNOSIS — M25.531 ACUTE PAIN OF RIGHT WRIST: ICD-10-CM

## 2023-12-07 DIAGNOSIS — S60.211A CONTUSION OF RIGHT WRIST, INITIAL ENCOUNTER: ICD-10-CM

## 2023-12-07 PROCEDURE — 3074F SYST BP LT 130 MM HG: CPT | Performed by: NURSE PRACTITIONER

## 2023-12-07 PROCEDURE — 99213 OFFICE O/P EST LOW 20 MIN: CPT | Performed by: NURSE PRACTITIONER

## 2023-12-07 PROCEDURE — 3078F DIAST BP <80 MM HG: CPT | Performed by: NURSE PRACTITIONER
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
## 2023-12-07 NOTE — LETTER
"    EMPLOYEE’S CLAIM FOR COMPENSATION/ REPORT OF INITIAL TREATMENT  FORM C-4  PLEASE TYPE OR PRINT    EMPLOYEE’S CLAIM - PROVIDE ALL INFORMATION REQUESTED   First Name                    ARTUR Rabago Last Name  Anibal Birthdate                    1960                Sex  []M  []F Claim Number (Insurer’s Use Only)     Home Address  [address] Age  63 y.o. 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  Telephone  [phone]    Office Mail Address (Number and Street)  [address]     Date of Injury (if applicable) 12/6/2023               Hours Injury (if applicable)            am               pm Date Employer Notified  12/7/2023 Last Day of Work after Injury or Occupational Disease  12/7/2023 Supervisor to Whom Injury     Reported  [place]   Address or Location of Accident (if applicable)  Work [1]   What were you doing at the time of accident? (if applicable)      How did this injury or occupational disease occur? (Be specific and answer in detail. Use additional sheet if necessary)     If you believe that you have an occupational disease, when did you first have knowledge of the disability and its relationship to your employment?  n/a Witnesses to the Accident (if applicable)  n/a      Nature of Injury or Occupational Disease    Part(s) of Body Injured or Affected  Wrist (R) and Hand (R) Lower Arm (R) N/A    I CERTIFY THAT THE ABOVE IS TRUE AND CORRECT TO T HE BEST OF MY KNOWLEDGE AND THAT I HAVE PROVIDED THIS " INFORMATION IN ORDER TO OBTAIN THE BENEFITS OF [place], OR [place]).  I HEREBY AUTHORIZE ANY PHYSICIAN, CHIROPRACTOR, SURGEON, PRACTITIONER OR ANY OTHER PERSON, ANY HOSPITAL, INCLUDING [facility] OR [facility], ANY  MEDICAL SERVICE ORGANIZATION, ANY INSURANCE COMPANY, OR OTHER INSTITUTION OR ORGANIZATION TO RELEASE TO EACH OTHER, ANY MEDICAL OR OTHER INFORMATION, INCLUDING BENEFITS PAID OR PAYABLE, PERTINENT TO THIS INJURY OR DISEASE, EXCEPT INFORMATION RELATIVE TO DIAGNOSIS, TREATMENT AND/OR COUNSELING FOR AIDS, PSYCHOLOGICAL CONDITIONS, ALCOHOL OR CONTROLLED SUBSTANCES, FOR WHICH I MUST GIVE SPECIFIC AUTHORIZATION.  A PHOTOSTAT OF THIS AUTHORIZATION SHALL BE VALID AS THE ORIGINAL.     Date   Place Employee’s Original or  *Electronic Signature   THIS REPORT MUST BE COMPLETED AND MAILED WITHIN 3 WORKING DAYS OF TREATMENT   Place  [facility]    Name of Facility  [place]   Date 12/7/2023 Diagnosis and Description of Injury or Occupational Disease  (M25.531) Acute pain of right wrist  The encounter diagnosis was Acute pain of right wrist. Is there evidence that the injured employee was under the influence of alcohol and/or another controlled substance at the time of accident?  []No  [] Yes (if yes, please explain)   Hour 1:31 PM  No   Treatment: No use of right arm.  Must wear thumb spica splint 24/7, may remove during showering or bathing.  Take over-the-counter Tylenol Motrin as needed to help with pain and inflammation.  Recommend cryotherapy 20 minutes 2-3 times daily.  X-ray order placed  Follow-up in 1 week.    Have you advised the patient to remain off work five days or more?   [] Yes Indicate dates: From   To    []No If no, is the injured employee capable of: [] full duty [] modified duty                                                             No  Yes  If modified duty, specify  any limitations / restrictions:  No use of right arm                                                                                                                                                                                                                                                                                                                                                                                                               X-Ray Findings:   Comments:Pending    From information given by the employee, together with medical evidence, can you directly connect this injury or occupational disease as job incurred?  []Yes   [] No Yes    Is additional medical care by a physician indicated? []Yes [] No  Yes    Do you know of any previous injury or disease contributing to this condition or occupational disease? []Yes [] No (Explain if yes)                          No   Date  12/7/2023 Print Health Care Provider’s Name  ALEX Brantley I certify that the employer’s copy of  this form was delivered to the employer on:   Address  [address] INSURER'S USE ONLY                       [place]  [address] Provider’s Tax ID Number  [id number]   Telephone  Dept: [phone]    Health Care Provider’s Original or Electronic Signature  e-SignSERIC GÓMEZRCHELSEY Degree (MD,DO, DC,PA-C,APRN)  {Provider Degrees:[id number]}  Choose (if applicable)      ORIGINAL - TREATING HEALTHCARE PROVIDER PAGE 2 - INSURER/TPA PAGE 3 - EMPLOYER PAGE 4 - EMPLOYEE             Form C-4 (rev.08/23)        BRIEF DESCRIPTION OF RIGHTS AND BENEFITS  (Pursuant to [place] 616[place].050)    Notice of Injury or Occupational Disease (Incident Report Form C-1): If an injury or occupational disease (OD) arises out of and in the course of employment, you must provide written notice to your employer as soon as practicable, but no later than 7 days after the accident or OD. Your employer shall maintain a sufficient  "supply of the required forms.    Claim for Compensation (Form C-4): If medical treatment is sought, the form C-4 is available at the place of initial treatment. A completed \"Claim for Compensation\" (Form C-4) must be filed within 90 days after an accident or OD. The treating physician or chiropractor must, within 3 working days after treatment, complete and mail to the employer, the employer's insurer and third-party , the Claim for Compensation.    Medical Treatment: If you require medical treatment for your on-the-job injury or OD, you may be required to select a physician or chiropractor from a list provided by your workers’ compensation insurer, if it has contracted with an Organization for Managed Care (MCO) or Preferred Provider Organization (PPO) or providers of health care. If your employer has not entered into a contract with an MCO or PPO, you may select a physician or chiropractor from the Panel of Physicians and Chiropractors. Any medical costs related to your industrial injury or OD will be paid by your insurer.    Temporary Total Disability (TTD): If your doctor has certified that you are unable to work for a period of at least 5 consecutive days, or 5 cumulative days in a 20-day period, or places restrictions on you that your employer does not accommodate, you may be entitled to TTD compensation.    Temporary Partial Disability (TPD): If the wage you receive upon reemployment is less than the compensation for TTD to which you are entitled, the insurer may be required to pay you TPD compensation to make up the difference. TPD can only be paid for a maximum of 24 months.    Permanent Partial Disability (PPD): When your medical condition is stable and there is an indication of a PPD as a result of your injury or OD, within 30 days, your insurer must arrange for an evaluation by a rating physician or chiropractor to determine the degree of your PPD. The amount of your PPD award depends on the " date of injury, the results of the PPD evaluation, your age and wage.    Permanent Total Disability (PTD): If you are medically certified by a treating physician or chiropractor as permanently and totally disabled and have been granted a PTD status by your insurer, you are entitled to receive monthly benefits not to exceed 66 2/3% of your average monthly wage. The amount of your PTD payments is subject to reduction if you previously received a lump-sum PPD award.    Vocational Rehabilitation Services: You may be eligible for vocational rehabilitation services if you are unable to return to the job due to a permanent physical impairment or permanent restrictions as a result of your injury or occupational disease.    Transportation and Per Adeola Reimbursement: You may be eligible for travel expenses and per adeola associated with medical treatment.    Reopening: You may be able to reopen your claim if your condition worsens after claim closure.     Appeal Process: If you disagree with a written determination issued by the insurer or the insurer does not respond to your request, you may appeal to the Department of Administration, , by following the instructions contained in your determination letter. You must appeal the determination within 70 days from the date of the determination letter at [address], [address], [place] [address], or [address], [place] [address]. If you disagree with the  decision, you may appeal to the Department of Administration, . You must file your appeal within 30 days from the date of the  decision letter at [address], [address], [place], [place] [address], or [address] [facility] [address], [place] [address]. If you disagree with a decision of an , you may file a petition for judicial review with the District Court. You must do so within 30 days of the  Appeal Officer’s decision. You may be represented by an  at your own expense or you may contact the [facility] for possible representation.    [place]  for Injured Workers (NAIW): If you disagree with a  decision, you may request that NAIW represent you without charge at an  Hearing. For information regarding denial of benefits, you may contact the [facility] at: [address], [address], [place] [address], [phone], or [address], [place] [address], [phone]    To File a Complaint with the Division: If you wish to file a complaint with the  of the Division of Industrial Relations (DIR),  please contact the Workers’ Compensation Section, [address], [address], [place], [place] [address], telephone [phone], or [address], [address], [place], [place] [address], telephone [phone].    For assistance with Workers’ Compensation Issues: You may contact the [facility] Office for Consumer Health Assistance, [address], [address], [place], [place] [address], Toll Free 1-291.893.2233, Web site: [email or web address]/Programs/LIZ E-mail: [email or web address]              __________________________________________________________________                                    _________________            Employee Name / Signature                                                                                                                            Date                                                                                                                                                                                                                              D-2 (rev. 10/20)
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
## 2023-12-08 ENCOUNTER — APPOINTMENT (OUTPATIENT)
Dept: RADIOLOGY | Facility: IMAGING CENTER | Age: 63
End: 2023-12-08
Attending: NURSE PRACTITIONER
Payer: COMMERCIAL

## 2023-12-08 ENCOUNTER — NON-PROVIDER VISIT (OUTPATIENT)
Dept: URGENT CARE | Facility: PHYSICIAN GROUP | Age: 63
End: 2023-12-08
Payer: COMMERCIAL

## 2023-12-08 PROCEDURE — 73110 X-RAY EXAM OF WRIST: CPT | Mod: TC,FY,RT | Performed by: RADIOLOGY

## 2023-12-15 ENCOUNTER — OCCUPATIONAL MEDICINE (OUTPATIENT)
Dept: URGENT CARE | Facility: PHYSICIAN GROUP | Age: 63
End: 2023-12-15
Payer: COMMERCIAL

## 2023-12-15 VITALS
DIASTOLIC BLOOD PRESSURE: 68 MMHG | HEART RATE: 72 BPM | OXYGEN SATURATION: 98 % | HEIGHT: 67 IN | TEMPERATURE: 96.5 F | RESPIRATION RATE: 14 BRPM | SYSTOLIC BLOOD PRESSURE: 112 MMHG | WEIGHT: 150 LBS | BODY MASS INDEX: 23.54 KG/M2

## 2023-12-15 DIAGNOSIS — S60.211A CONTUSION OF RIGHT WRIST, INITIAL ENCOUNTER: ICD-10-CM

## 2023-12-15 DIAGNOSIS — Z02.6 ENCOUNTER RELATED TO WORKER'S COMPENSATION CLAIM: ICD-10-CM

## 2023-12-15 PROCEDURE — 3078F DIAST BP <80 MM HG: CPT | Performed by: NURSE PRACTITIONER

## 2023-12-15 PROCEDURE — 99213 OFFICE O/P EST LOW 20 MIN: CPT | Performed by: NURSE PRACTITIONER

## 2023-12-15 PROCEDURE — 3074F SYST BP LT 130 MM HG: CPT | Performed by: NURSE PRACTITIONER
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
## 2024-09-09 ENCOUNTER — OFFICE VISIT (OUTPATIENT)
Dept: URGENT CARE | Facility: PHYSICIAN GROUP | Age: 64
End: 2024-09-09
Payer: COMMERCIAL

## 2024-09-09 VITALS
BODY MASS INDEX: 25.87 KG/M2 | DIASTOLIC BLOOD PRESSURE: 80 MMHG | SYSTOLIC BLOOD PRESSURE: 118 MMHG | HEART RATE: 89 BPM | TEMPERATURE: 97.3 F | RESPIRATION RATE: 16 BRPM | OXYGEN SATURATION: 97 % | HEIGHT: 67 IN | WEIGHT: 164.8 LBS

## 2024-09-09 DIAGNOSIS — B37.81 THRUSH OF MOUTH AND ESOPHAGUS (HCC): ICD-10-CM

## 2024-09-09 DIAGNOSIS — E11.9 TYPE 2 DIABETES MELLITUS WITHOUT COMPLICATION, WITHOUT LONG-TERM CURRENT USE OF INSULIN (HCC): ICD-10-CM

## 2024-09-09 DIAGNOSIS — B37.0 THRUSH OF MOUTH AND ESOPHAGUS (HCC): ICD-10-CM

## 2024-09-09 DIAGNOSIS — B34.9 ACUTE VIRAL SYNDROME: ICD-10-CM

## 2024-09-09 DIAGNOSIS — J02.9 PHARYNGITIS, UNSPECIFIED ETIOLOGY: ICD-10-CM

## 2024-09-09 LAB
FLUAV RNA SPEC QL NAA+PROBE: NEGATIVE
FLUBV RNA SPEC QL NAA+PROBE: NEGATIVE
RSV RNA SPEC QL NAA+PROBE: NEGATIVE
S PYO DNA SPEC NAA+PROBE: NOT DETECTED
SARS-COV-2 RNA RESP QL NAA+PROBE: NEGATIVE

## 2024-09-09 PROCEDURE — 0241U POCT CEPHEID COV-2, FLU A/B, RSV - PCR: CPT

## 2024-09-09 PROCEDURE — 99214 OFFICE O/P EST MOD 30 MIN: CPT

## 2024-09-09 PROCEDURE — 3079F DIAST BP 80-89 MM HG: CPT

## 2024-09-09 PROCEDURE — 3074F SYST BP LT 130 MM HG: CPT

## 2024-09-09 PROCEDURE — 87651 STREP A DNA AMP PROBE: CPT

## 2024-09-09 RX ORDER — NYSTATIN 100000 [USP'U]/ML
500000 SUSPENSION ORAL 4 TIMES DAILY
Qty: 280 ML | Refills: 0 | Status: SHIPPED | OUTPATIENT
Start: 2024-09-09 | End: 2024-09-23

## 2024-09-09 RX ORDER — LIDOCAINE HYDROCHLORIDE 20 MG/ML
5 SOLUTION OROPHARYNGEAL ONCE
Status: COMPLETED | OUTPATIENT
Start: 2024-09-09 | End: 2024-09-09

## 2024-09-09 RX ORDER — LIDOCAINE HYDROCHLORIDE 20 MG/ML
15 SOLUTION OROPHARYNGEAL PRN
Qty: 1200 ML | Refills: 0 | Status: SHIPPED | OUTPATIENT
Start: 2024-09-09

## 2024-09-09 RX ORDER — GLIMEPIRIDE 4 MG/1
4 TABLET ORAL EVERY MORNING
COMMUNITY
Start: 2024-07-16

## 2024-09-09 RX ADMIN — LIDOCAINE HYDROCHLORIDE 5 ML: 20 SOLUTION OROPHARYNGEAL at 11:58

## 2024-09-09 ASSESSMENT — ENCOUNTER SYMPTOMS
BLURRED VISION: 0
ABDOMINAL PAIN: 0
SORE THROAT: 1
CHILLS: 1
VOMITING: 0
COUGH: 1
DIAPHORESIS: 0
FEVER: 0
DIARRHEA: 0
EYE DISCHARGE: 0
PSYCHIATRIC NEGATIVE: 1
SHORTNESS OF BREATH: 0
WHEEZING: 0
BLOOD IN STOOL: 0
DIZZINESS: 0
SPUTUM PRODUCTION: 0
MYALGIAS: 1
SINUS PAIN: 0
NAUSEA: 0
WEAKNESS: 0
HEADACHES: 1

## 2024-09-09 NOTE — PATIENT INSTRUCTIONS
-Cool-mist humidifier for nighttime use.   -Practice good hand hygiene.  -Zinc 75 mg daily has been shown to be effective in reducing the duration of cold symptoms.   -You may use either acetaminophen or ibuprofen over-the-counter every 6-8 hours for pain or fever if that is not contraindicated with your body.    -Saline Nasal Spray and Flonase may be used for congestion. Nasal saline in the nose helps to help break up nasal secretions, and is beneficial for nasal symptoms and throat pain.  -Saline Nasal Rinse with a Neti pot or Sandoval med rinse can be used multiple times a day. Be sure to use distilled water or boil tap water for 10 mins. Add a saline packet. Be sure the water is not too hot (around your body temp of 98 degrees)  -Decongestants are not recommended as they can have a rebound congestion effect along with many side effects (especially if you have high blood pressure).   -Chloraseptic lozenge, warm tea with honey or  throat spray to help with discomfort.  -Salt water gargles for sore throat several times a day.
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
## 2024-09-30 ENCOUNTER — HOSPITAL ENCOUNTER (OUTPATIENT)
Facility: MEDICAL CENTER | Age: 64
End: 2024-09-30
Attending: FAMILY MEDICINE
Payer: COMMERCIAL

## 2024-09-30 ENCOUNTER — OFFICE VISIT (OUTPATIENT)
Dept: URGENT CARE | Facility: PHYSICIAN GROUP | Age: 64
End: 2024-09-30
Payer: COMMERCIAL

## 2024-09-30 VITALS
SYSTOLIC BLOOD PRESSURE: 118 MMHG | OXYGEN SATURATION: 97 % | DIASTOLIC BLOOD PRESSURE: 62 MMHG | RESPIRATION RATE: 14 BRPM | WEIGHT: 164 LBS | HEIGHT: 67 IN | BODY MASS INDEX: 25.74 KG/M2 | TEMPERATURE: 97 F | HEART RATE: 67 BPM

## 2024-09-30 DIAGNOSIS — R30.0 DYSURIA: ICD-10-CM

## 2024-09-30 DIAGNOSIS — B37.0 THRUSH: ICD-10-CM

## 2024-09-30 LAB
APPEARANCE UR: NORMAL
BILIRUB UR STRIP-MCNC: NORMAL MG/DL
COLOR UR AUTO: YELLOW
GLUCOSE UR STRIP.AUTO-MCNC: >=1000 MG/DL
KETONES UR STRIP.AUTO-MCNC: NORMAL MG/DL
LEUKOCYTE ESTERASE UR QL STRIP.AUTO: NORMAL
NITRITE UR QL STRIP.AUTO: NORMAL
PH UR STRIP.AUTO: 7 [PH] (ref 5–8)
PROT UR QL STRIP: 30 MG/DL
RBC UR QL AUTO: NORMAL
SP GR UR STRIP.AUTO: 1.02
UROBILINOGEN UR STRIP-MCNC: 0.2 MG/DL

## 2024-09-30 PROCEDURE — 87186 SC STD MICRODIL/AGAR DIL: CPT

## 2024-09-30 PROCEDURE — 87077 CULTURE AEROBIC IDENTIFY: CPT

## 2024-09-30 PROCEDURE — 87086 URINE CULTURE/COLONY COUNT: CPT

## 2024-09-30 RX ORDER — FLUCONAZOLE 150 MG/1
150 TABLET ORAL
Qty: 2 TABLET | Refills: 0 | Status: SHIPPED | OUTPATIENT
Start: 2024-09-30 | End: 2024-10-04

## 2024-09-30 RX ORDER — CEFDINIR 300 MG/1
300 CAPSULE ORAL 2 TIMES DAILY
Qty: 10 CAPSULE | Refills: 0 | Status: SHIPPED | OUTPATIENT
Start: 2024-09-30 | End: 2024-10-05

## 2024-09-30 ASSESSMENT — ENCOUNTER SYMPTOMS
SORE THROAT: 1
FEVER: 0

## 2024-09-30 NOTE — PROGRESS NOTES
Subjective:     Gem Barnett is a 64 y.o. female who presents for Painful Urination (X 1 day)    HPI  Pt presents for evaluation of an acute problem  Pt with dysuria and urinary frequency the past 2 days   Has had some dark urine   No hematuria   Has a little lower abd pain   Has some chronic low back pain which feels stable and not worsened   No vomiting, no fevers     Review of Systems   Constitutional:  Negative for fever.   HENT:  Positive for sore throat.    Genitourinary:  Positive for dysuria and urgency.   Skin:  Negative for rash.       PMH:  has a past medical history of Anxious depression (2/26/2016), Atrophic vaginitis (2/26/2016), Avulsion of right knee (2/26/2016), Diabetes, HPV in female (2/26/2016), Hypothyroid, LBP (low back pain), Rash (9/22/2015), Stress incontinence of urine (4/28/2017), and Trigger finger.  MEDS:   Current Outpatient Medications:     cefdinir (OMNICEF) 300 MG Cap, Take 1 Capsule by mouth 2 times a day for 5 days., Disp: 10 Capsule, Rfl: 0    fluconazole (DIFLUCAN) 150 MG tablet, Take 1 Tablet by mouth every 72 hours for 2 doses., Disp: 2 Tablet, Rfl: 0    glimepiride (AMARYL) 4 MG Tab, Take 4 mg by mouth every morning., Disp: , Rfl:     lidocaine (XYLOCAINE) 2 % Solution, Take 15 mL by mouth as needed for Throat/Mouth Pain., Disp: 1200 mL, Rfl: 0    escitalopram (LEXAPRO) 20 MG tablet, Take 1 tablet by mouth once daily, Disp: 90 Tablet, Rfl: 0    montelukast (SINGULAIR) 10 MG Tab, Take 1 Tablet by mouth every day., Disp: 90 Tablet, Rfl: 3    phenazopyridine (PYRIDIUM) 200 MG Tab, Take 1 Tablet by mouth 3 times a day as needed (urinary pain)., Disp: 6 Tablet, Rfl: 0    Continuous Blood Gluc Sensor (FREESTYLE LORENZO 2 SENSOR) Misc, CHANGE AS DIRECTED EVERY 14 DAYS, Disp: , Rfl:     levothyroxine (EUTHYROX) 125 MCG Tab, Take 1 Tablet by mouth every morning on an empty stomach., Disp: 90 Tablet, Rfl: 3    ipratropium-albuterol (DUONEB) 0.5-2.5 (3) MG/3ML nebulizer solution, Take 3  "mL by nebulization 4 times a day., Disp: 120 Each, Rfl: 3    albuterol (PROAIR HFA) 108 (90 Base) MCG/ACT Aero Soln inhalation aerosol, Inhale 2 Puffs every 6 hours as needed for Shortness of Breath., Disp: 8.5 g, Rfl: 3    Insulin Degludec (TRESIBA FLEXTOUCH) 200 UNIT/ML Solution Pen-injector, Inject 80 Units under the skin every day., Disp: 72 mL, Rfl: 3    Blood Glucose Test Strips, Test strips order: One Touch Verio Sig: use 3x/day and prn ssx high or low sugar, Disp: 300 Strip, Rfl: 3    Lancets, Lancets order:One Touch Delica Sig: use 3x/day and prn ssx high or low sugar., Disp: 300 Each, Rfl: 3    Insulin Pen Needle 32 G x 4 mm, 1 Each by Does not apply route 3 times a day., Disp: 300 Each, Rfl: 3    hydrocodone-acetaminophen (VICODIN) 5-500 MG Tab, Take 1-2 Tabs by mouth every four hours as needed., Disp: , Rfl:     Multiple Vitamin (MULTIVITAMINS PO), Take  by mouth., Disp: , Rfl:   ALLERGIES:   Allergies   Allergen Reactions    Farxiga [Dapagliflozin] Itching     Complains of hives and itching    Invokana [Canagliflozin] Itching     Complains of hives and itching    Jardiance [Empagliflozin] Itching     SURGHX: No past surgical history on file.  SOCHX:  reports that she has never smoked. She has never used smokeless tobacco. She reports current alcohol use of about 0.6 oz of alcohol per week. She reports that she does not use drugs.     Objective:   /62   Pulse 67   Temp 36.1 °C (97 °F) (Temporal)   Resp 14   Ht 1.702 m (5' 7\")   Wt 74.4 kg (164 lb)   LMP 06/30/2011   SpO2 97%   BMI 25.69 kg/m²     Physical Exam  Constitutional:       General: She is not in acute distress.     Appearance: She is well-developed. She is not diaphoretic.   Pulmonary:      Effort: Pulmonary effort is normal.   Abdominal:      General: Abdomen is flat. Bowel sounds are normal. There is no distension.      Palpations: Abdomen is soft.      Tenderness: There is no abdominal tenderness. There is no right CVA " tenderness, left CVA tenderness, guarding or rebound.   Neurological:      Mental Status: She is alert.         Assessment/Plan:   Assessment    1. Dysuria  - POCT Urinalysis  - cefdinir (OMNICEF) 300 MG Cap; Take 1 Capsule by mouth 2 times a day for 5 days.  Dispense: 10 Capsule; Refill: 0  - URINE CULTURE(NEW); Future    2. Thrush  - fluconazole (DIFLUCAN) 150 MG tablet; Take 1 Tablet by mouth every 72 hours for 2 doses.  Dispense: 2 Tablet; Refill: 0    Patient with dysuria.  Point-of-care urine with moderate blood, glucosuria, and advised that it is not at 100% consistent with infection.  Her symptoms are classic for acute cystitis.  Empirically start cefdinir and send urine for culture.  Patient recommended to stay very well-hydrated and reviewed other supportive care measures.  Follow-up in the urgent care as needed.

## 2024-10-07 ENCOUNTER — HOSPITAL ENCOUNTER (OUTPATIENT)
Facility: MEDICAL CENTER | Age: 64
End: 2024-10-07
Attending: STUDENT IN AN ORGANIZED HEALTH CARE EDUCATION/TRAINING PROGRAM
Payer: COMMERCIAL

## 2024-10-07 ENCOUNTER — OFFICE VISIT (OUTPATIENT)
Dept: URGENT CARE | Facility: PHYSICIAN GROUP | Age: 64
End: 2024-10-07
Payer: COMMERCIAL

## 2024-10-07 VITALS
OXYGEN SATURATION: 95 % | DIASTOLIC BLOOD PRESSURE: 70 MMHG | TEMPERATURE: 97.1 F | SYSTOLIC BLOOD PRESSURE: 110 MMHG | RESPIRATION RATE: 16 BRPM | HEART RATE: 90 BPM | BODY MASS INDEX: 25.74 KG/M2 | WEIGHT: 164 LBS | HEIGHT: 67 IN

## 2024-10-07 DIAGNOSIS — N39.0 RECURRENT UTI: ICD-10-CM

## 2024-10-07 DIAGNOSIS — R30.0 DYSURIA: ICD-10-CM

## 2024-10-07 DIAGNOSIS — R30.9 PAINFUL URINATION: ICD-10-CM

## 2024-10-07 DIAGNOSIS — R21 RASH: ICD-10-CM

## 2024-10-07 LAB
APPEARANCE UR: NORMAL
BILIRUB UR STRIP-MCNC: NORMAL MG/DL
COLOR UR AUTO: YELLOW
GLUCOSE UR STRIP.AUTO-MCNC: >=1000 MG/DL
KETONES UR STRIP.AUTO-MCNC: NORMAL MG/DL
LEUKOCYTE ESTERASE UR QL STRIP.AUTO: NORMAL
NITRITE UR QL STRIP.AUTO: NORMAL
PH UR STRIP.AUTO: 5.5 [PH] (ref 5–8)
PROT UR QL STRIP: NORMAL MG/DL
RBC UR QL AUTO: NORMAL
SP GR UR STRIP.AUTO: 1.02
UROBILINOGEN UR STRIP-MCNC: 0.2 MG/DL

## 2024-10-07 PROCEDURE — 99214 OFFICE O/P EST MOD 30 MIN: CPT | Performed by: STUDENT IN AN ORGANIZED HEALTH CARE EDUCATION/TRAINING PROGRAM

## 2024-10-07 PROCEDURE — 87086 URINE CULTURE/COLONY COUNT: CPT

## 2024-10-07 PROCEDURE — 3074F SYST BP LT 130 MM HG: CPT | Performed by: STUDENT IN AN ORGANIZED HEALTH CARE EDUCATION/TRAINING PROGRAM

## 2024-10-07 PROCEDURE — 81002 URINALYSIS NONAUTO W/O SCOPE: CPT | Performed by: STUDENT IN AN ORGANIZED HEALTH CARE EDUCATION/TRAINING PROGRAM

## 2024-10-07 PROCEDURE — 3078F DIAST BP <80 MM HG: CPT | Performed by: STUDENT IN AN ORGANIZED HEALTH CARE EDUCATION/TRAINING PROGRAM

## 2024-10-07 RX ORDER — NITROFURANTOIN 25; 75 MG/1; MG/1
100 CAPSULE ORAL 2 TIMES DAILY
Qty: 10 CAPSULE | Refills: 0 | Status: SHIPPED
Start: 2024-10-07 | End: 2024-10-12

## 2024-10-07 RX ORDER — NYSTATIN 100000 [USP'U]/G
1 POWDER TOPICAL 3 TIMES DAILY
Qty: 30 G | Refills: 0 | Status: SHIPPED
Start: 2024-10-07

## 2024-10-07 RX ORDER — PHENAZOPYRIDINE HYDROCHLORIDE 200 MG/1
200 TABLET, FILM COATED ORAL 3 TIMES DAILY PRN
Qty: 6 TABLET | Refills: 0 | Status: SHIPPED
Start: 2024-10-07

## 2024-10-07 ASSESSMENT — ENCOUNTER SYMPTOMS
CARDIOVASCULAR NEGATIVE: 1
CONSTITUTIONAL NEGATIVE: 1
RESPIRATORY NEGATIVE: 1
FLANK PAIN: 0
GASTROINTESTINAL NEGATIVE: 1

## 2024-10-08 DIAGNOSIS — R30.0 DYSURIA: ICD-10-CM

## 2024-10-08 DIAGNOSIS — N39.0 RECURRENT UTI: ICD-10-CM

## 2024-10-08 DIAGNOSIS — R30.9 PAINFUL URINATION: ICD-10-CM

## 2024-10-10 LAB
BACTERIA UR CULT: NORMAL
SIGNIFICANT IND 70042: NORMAL
SITE SITE: NORMAL
SOURCE SOURCE: NORMAL

## 2024-12-03 ENCOUNTER — OCCUPATIONAL MEDICINE (OUTPATIENT)
Dept: URGENT CARE | Facility: PHYSICIAN GROUP | Age: 64
End: 2024-12-03
Payer: COMMERCIAL

## 2024-12-03 VITALS
RESPIRATION RATE: 16 BRPM | OXYGEN SATURATION: 97 % | HEART RATE: 98 BPM | BODY MASS INDEX: 25.74 KG/M2 | DIASTOLIC BLOOD PRESSURE: 80 MMHG | TEMPERATURE: 98.1 F | SYSTOLIC BLOOD PRESSURE: 110 MMHG | HEIGHT: 67 IN | WEIGHT: 164 LBS
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
## 2024-12-03 DIAGNOSIS — S83.411A SPRAIN OF MEDIAL COLLATERAL LIGAMENT OF RIGHT KNEE, INITIAL ENCOUNTER: ICD-10-CM

## 2024-12-03 DIAGNOSIS — S80.02XA CONTUSION OF LEFT KNEE, INITIAL ENCOUNTER: ICD-10-CM

## 2024-12-03 DIAGNOSIS — S60.222A CONTUSION OF LEFT HAND, INITIAL ENCOUNTER: ICD-10-CM

## 2024-12-03 PROCEDURE — 3079F DIAST BP 80-89 MM HG: CPT | Performed by: FAMILY MEDICINE

## 2024-12-03 PROCEDURE — 3074F SYST BP LT 130 MM HG: CPT | Performed by: FAMILY MEDICINE

## 2024-12-03 PROCEDURE — 1125F AMNT PAIN NOTED PAIN PRSNT: CPT | Performed by: FAMILY MEDICINE

## 2024-12-03 PROCEDURE — 99213 OFFICE O/P EST LOW 20 MIN: CPT | Performed by: FAMILY MEDICINE

## 2024-12-03 ASSESSMENT — PAIN SCALES - GENERAL: PAINLEVEL_OUTOF10: 7=MODERATE-SEVERE PAIN

## 2024-12-03 ASSESSMENT — ENCOUNTER SYMPTOMS: FEVER: 0

## 2024-12-03 NOTE — LETTER
"    EMPLOYEE’S CLAIM FOR COMPENSATION/ REPORT OF INITIAL TREATMENT  FORM C-4  PLEASE TYPE OR PRINT    EMPLOYEE’S CLAIM - PROVIDE ALL INFORMATION REQUESTED   First Name                    ARTUR Rabago                  Last Name  Anibal Birthdate                    1960                Sex  Female Claim Number (Insurer’s Use Only)     Home Address  [address] Age  64 y.o. 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  Telephone  [phone]    Office Mail Address (Number and Street)  [address]     Date of Injury (if applicable) 12/3/2024               Hours Injury (if applicable)  11:10 AM Date Employer Notified  12/3/2024 Last Day of Work after Injury or Occupational Disease  12/3/2024 Supervisor to Whom Injury Reported  Mr. Barlow   Address or Location of Accident (if applicable)  Work [1]   What were you doing at the time of accident? 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 (Be specific and answer in detail. Use additional sheet if necessary)  student accidentally tripped me,fell onto hard mack   If you believe that you have an occupational disease, when did you first have knowledge of the disability and its relationship to your employment?  N/A Witnesses to the Accident (if applicable)  N/A      Nature of Injury or Occupational Disease  Workers' Compensation  Part(s) of " Body Injured or Affected  Wrist (R) and Hand (R) Wrist (L) and Hand (L) Upper Back Area (Thoracic Area)    I CERTIFY THAT THE ABOVE IS TRUE AND CORRECT TO T HE BEST OF MY KNOWLEDGE AND THAT I HAVE PROVIDED THIS INFORMATION IN ORDER TO OBTAIN THE BENEFITS OF [place]CCUP[place], OR CHAPTER 617 [place]).  I HEREBY AUTHORIZE ANY PHYSICIAN, CHIROPRACTOR, SURGEON, PRACTITIONER OR ANY OTHER PERSON, ANY HOSPITAL, INCLUDING [facility] OR [facility], ANY  MEDICAL SERVICE ORGANIZATION, ANY INSURANCE COMPANY, OR OTHER INSTITUTION OR ORGANIZATION TO RELEASE TO EACH OTHER, ANY MEDICAL OR OTHER INFORMATION, INCLUDING BENEFITS PAID OR PAYABLE, PERTINENT TO THIS INJURY OR DISEASE, EXCEPT INFORMATION RELATIVE TO DIAGNOSIS, TREATMENT AND/OR COUNSELING FOR AIDS, PSYCHOLOGICAL CONDITIONS, ALCOHOL OR CONTROLLED SUBSTANCES, FOR WHICH I MUST GIVE SPECIFIC AUTHORIZATION.  A PHOTOSTAT OF THIS AUTHORIZATION SHALL BE VALID AS THE ORIGINAL.     Date   Place Employee’s Original or  *Electronic Signature   THIS REPORT MUST BE COMPLETED AND MAILED WITHIN 3 WORKING DAYS OF TREATMENT   Place  [facility]    Name of Facility  [facility]   Date 12/3/2024 Diagnosis and Description of Injury or Occupational Disease  (W18.30XA) Ground-level fall  (S83.411A) Sprain of medial collateral ligament of right knee, initial encounter  (S80.02XA) Contusion of left knee, initial encounter  (S60.222A) Contusion of left hand, initial encounter  Diagnoses of Ground-level fall, Sprain of medial collateral ligament of right knee, initial encounter, Contusion of left knee, initial encounter, and Contusion of left hand, initial encounter were pertinent to this visit. Is there evidence that the injured employee was under the influence of alcohol and/or another controlled substance at the time of accident?  []No  [] Yes (if yes, please explain)   Hour 4:31 PM  No    Treatment: Hinged knee brace on her right side, NSAIDs, and activity modification    Have you advised the patient to remain off work five days or more?   [] Yes Indicate dates: From   To    [] No      If no, is the injured employee capable of: [] full duty [] modified duty                     If modified duty, specify any limitations / restrictions:  Limit standing/walking to less than 2 hours per shift, prefer mostly seated work                                                                                                                                                                                                                                                                                                                                                                                                               X-Ray Findings:      From information given by the employee, together with medical evidence, can you directly connect this injury or occupational disease as job incurred?  []Yes   [] No Yes    Is additional medical care by a physician indicated? []Yes [] No  Yes    Do you know of any previous injury or disease contributing to this condition or occupational disease? []Yes [] No (Explain if yes)                          No   Date  12/3/2024 Print Health Care Provider’s Name  Swathi Cazares M.D. I certify that the employer’s copy of  this form was delivered to the employer on:   Address  [address] [place] [address] INSURER'S USE ONLY                       [facility]  [id number] Provider’s Tax ID Number  [id number]   Telephone  Dept: [phone]    Health Care Provider’s Original or Electronic Signature  e-SWATHI Ponce M.D. Degree (MD,DO, DC,PA-C,APRN)  MD  Choose (if applicable)      ORIGINAL - TREATING HEALTHCARE PROVIDER PAGE 2 - INSURER/TPA PAGE 3 - EMPLOYER PAGE 4 - EMPLOYEE             Form C-4 (rev.08/23)

## 2024-12-03 NOTE — LETTER
PHYSICIAN’S AND CHIROPRACTIC PHYSICIAN'S   PROGRESS REPORT   CERTIFICATION OF DISABILITY Claim Number:     Social Security Number:    Patient’s Name: Gem Barnett Date of Injury: 12/3/2024   Employer: [employer] 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 Yes     Explain: Tripped by a child at work      Objective Medical Findings: Tenderness along the pes anserine and medial knee of the right knee.  Medial pain with valgus stress.  Ligaments otherwisestable with no sign of tear         None - Discharged                         Stable  No                 Ratable  No        Generally Improved                         Condition Worsened                  Condition Same  May Have Suffered a Permanent Disability No     Treatment Plan:    Hinged knee brace on her right side, NSAIDs, and activity modification         No Change in Therapy                  PT/OT Prescribed                      Medication May be Used While Working        Case Management                          PT/OT Discontinued    Consultation    Further Diagnostic Studies:    Prescription(s)                 Released to FULL DUTY /No Restrictions on (Date):       Certified TOTALLY TEMPORARILY DISABLED (Indicate Dates) From:   To:    X  Released to RESTRICTED/Modified Duty on (Date): From: 12/3/2024 To: 12/11/2024  Restrictions Are:  Temporary      No Sitting    No Standing    No Pulling Other: Limit standing/walking to less than 2 hours per shift, prefer mostly seated work       No Bending at Waist     No Stooping     No Lifting         No Carrying     No Walking Lifting Restricted to (lbs.):          No Pushing        No Climbing     No Reaching Above Shoulders       Date of Next Visit:  12/3/2024 Date of this Exam: 12/3/2024 Physician/Chiropractic Physician Name: Bogdan Cazares M.D. Physician/Chiropractic Physician Signature:  Capo Swift DO MPH     [place]:  [address] [place] [address], [address] [place], [place] [address] - Telephone [phone] [place]:  [address] [place] [address], [address] [place], [place] [address] - Telephone [phone]    [email or web address]  D-39 (Rev. 10/24)

## 2024-12-03 NOTE — LETTER
PHYSICIAN’S AND CHIROPRACTIC PHYSICIAN'S   PROGRESS REPORT   CERTIFICATION OF DISABILITY Claim Number:     Social Security Number:    Patient’s Name: Gem Barnett Date of Injury: 12/3/2024   Employer: [employer] 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 Yes     Explain: Tripped by a child at work      Objective Medical Findings: Tenderness along the pes anserine and medial knee of the right knee.  Medial pain with valgus stress.  Ligaments otherwisestable with no sign of tear         None - Discharged                         Stable  No                 Ratable  No        Generally Improved                         Condition Worsened                  Condition Same  May Have Suffered a Permanent Disability No     Treatment Plan:    Hinged knee brace on her right side, NSAIDs, and activity modification         No Change in Therapy                  PT/OT Prescribed                      Medication May be Used While Working        Case Management                          PT/OT Discontinued    Consultation    Further Diagnostic Studies:    Prescription(s)                 Released to FULL DUTY /No Restrictions on (Date):       Certified TOTALLY TEMPORARILY DISABLED (Indicate Dates) From:   To:    X  Released to RESTRICTED/Modified Duty on (Date): From: 12/3/2024 To: 12/11/2024  Restrictions Are:  Temporary      No Sitting    No Standing    No Pulling Other: Limit standing/walking to less than 2 hours per shift, prefer mostly seated work       No Bending at Waist     No Stooping     No Lifting         No Carrying     No Walking Lifting Restricted to (lbs.):          No Pushing        No Climbing     No Reaching Above Shoulders       Date of Next Visit:  12/11/2024 Date of this Exam: 12/3/2024 Physician/Chiropractic Physician Name: Bogdan Cazares M.D. Physician/Chiropractic Physician Signature:  Capo Swift DO MPH     [place]:  [address] [place] [address], [address] [place], [place] [address] - Telephone [phone] [place]:  [address] [place] [address], [address] [place], [place] [address] - Telephone [phone]    [email or web address]  D-39 (Rev. 10/24)

## 2024-12-04 NOTE — PROGRESS NOTES
"Subjective:     Gem Barnett is a 64 y.o. female who presents for Work-Related Injury (Fall-tripped over a student. Hurt knees. No abrasions. Caught self on hands. Neck pain. )    HPI  Pt presents for evaluation of an acute problem  DOI: 12/3/2024  RANI: Patient with a ground-level fall after tripping over a student.  Fell straight forward and caught herself on her hands  Has injuries to knees and neck  Worst injury is the right knee  Has had no numbness or tingling  No areas are swollen or physically bruised/discolored     Review of Systems   Constitutional:  Negative for fever.   Skin:  Negative for rash.     PMH: Past medical history reviewed in Epic  MEDS: Medications were reviewed in Epic  ALLERGIES: Allergies were reviewed in Epic     Objective:   /80   Pulse 98   Temp 36.7 °C (98.1 °F) (Temporal)   Resp 16   Ht 1.702 m (5' 7\")   Wt 74.4 kg (164 lb)   LMP 06/30/2011   SpO2 97%   BMI 25.69 kg/m²     Physical Exam  Constitutional:       General: She is not in acute distress.     Appearance: She is well-developed. She is not diaphoretic.   Pulmonary:      Effort: Pulmonary effort is normal.   Neurological:      Mental Status: She is alert.     Bilateral knee  Appearance - No bruising, erythema, or deformity appreciated  Palpation - +TTP at pes anserine bilaterally with tenderness along the medial knee and medial quad on the right knee, no tenderness to palpation along joint lines, patellar tendon, hamstring tendons, or remaining quads.  No palpable effusion.  ROM - FROM with mild crepitus  Special testing - No laxity with varus/valgus stress, +medial pain with valgus stress on right knee, neg anterior drawer, neg posterior drawer, neg Lachman's, neg Jae's, neg patellar apprehension test    Left wrist/hand  General: no gross deformity, ecchymosis, or erythema  Palpation: TTP mildly throughout the thenar eminence with no bony tenderness at the distal radius or anatomical snuffbox  ROM: FROM " throughout   Strength: 5/5 flexion, 5/5 extension  Neuro: median, radial, ulnar nerves intact on testing  Vascular: radial, ulnar pulses 2+, cap refill <2 sec    Assessment/Plan:   Assessment    1. Ground-level fall    2. Sprain of medial collateral ligament of right knee, initial encounter    3. Contusion of left knee, initial encounter    4. Contusion of left hand, initial encounter    Patient with multiple areas of contusion and also with right MCL sprain of the knee.  Recommended hinged knee brace on the right, NSAIDs, activity modification, and should make great improvements.  C4 D39 given with work restrictions.  Follow-up in about a week to monitor improvements.

## 2024-12-18 ENCOUNTER — OCCUPATIONAL MEDICINE (OUTPATIENT)
Dept: URGENT CARE | Facility: PHYSICIAN GROUP | Age: 64
End: 2024-12-18
Payer: COMMERCIAL

## 2024-12-18 VITALS
WEIGHT: 164 LBS | RESPIRATION RATE: 16 BRPM | TEMPERATURE: 96.9 F | SYSTOLIC BLOOD PRESSURE: 120 MMHG | HEART RATE: 103 BPM | OXYGEN SATURATION: 96 % | BODY MASS INDEX: 25.74 KG/M2 | HEIGHT: 67 IN | DIASTOLIC BLOOD PRESSURE: 60 MMHG

## 2024-12-18 DIAGNOSIS — S83.411D SPRAIN OF MEDIAL COLLATERAL LIGAMENT OF RIGHT KNEE, SUBSEQUENT ENCOUNTER: ICD-10-CM

## 2024-12-18 PROCEDURE — 99213 OFFICE O/P EST LOW 20 MIN: CPT

## 2024-12-18 PROCEDURE — 3074F SYST BP LT 130 MM HG: CPT

## 2024-12-18 PROCEDURE — 3078F DIAST BP <80 MM HG: CPT

## 2024-12-18 RX ORDER — IBUPROFEN 800 MG/1
800 TABLET, FILM COATED ORAL EVERY 8 HOURS PRN
Qty: 30 TABLET | Refills: 0 | Status: SHIPPED | OUTPATIENT
Start: 2024-12-18 | End: 2024-12-18

## 2024-12-18 RX ORDER — ACETAMINOPHEN 500 MG
500-1000 TABLET ORAL EVERY 6 HOURS PRN
Qty: 30 TABLET | Refills: 0 | Status: SHIPPED | OUTPATIENT
Start: 2024-12-18

## 2024-12-18 ASSESSMENT — ENCOUNTER SYMPTOMS
NECK PAIN: 1
VOMITING: 0
SHORTNESS OF BREATH: 0
FEVER: 0
ABDOMINAL PAIN: 0
WHEEZING: 0
DIZZINESS: 0
BACK PAIN: 1
HEADACHES: 0
WEAKNESS: 0
COUGH: 0
DIARRHEA: 0
CHILLS: 0
PALPITATIONS: 0
NAUSEA: 0

## 2024-12-18 NOTE — LETTER
PHYSICIAN’S AND CHIROPRACTIC PHYSICIAN'S   PROGRESS REPORT   CERTIFICATION OF DISABILITY Claim Number:     Social Security Number:    Patient’s Name: Gem Barnett Date of Injury: 12/3/2024   Employer: [email or web address] 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 Yes     Explain: Patient with a ground-level fall after tripping over a student.  Fell straight forward and caught herself on her hands  Has injuries to knees and neck  Worst injury is the right knee  Has had no numbness or tingling  No areas are swollen or physically bruised/discolored       Objective Medical Findings: Range of motion to neck, back, bilateral wrists, and bilateral knees intact.  Equal strength in bilateral wrists and bilateral knees.    Pain upon palpation to trapezius muscle without warmth, erythema, edema, or deformity to cervical spine.  Pain also noted upon palpation to bilateral hips without significant deformity to lumbar spine.  No significant deformity, decreased strength, or decreased range of motion to bilateral knees.  Slight tenderness upon palpation to bilateral posterior medial knee ligaments.  No pain upon palpation to bony prominences of the knees.         None - Discharged                         Stable  No                 Ratable  No     X   Generally Improved                         Condition Worsened               X   Condition Same  May Have Suffered a Permanent Disability No     Treatment Plan:    Continue to rest and elevate bilateral knees as tolerated throughout the day.  Limit standing to 2 hours per shift.  Avoid heavy lifting of students.  Patient can push/pull as  tolerated.  Continue right knee immobilizer until pain subsides, and left knee sleeve for stability until pain subsides.  Can take Tylenol up to 1000 mg every 6 hours as needed for pain.  Continue OTC Aleve up to 800 mg every 8 hours.  Rotate doses of Tylenol and NSAIDs.  Ice, especially at night.  Heat in between.  Patient educated that sprains can take 6 to 8 weeks to heal.  Given new onset back and neck pain will refer to occupational medicine for complexity of injury.         No Change in Therapy                  PT/OT Prescribed                   X   Medication May be Used While Working        Case Management                          PT/OT Discontinued  X  Consultation    Further Diagnostic Studies:    Prescription(s) Occupational medicine         Tylenol 500-1000mg every 6 hours as needed for breakthrough pain unrelieved by Aleive     Released to FULL DUTY /No Restrictions on (Date):       Certified TOTALLY TEMPORARILY DISABLED (Indicate Dates) From:   To:    X  Released to RESTRICTED/Modified Duty on (Date): From: 12/18/2024 To: 1/3/2025  Restrictions Are:         No Sitting    No Standing    No Pulling Other: Limit standing to up to 2 hours in a work shift. Elevate painful knee throughout shift. Avoid lifting patients if able.       No Bending at Waist     No Stooping     No Lifting        No Carrying     No Walking Lifting Restricted to (lbs.):          No Pushing        No Climbing     No Reaching Above Shoulders       Date of Next Visit:  1/3/2025  1:15 PM    Date of this Exam: 12/18/2024 Physician/Chiropractic Physician Name: ALEX Montes Physician/Chiropractic Physician Signature:  Capo Swift DO MPH     [place]:  [address] [place] [address], [address] [place], [place] [address] - Telephone [phone] [place]:  [address]  [facility], [address] [place], [place] [address] - Telephone [phone]    [email or web address]  D-39 (Rev. 10/24)

## 2024-12-18 NOTE — PROGRESS NOTES
"Subjective     Gem Barnett is a 64 y.o. female who presents with Work-Related Injury          HPI  DOI: 12/3/2024  RANI: Patient with a ground-level fall after tripping over a student.  Fell straight forward and caught herself on her hands  Has injuries to knees and neck  Worst injury is the right knee  Has had no numbness or tingling  No areas are swollen or physically bruised/discolored     Visit #2:  Patient returning to clinic 2 weeks after initial injury.  Patient reporting right knee showing some signs of improvement with pain.  Patient has been compliant with knee brace, and has been taking Aleve as needed for pain.  However patient reporting left knee has been compensating for right knee pain, and has increasing pain.  She got a knee sleeve online for the left knee.  Patient also reports the injury has now caused her cervical spine and bilateral hips arm pain due to compensation for injured right knee.  Patient reports bilateral wrist still have mild pain, but are also showing slight improvement from previous visit.    Review of Systems   Constitutional:  Negative for chills and fever.   Respiratory:  Negative for cough, shortness of breath and wheezing.    Cardiovascular:  Negative for chest pain and palpitations.   Gastrointestinal:  Negative for abdominal pain, diarrhea, nausea and vomiting.   Musculoskeletal:  Positive for back pain, joint pain and neck pain.   Neurological:  Negative for dizziness, weakness and headaches.              Objective     /60   Pulse (!) 103   Temp 36.1 °C (96.9 °F) (Temporal)   Resp 16   Ht 1.702 m (5' 7\")   Wt 74.4 kg (164 lb)   LMP 06/30/2011   SpO2 96%   BMI 25.69 kg/m²      Physical Exam  Constitutional:       General: She is not in acute distress.     Appearance: Normal appearance. She is normal weight. She is not ill-appearing.   HENT:      Head: Normocephalic and atraumatic.      Right Ear: Tympanic membrane is not erythematous.      Left Ear: Tympanic " membrane is not erythematous.      Nose: No congestion.      Mouth/Throat:      Mouth: Mucous membranes are moist.   Eyes:      Extraocular Movements: Extraocular movements intact.      Conjunctiva/sclera: Conjunctivae normal.      Pupils: Pupils are equal, round, and reactive to light.   Cardiovascular:      Rate and Rhythm: Normal rate and regular rhythm.   Pulmonary:      Effort: Pulmonary effort is normal. No respiratory distress.      Breath sounds: No stridor. No wheezing.   Musculoskeletal:         General: No swelling. Normal range of motion.      Right wrist: No bony tenderness, snuff box tenderness or crepitus.      Left wrist: No bony tenderness, snuff box tenderness or crepitus.      Cervical back: No swelling, deformity, erythema, rigidity or bony tenderness. Normal range of motion.        Back:       Right knee: No bony tenderness or crepitus. Normal range of motion. No MCL laxity or ACL laxity.      Left knee: No bony tenderness or crepitus. Normal range of motion. No MCL laxity or ACL laxity.       Legs:       Comments: Range of motion to neck, back, bilateral wrists, and bilateral knees intact.  Equal strength in bilateral wrists and bilateral knees.    Pain upon palpation to trapezius muscle without warmth, erythema, edema, or deformity to cervical spine.  Pain also noted upon palpation to bilateral hips without significant deformity to lumbar spine.  No significant deformity, decreased strength, or decreased range of motion to bilateral knees.  Slight tenderness upon palpation to bilateral posterior medial knee ligaments.  No pain upon palpation to bony prominences of the knees.   Skin:     General: Skin is dry.   Neurological:      General: No focal deficit present.      Mental Status: She is alert and oriented to person, place, and time. Mental status is at baseline.      Motor: No weakness.             Assessment & Plan        Assessment & Plan  Sprain of medial collateral ligament of right  knee, subsequent encounter    Orders:    acetaminophen (TYLENOL) 500 MG Tab; Take 1-2 Tablets by mouth every 6 hours as needed for Moderate Pain.    Referral to Occupational Medicine       Continue to rest and elevate bilateral knees as tolerated throughout the day.  Limit standing to 2 hours per shift.  Avoid heavy lifting of students.  Patient can push/pull as tolerated.  Continue right knee immobilizer until pain subsides, and left knee sleeve for stability until pain subsides.  Can take Tylenol up to 1000 mg every 6 hours as needed for pain.  Continue OTC Aleve up to 800 mg every 8 hours.  Rotate doses of Tylenol and NSAIDs.  Ice, especially at night.  Heat in between.  Patient educated that sprains can take 6 to 8 weeks to heal.  Given new onset back and neck pain will refer to occupational medicine for complexity of injury.    Patient understands and is agreeable to treatment plan.

## 2025-01-10 ENCOUNTER — OCCUPATIONAL MEDICINE (OUTPATIENT)
Dept: OCCUPATIONAL MEDICINE | Facility: CLINIC | Age: 65
End: 2025-01-10
Payer: COMMERCIAL

## 2025-01-10 VITALS
DIASTOLIC BLOOD PRESSURE: 70 MMHG | TEMPERATURE: 97.2 F | HEIGHT: 67 IN | HEART RATE: 97 BPM | SYSTOLIC BLOOD PRESSURE: 120 MMHG | WEIGHT: 165 LBS | BODY MASS INDEX: 25.9 KG/M2 | RESPIRATION RATE: 16 BRPM
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
## 2025-01-10 DIAGNOSIS — S60.222D CONTUSION OF LEFT HAND, SUBSEQUENT ENCOUNTER: ICD-10-CM

## 2025-01-10 DIAGNOSIS — S80.02XD CONTUSION OF LEFT KNEE, SUBSEQUENT ENCOUNTER: ICD-10-CM

## 2025-01-10 DIAGNOSIS — S60.221D CONTUSION OF RIGHT HAND, SUBSEQUENT ENCOUNTER: ICD-10-CM

## 2025-01-10 DIAGNOSIS — S80.01XD CONTUSION OF RIGHT KNEE, SUBSEQUENT ENCOUNTER: ICD-10-CM

## 2025-01-10 PROCEDURE — 99214 OFFICE O/P EST MOD 30 MIN: CPT | Performed by: NURSE PRACTITIONER

## 2025-01-10 ASSESSMENT — PAIN SCALES - GENERAL: PAINLEVEL_OUTOF10: 4=SLIGHT-MODERATE PAIN

## 2025-01-10 NOTE — PROGRESS NOTES
"Subjective:     Gem Barnett is a 64 y.o. female who presents for Other ((DOI 12/03/2024 Left wrist pain, Left hand pain, acute meniscal tear of right knee/SAME)RM16)      DOI: 12/3/2024. RANI: Patient with a ground-level fall after tripping over a student.  Fell straight forward and caught herself on her hands. Has injuries to knees and neck.     Today patient states she has had some improvement with some of her symptoms.  She continues to have the worst of her symptoms is the left knee.  She states it feels like someone is stabbing her in her meniscus.  Patient states that her neck is sore, her wrists and hands are sore and she has some pain at the base of her thumb.  She denies numbness, tingling, weakness, or swelling.  No areas are swollen or physically bruised/discolored.  She takes naproxen if needed.  She is currently on light duty with minimal difficulty.  Patient requesting chiropractor, orthopedics, and physical therapy at this time.  Plan of care discussed with patient.         ROS: All systems were reviewed on intake form, form was reviewed and signed. See scanned documents in media. Pertinent positives and negatives included in HPI.    PMH: No pertinent past medical history to this problem  MEDS: Medications were reviewed in Epic  ALLERGIES:   Allergies   Allergen Reactions    Farxiga [Dapagliflozin] Itching     Complains of hives and itching    Invokana [Canagliflozin] Itching     Complains of hives and itching    Jardiance [Empagliflozin] Itching     SOCHX: Works as social needs professional at [employer]   FH: No pertinent family history to this problem       Objective:     /70 (BP Location: Right arm, Patient Position: Sitting, BP Cuff Size: Adult)   Pulse 97   Temp 36.2 °C (97.2 °F)   Resp 16   Ht 1.702 m (5' 7\")   Wt 74.8 kg (165 lb)   LMP 06/30/2011   BMI 25.84 kg/m²     [unfilled]    Right wrist: No bony tenderness, snuff box tenderness or crepitus.  Very " small ganglion cyst present.   strength 5/5.  TTP to the palmar aspect of the hand/wrist.  Full range of motion.  Left wrist: No bony tenderness, snuff box tenderness or crepitus.  TTP to the palmar aspect of the hand/wrist.   strength 5/5.  Full range of motion.  Right knee: No bony tenderness or crepitus. Normal range of motion. No MCL laxity or ACL laxity.  No gait abnormalities  Left knee: No bony tenderness or crepitus. Normal range of motion. No MCL laxity or ACL laxity.  No gait abnormalities  Cervical back: No swelling, deformity, erythema, rigidity or bony tenderness. Normal range of motion.       Assessment/Plan:       1. Ground-level fall  - Referral to Orthopedics  - Referral to Physical Therapy  - Referral to Chiropractic    2. Contusion of left knee, subsequent encounter  - Referral to Orthopedics  - Referral to Physical Therapy  - Referral to Chiropractic    3. Contusion of left hand, subsequent encounter  - Referral to Orthopedics  - Referral to Physical Therapy  - Referral to Chiropractic    4. Contusion of right hand, subsequent encounter  - Referral to Orthopedics  - Referral to Physical Therapy  - Referral to Chiropractic    5. Contusion of right knee, subsequent encounter  - Referral to Orthopedics  - Referral to Physical Therapy  - Referral to Chiropractic           Differential diagnosis, natural history, supportive care, and indications for immediate follow-up discussed.    Approximately 30 minutes were spent in reviewing notes, preparing for visit, obtaining history, exam and evaluation, patient counseling/education and post visit documentation/orders.

## 2025-01-10 NOTE — LETTER
PHYSICIAN’S AND CHIROPRACTIC PHYSICIAN'S   PROGRESS REPORT   CERTIFICATION OF DISABILITY Claim Number:     Social Security Number:    Patient’s Name: Gem Barnett Date of Injury: 12/3/2024   Employer: [employer] 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 Yes     Explain: DOI: 12/3/2024. RANI: Patient with a ground-level fall after tripping over a student.  Fell straight forward and caught herself on her hands      Objective Medical Findings: Right wrist: No bony tenderness, snuff box tenderness or crepitus.  Very small ganglion cyst present.   strength 5/5.  TTP to the palmar aspect of the hand/wrist.  Full range of motion.  Left wrist: No bony tenderness, snuff box tenderness or crepitus.  TTP to the palmar aspect of the hand/wrist.   strength 5/5.  Full range of motion.  Right knee: No bony tenderness or crepitus. Normal range of motion. No MCL laxity or ACL laxity.  No gait abnormalities  Left knee: No bony tenderness or crepitus. Normal range of motion. No MCL laxity or ACL laxity.  No gait abnormalities  Cervical back: No swelling, deformity, erythema, rigidity or bony tenderness. Normal range of motion.            None - Discharged                         Stable  Yes                 Ratable  No     X   Generally Improved                         Condition Worsened                  Condition Same  May Have Suffered a Permanent Disability No     Treatment Plan:    Follow up in 4 weeks, unless seen by orthopedic  Recommend continue with OTC  naproxen if needed, ice, elevation, and gentle range of motion stretching as tolerated  Knee brace as needed otherwise we did not tolerated  Return to clinic sooner with new or worsening symptoms for further evaluation and management         No Change in Therapy               X   PT/OT Prescribed                      Medication May be Used While Working     X   Case Management                          PT/OT Discontinued  X  Consultation  X  Further Diagnostic Studies:    Prescription(s) Orthopedic referral placed  Physical therapy requested               Released to FULL DUTY /No Restrictions on (Date):       Certified TOTALLY TEMPORARILY DISABLED (Indicate Dates) From:   To:    X  Released to RESTRICTED/Modified Duty on (Date): From: 1/10/2025 To: 2/3/2025   Restrictions Are:  Temporary      No Sitting    No Standing    No Pulling Other: Allow for 15 minutes of break time per classroom    Avoid lifting patients if able       No Bending at Waist     No Stooping     No Lifting        No Carrying     No Walking Lifting Restricted to (lbs.):          No Pushing        No Climbing     No Reaching Above Shoulders       Date of Next Visit:  Monday  2/3/2025  @ 10:45 AM  Date of this Exam: 1/10/2025 Physician/Chiropractic Physician Name: ALEX Hernandes Physician/Chiropractic Physician Signature:  Capo Swift DO MPH     [place]:  [address] [place] [address], [address] [place], [place] [address] - Telephone [phone] [place]:  [address] [place] [address], [address] [place], [place] [address] - Telephone [phone]    [email or web address]  D-39 (Rev. 10/24)

## 2025-02-03 ENCOUNTER — OCCUPATIONAL MEDICINE (OUTPATIENT)
Dept: OCCUPATIONAL MEDICINE | Facility: CLINIC | Age: 65
End: 2025-02-03
Payer: COMMERCIAL

## 2025-02-03 VITALS
TEMPERATURE: 97.3 F | HEIGHT: 67 IN | BODY MASS INDEX: 25.9 KG/M2 | SYSTOLIC BLOOD PRESSURE: 102 MMHG | WEIGHT: 165 LBS | HEART RATE: 103 BPM | OXYGEN SATURATION: 97 % | DIASTOLIC BLOOD PRESSURE: 50 MMHG

## 2025-02-03 DIAGNOSIS — S80.02XD CONTUSION OF LEFT KNEE, SUBSEQUENT ENCOUNTER: ICD-10-CM

## 2025-02-03 DIAGNOSIS — S60.222D CONTUSION OF LEFT HAND, SUBSEQUENT ENCOUNTER: ICD-10-CM
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
## 2025-02-03 DIAGNOSIS — S80.01XD CONTUSION OF RIGHT KNEE, SUBSEQUENT ENCOUNTER: ICD-10-CM

## 2025-02-03 DIAGNOSIS — S60.221D CONTUSION OF RIGHT HAND, SUBSEQUENT ENCOUNTER: ICD-10-CM

## 2025-02-03 PROCEDURE — 99213 OFFICE O/P EST LOW 20 MIN: CPT | Performed by: NURSE PRACTITIONER

## 2025-02-03 ASSESSMENT — ENCOUNTER SYMPTOMS
BACK PAIN: 0
RESPIRATORY NEGATIVE: 1
WEAKNESS: 0
SENSORY CHANGE: 0
NECK PAIN: 0
MYALGIAS: 1
CARDIOVASCULAR NEGATIVE: 1
TINGLING: 0
CONSTITUTIONAL NEGATIVE: 1
PSYCHIATRIC NEGATIVE: 1

## 2025-02-03 NOTE — PROGRESS NOTES
"Subjective:     Gem Barnett is a 64 y.o. female who presents for Follow-Up (DOI: 12/3/24 bilateral hands and knees )      DOI: 12/3/2024. RANI: Patient with a ground-level fall after tripping over a student.  Fell straight forward and caught herself on her hands. Has injuries to knees and neck.      Today symptoms in both wrists are slightly improved.  She is having worsening symptoms in the left and right knee.  She states it feels like someone is stabbing her in her meniscus on both knees.  She states her knees are achy and worse at night.  She is wearing a knee brace and compression on the other knee with some relief of the achiness.  Patient states that her neck is sore and she purchased a pillow to help keep her neck in perfect place.  Continued discomfort to the wrists and hands bilaterally with some pain at the base of her thumb.  She denies numbness, tingling, weakness, or swelling.  No areas are swollen or physically bruised/discolored.  She takes naproxen if needed.  She is currently on light duty with minimal difficulty.  Referrals for chiropractor, orthopedics, and physical therapy are pending at this time.  Plan of care discussed with patient.         Review of Systems   Constitutional: Negative.    Respiratory: Negative.     Cardiovascular: Negative.    Musculoskeletal:  Positive for joint pain and myalgias. Negative for back pain and neck pain.        Ganglion cyst right wrist   Skin: Negative.    Neurological:  Negative for tingling, sensory change and weakness.   Psychiatric/Behavioral: Negative.         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       Objective:     /50 (BP Location: Right arm, Patient Position: Sitting, BP Cuff Size: Adult)   Pulse (!) 103   Temp 36.3 °C (97.3 °F) (Temporal)   Ht 1.702 m (5' 7\")   Wt 74.8 kg (165 lb)   LMP 06/30/2011   SpO2 97%   BMI 25.84 kg/m² "     Constitutional: Patient is in no acute distress. Appears well-developed and well-nourished.   Cardiovascular: Normal rate.    Pulmonary/Chest: Effort normal. No respiratory distress.   Neurological: Patient is alert and oriented to person, place, and time.   Skin: Skin is warm and dry.   Psychiatric: Normal mood and affect. Behavior is normal.     Right wrist: No bony tenderness, snuff box tenderness or crepitus.  Very small ganglion cyst present.   strength 5/5.  Minimal TTP to the palmar aspect of the hand/wrist.  Full range of motion.  Left wrist: No bony tenderness, snuff box tenderness or crepitus.  TTP to the palmar aspect of the hand/wrist.   strength 5/5.  Full range of motion.  Right knee: No bony tenderness or crepitus.  Mild joint line tenderness and soreness noted.  Normal range of motion. No MCL laxity or ACL laxity.  No gait abnormalities  Left knee: No bony tenderness or crepitus.  Mild joint line soreness and tenderness.  Normal range of motion. No MCL laxity or ACL laxity.  No gait abnormalities  Cervical back: No swelling, deformity, erythema, rigidity or bony tenderness. Normal range of motion.  No JVD.  No cervical adenopathy.  No cervical rigidity      Assessment/Plan:       1. Ground-level fall    2. Contusion of left knee, subsequent encounter    3. Contusion of left hand, subsequent encounter    4. Contusion of right hand, subsequent encounter    5. Contusion of right knee, subsequent encounter    Follow-up in 4 weeks, unless seen by orthopedics  Recommend continue with OTC naproxen if needed, ice, elevation, and gentle range of motion stretching as demonstrated and as tolerated  Knee brace only as needed otherwise wean as tolerated  Return to clinic sooner with new or worsening symptoms for further evaluation and management          Differential diagnosis, natural history, supportive care, and indications for immediate follow-up discussed.    Approximately 25 minutes was spent in  preparing for visit, obtaining history, exam and evaluation, patient counseling/education and post visit documentation/orders.

## 2025-02-03 NOTE — LETTER
PHYSICIAN’S AND CHIROPRACTIC PHYSICIAN'S   PROGRESS REPORT   CERTIFICATION OF DISABILITY Claim Number:     Social Security Number:    Patient’s Name: Gem Barnett Date of Injury: 12/3/2024   Employer: [employer] 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 Yes     Explain: DOI: 12/3/2024. RANI: Patient with a ground-level fall after tripping over a student.  Fell straight forward and caught herself on her hands. Has injuries to knees and neck.       Objective Medical Findings: Right wrist: No bony tenderness, snuff box tenderness or crepitus.  Very small ganglion cyst present.   strength 5/5.  Minimal TTP to the palmar aspect of the hand/wrist.  Full range of motion.  Left wrist: No bony tenderness, snuff box tenderness or crepitus.  TTP to the palmar aspect of the hand/wrist.   strength 5/5.  Full range of motion.  Right knee: No bony tenderness or crepitus.  Mild joint line tenderness and soreness noted.  Normal range of motion. No MCL laxity or ACL laxity.  No gait abnormalities  Left knee: No bony tenderness or crepitus.  Mild joint line soreness and tenderness.  Normal range of motion. No MCL laxity or ACL laxity.  No gait abnormalities  Cervical back: No swelling, deformity, erythema, rigidity or bony tenderness. Normal range of motion.  No JVD.  No cervical adenopathy.  No cervical rigidity           None - Discharged                         Stable  Yes                 Ratable  No     X   Generally Improved                         Condition  Worsened               X   Condition Same  May Have Suffered a Permanent Disability No     Treatment Plan:    Follow-up in 4 weeks, unless seen by orthopedics  Recommend continue with OTC naproxen if needed, ice, elevation, and gentle range of motion stretching as demonstrated and as tolerated  Knee brace only as needed otherwise wean as tolerated  Return to clinic sooner with new or worsening symptoms for further evaluation and management         No Change in Therapy               X   PT/OT Prescribed                      Medication May be Used While Working     X   Case Management                          PT/OT Discontinued  X  Consultation  X  Further Diagnostic Studies:    Prescription(s) Physical therapy and Chiropractic referrals are pending  Orthopedic referral pending, transfer care                Released to FULL DUTY /No Restrictions on (Date):       Certified TOTALLY TEMPORARILY DISABLED (Indicate Dates) From:   To:    X  Released to RESTRICTED/Modified Duty on (Date): From: 2/3/2025 To:  03/03/2025  Restrictions Are:  Temporary      No Sitting    No Standing    No Pulling Other: Allow for 15 minutes of break time per classroom     Avoid lifting patients if able          No Bending at Waist     No Stooping     No Lifting        No Carrying     No Walking Lifting Restricted to (lbs.):          No Pushing        No Climbing     No Reaching Above Shoulders       Date of Next Visit:   03/03/2025 @ 11:00 AM Date of this Exam: 2/3/2025 Physician/Chiropractic Physician Name: ALEX Hernandes Physician/Chiropractic Physician Signature:  Capo Swift DO MPH     [place]:  [address] [place] [address], [address] [place], [place] [address] - Telephone [phone] [place]:  [address] [place] [address], [address] [place], [place] [address] - Telephone [phone]    [email or web address]  D-39 (Rev. 10/24)

## 2025-03-03 NOTE — ASSESSMENT & PLAN NOTE
Addended by: POP UMANA on: 3/3/2025 12:01 PM     Modules accepted: Orders     Patient with stress incontinence with running, did not see gyne.

## 2025-03-10 ENCOUNTER — APPOINTMENT (OUTPATIENT)
Dept: OCCUPATIONAL MEDICINE | Facility: CLINIC | Age: 65
End: 2025-03-10
Payer: COMMERCIAL

## 2025-03-10 VITALS
OXYGEN SATURATION: 96 % | BODY MASS INDEX: 25.9 KG/M2 | WEIGHT: 165 LBS | TEMPERATURE: 96.7 F | HEART RATE: 86 BPM | HEIGHT: 67 IN | DIASTOLIC BLOOD PRESSURE: 60 MMHG | SYSTOLIC BLOOD PRESSURE: 116 MMHG | RESPIRATION RATE: 16 BRPM
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
## 2025-03-10 DIAGNOSIS — S80.02XD CONTUSION OF LEFT KNEE, SUBSEQUENT ENCOUNTER: ICD-10-CM

## 2025-03-10 DIAGNOSIS — S60.221D CONTUSION OF RIGHT HAND, SUBSEQUENT ENCOUNTER: ICD-10-CM

## 2025-03-10 DIAGNOSIS — S80.01XD CONTUSION OF RIGHT KNEE, SUBSEQUENT ENCOUNTER: ICD-10-CM

## 2025-03-10 DIAGNOSIS — S60.222D CONTUSION OF LEFT HAND, SUBSEQUENT ENCOUNTER: ICD-10-CM

## 2025-03-10 PROCEDURE — 99213 OFFICE O/P EST LOW 20 MIN: CPT | Performed by: NURSE PRACTITIONER

## 2025-03-10 ASSESSMENT — PAIN SCALES - GENERAL: PAINLEVEL_OUTOF10: 4=SLIGHT-MODERATE PAIN

## 2025-03-10 ASSESSMENT — ENCOUNTER SYMPTOMS
PSYCHIATRIC NEGATIVE: 1
TINGLING: 0
WEAKNESS: 0
SENSORY CHANGE: 0
MYALGIAS: 1
RESPIRATORY NEGATIVE: 1
CONSTITUTIONAL NEGATIVE: 1
CARDIOVASCULAR NEGATIVE: 1

## 2025-03-10 NOTE — LETTER
PHYSICIAN’S AND CHIROPRACTIC PHYSICIAN'S   PROGRESS REPORT   CERTIFICATION OF DISABILITY Claim Number:     Social Security Number:    Patient’s Name: Gem Barnett Date of Injury: 12/3/2024   Employer: [employer] Name of MCO (if applicable):      Patient’s Job Description/Occupation: Special need [employer]       Previous Injuries/Diseases/Surgeries Contributing to the Condition:         Diagnosis: (S80.01XD) Contusion of right knee, subsequent encounter  (S60.222D) Contusion of left hand, subsequent encounter  (S80.02XD) Contusion of left knee, subsequent encounter  (W18.30XA) Ground-level fall  (S60.221D) Contusion of right hand, subsequent encounter      Related to the Industrial Injury? Yes     Explain: DOI: 12/3/2024. RANI: Patient with a ground-level fall after tripping over a student.  Fell straight forward and caught herself on her hands. Has injuries to knees and neck.       Objective Medical Findings: Right wrist: No bony tenderness, snuff box tenderness or crepitus.  Very small ganglion cyst present.   strength 5/5.  Minimal TTP to the palmar aspect of the hand/wrist.  Full range of motion.  Left wrist: No bony tenderness, snuff box tenderness or crepitus.  TTP to the palmar aspect of the hand/wrist.   strength 5/5.  Full range of motion.  Right knee: No bony tenderness or crepitus.  Mild joint line tenderness and soreness noted.  Normal range of motion. No MCL laxity or ACL laxity.  No gait abnormalities  Left knee: No bony tenderness or crepitus.  Mild joint line soreness and tenderness.  Normal range of motion. No MCL laxity or ACL laxity.  No gait abnormalities  Cervical back: No swelling, deformity, erythema, rigidity or bony tenderness. Normal range of motion.  No JVD.  No cervical adenopathy.  No cervical rigidity           None - Discharged                         Stable  Yes                 Ratable  No     X   Generally Improved                         Condition  Worsened               X   Condition Same  May Have Suffered a Permanent Disability No     Treatment Plan:    Follow-up in 4 weeks, unless seen by orthopedics  Recommend continue with OTC naproxen if needed, ice, elevation, and gentle range of motion stretching as demonstrated and as tolerated  Knee brace only as needed otherwise wean as tolerated  Return to clinic sooner with new or worsening symptoms for further evaluation and management           No Change in Therapy               X   PT/OT Prescribed                      Medication May be Used While Working     X   Case Management                          PT/OT Discontinued  X  Consultation  X  Further Diagnostic Studies:    Prescription(s) [facility] therapy and chiropractor referrals are pending  Orthopedic referral pending, transfer care               Released to FULL DUTY /No Restrictions on (Date):       Certified TOTALLY TEMPORARILY DISABLED (Indicate Dates) From:   To:    X  Released to RESTRICTED/Modified Duty on (Date): From: 3/10/2025 To:  4/07/2025  Restrictions Are:  Temporary      No Sitting    No Standing    No Pulling Other: Allow for 15-minute breaks per classroom  Avoid lifting patients if able       No Bending at Waist     No Stooping     No Lifting        No Carrying     No Walking Lifting Restricted to (lbs.):          No Pushing        No Climbing     No Reaching Above Shoulders       Date of Next Visit:   4/07/2025 at 11:15 AM Date of this Exam: 3/10/2025 Physician/Chiropractic Physician Name: ALEX Hernandes Physician/Chiropractic Physician Signature:  Capo Swift DO MPH     [place]:  [address] [place] [address], [address] [place], [place] [address] - Telephone [phone] [place]:  [address] [place] [address], [address] [place], [place] [address] - Telephone [phone]    [email or web address]  D-39 (Rev. 10/24)

## 2025-03-10 NOTE — PROGRESS NOTES
"Subjective:     Gem Barnett is a 64 y.o. female who presents for Other (WC DOI-12/3/24 Bilat hands/ Bilat knees- Pain 4-EX 18)  DOI: 12/3/2024. RANI: Patient with a ground-level fall after tripping over a student.  Fell straight forward and caught herself on her hands. Has injuries to knees and neck.      Today symptoms in both wrists are slightly improved.  She continued discomfort and pain the left and right knee.  She states it feels like someone is stabbing her in her meniscus on both knees.  She states her knees are achy and worse at night.  She is wearing a knee brace and compression on the other knee with some relief of the achiness as needed.  Continued discomfort to the wrists and hands bilaterally with some pain at the base of her thumb.  She denies numbness, tingling, weakness, or swelling.  No areas are swollen or physically bruised/discolored.  He reports that she may have had a previous claim that open for her right wrist and has to follow-up with Worker's Comp. regarding that.  She takes naproxen if needed.  She is currently on light duty with minimal difficulty.  Referrals for chiropractor, orthopedics, and physical therapy are pending at this time.  Plan of care discussed with patient.      Review of Systems   Constitutional: Negative.    Respiratory: Negative.     Cardiovascular: Negative.    Musculoskeletal:  Positive for joint pain and myalgias.   Skin: Negative.    Neurological:  Negative for tingling, sensory change and weakness.   Psychiatric/Behavioral: Negative.         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        Objective:     /60 (BP Location: Left arm, Patient Position: Sitting, BP Cuff Size: Adult)   Pulse 86   Temp 35.9 °C (96.7 °F) (Temporal)   Resp 16   Ht 1.702 m (5' 7\")   Wt 74.8 kg (165 lb)   LMP 06/30/2011   SpO2 96%   BMI 25.84 kg/m²     Constitutional: Patient is in " no acute distress. Appears well-developed and well-nourished.   Cardiovascular: Normal rate.    Pulmonary/Chest: Effort normal. No respiratory distress.   Neurological: Patient is alert and oriented to person, place, and time.   Skin: Skin is warm and dry.   Psychiatric: Normal mood and affect. Behavior is normal.     Right wrist: No bony tenderness, snuff box tenderness or crepitus.  Very small ganglion cyst present.   strength 5/5.  Minimal TTP to the palmar aspect of the hand/wrist.  Full range of motion.  Left wrist: No bony tenderness, snuff box tenderness or crepitus.  TTP to the palmar aspect of the hand/wrist.   strength 5/5.  Full range of motion.  Right knee: No bony tenderness or crepitus.  Mild joint line tenderness and soreness noted.  Normal range of motion. No MCL laxity or ACL laxity.  No gait abnormalities  Left knee: No bony tenderness or crepitus.  Mild joint line soreness and tenderness.  Normal range of motion. No MCL laxity or ACL laxity.  No gait abnormalities  Cervical back: No swelling, deformity, erythema, rigidity or bony tenderness. Normal range of motion.  No JVD.  No cervical adenopathy.  No cervical rigidity      Assessment/Plan:       1. Contusion of right knee, subsequent encounter    2. Contusion of left hand, subsequent encounter    3. Contusion of left knee, subsequent encounter    4. Ground-level fall    5. Contusion of right hand, subsequent encounter    Follow-up in 4 weeks, unless seen by orthopedics  Recommend continue with OTC naproxen if needed, ice, elevation, and gentle range of motion stretching as demonstrated and as tolerated  Knee brace only as needed otherwise wean as tolerated  Return to clinic sooner with new or worsening symptoms for further evaluation and management            Differential diagnosis, natural history, supportive care, and indications for immediate follow-up discussed.    Approximately 25 minutes was spent in preparing for visit, obtaining  history, exam and evaluation, patient counseling/education and post visit documentation/orders.

## 2025-03-20 NOTE — Clinical Note
REFERRAL APPROVAL NOTICE         Sent on March 20, 2025                   Gem CORA Anibal  [address] [id number]                   Dear MsAngela Anibal,    After a careful review of the medical information and benefit coverage, [facility] has processed your referral. See below for additional details.    If applicable, you must be actively enrolled with your insurance for coverage of the authorized service. If you have any questions regarding your coverage, please contact your insurance directly.    REFERRAL INFORMATION   Referral #:  [id number]  Referred-To Department    Referred-By Provider:  Orthopedics    ALEX Hernandes   [facility] (joint)      [address] [facility] [id number] [phone] [address] [place] [address]  [phone]    Referral Start Date:  01/10/2025  Referral End Date:   01/10/2026             SCHEDULING  If you do not already have an appointment, please call [phone] to make an appointment.     MORE INFORMATION  If you do not already have a [facility] account, sign up at: [email or web address]  You can access your medical information, make appointments, see lab results, billing information, and more.  If you have questions regarding this referral, please contact  the [facility] Referrals department at:             [phone]. Monday - Friday 8:00AM - 5:00PM.     Sincerely,    [facility]

## 2025-03-20 NOTE — Clinical Note
REFERRAL APPROVAL NOTICE         Sent on March 20, 2025                   Gem CORA Barnett  [address] [facility] [address]                   Dear Ms. Barnett,    After a careful review of the medical information and benefit coverage, [facility] has processed your referral. See below for additional details.    If applicable, you must be actively enrolled with your insurance for coverage of the authorized service. If you have any questions regarding your coverage, please contact your insurance directly.    REFERRAL INFORMATION   Referral #:  [id number]  Referred-To Provider    Referred-By Provider:  Physical Therapy    ALEX Hernandes   [facility]      [address] [facility] [address]  [phone] [address] [facility] [address]  [phone]    Referral Start Date:  01/10/2025  Referral End Date:   01/10/2026             SCHEDULING  If you do not already have an appointment, please call [phone] to make an appointment.     MORE INFORMATION  If you do not already have a [employer] account, sign up at: [email or web address]  You can access your medical information, make appointments, see lab results, billing information, and more.  If you have questions regarding this referral, please contact  the [facility] Referrals department at:             [phone]. Monday - Friday 8:00AM - 5:00PM.     Sincerely,    [facility]

## 2025-04-04 ENCOUNTER — TELEPHONE (OUTPATIENT)
Dept: OCCUPATIONAL MEDICINE | Facility: CLINIC | Age: 65
End: 2025-04-04
Payer: COMMERCIAL

## 2025-04-04 NOTE — TELEPHONE ENCOUNTER
Spoke with the patient about her orthopedic referral, the phone number for [facility] was provided to the patient. She was advised to give them a call and set up her appt. She was told since she has not seen them we are not able to do the BRUNILDA and will have to come in for her WC FV with us. She did mention that she needed to leave by a certain time and that if we could get her in and out. She was made aware that I am not able to promise that at this time. She was offered to reschedule her appointments if that worked best. At first she wanted too but declined shortly after due to her taking time off for this. She asked if she could come in early for her appointments and she was told that she is able to come in early but I still cannot promise her being out of her at a certain time. She was told that she can play it by ear and she needed to leave she could let us know while she is here.

## 2025-04-07 ENCOUNTER — OCCUPATIONAL MEDICINE (OUTPATIENT)
Dept: OCCUPATIONAL MEDICINE | Facility: CLINIC | Age: 65
End: 2025-04-07
Payer: COMMERCIAL

## 2025-04-07 VITALS
DIASTOLIC BLOOD PRESSURE: 72 MMHG | HEART RATE: 73 BPM | RESPIRATION RATE: 16 BRPM | TEMPERATURE: 97.6 F | HEIGHT: 67 IN | WEIGHT: 165 LBS | SYSTOLIC BLOOD PRESSURE: 110 MMHG | BODY MASS INDEX: 25.9 KG/M2 | OXYGEN SATURATION: 98 %

## 2025-04-07 VITALS
HEIGHT: 67 IN | DIASTOLIC BLOOD PRESSURE: 78 MMHG | OXYGEN SATURATION: 96 % | WEIGHT: 165 LBS | TEMPERATURE: 97.8 F | SYSTOLIC BLOOD PRESSURE: 120 MMHG | BODY MASS INDEX: 25.9 KG/M2 | HEART RATE: 87 BPM | RESPIRATION RATE: 16 BRPM

## 2025-04-07 DIAGNOSIS — S60.211D CONTUSION OF RIGHT WRIST, SUBSEQUENT ENCOUNTER: ICD-10-CM

## 2025-04-07 DIAGNOSIS — S60.222D CONTUSION OF LEFT HAND, SUBSEQUENT ENCOUNTER: ICD-10-CM

## 2025-04-07 DIAGNOSIS — S80.01XD CONTUSION OF RIGHT KNEE, SUBSEQUENT ENCOUNTER: ICD-10-CM

## 2025-04-07 DIAGNOSIS — S60.221D CONTUSION OF RIGHT HAND, SUBSEQUENT ENCOUNTER: ICD-10-CM

## 2025-04-07 DIAGNOSIS — S80.02XD CONTUSION OF LEFT KNEE, SUBSEQUENT ENCOUNTER: ICD-10-CM

## 2025-04-07 PROCEDURE — 99213 OFFICE O/P EST LOW 20 MIN: CPT | Performed by: NURSE PRACTITIONER

## 2025-04-07 PROCEDURE — 99212 OFFICE O/P EST SF 10 MIN: CPT | Performed by: NURSE PRACTITIONER

## 2025-04-07 ASSESSMENT — ENCOUNTER SYMPTOMS
MYALGIAS: 1
WEAKNESS: 1
PSYCHIATRIC NEGATIVE: 1
TINGLING: 0
CONSTITUTIONAL NEGATIVE: 1
SENSORY CHANGE: 0
CARDIOVASCULAR NEGATIVE: 1
RESPIRATORY NEGATIVE: 1

## 2025-04-07 ASSESSMENT — PAIN SCALES - GENERAL
PAINLEVEL_OUTOF10: 4=SLIGHT-MODERATE PAIN
PAINLEVEL_OUTOF10: 4=SLIGHT-MODERATE PAIN
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
## 2025-05-09 ENCOUNTER — TELEPHONE (OUTPATIENT)
Dept: OCCUPATIONAL MEDICINE | Facility: CLINIC | Age: 65
End: 2025-05-09
Payer: COMMERCIAL

## 2025-05-12 ENCOUNTER — APPOINTMENT (OUTPATIENT)
Dept: OCCUPATIONAL MEDICINE | Facility: CLINIC | Age: 65
End: 2025-05-12
Payer: COMMERCIAL

## 2025-05-12 VITALS
SYSTOLIC BLOOD PRESSURE: 116 MMHG | BODY MASS INDEX: 25.43 KG/M2 | WEIGHT: 162 LBS | RESPIRATION RATE: 20 BRPM | OXYGEN SATURATION: 97 % | DIASTOLIC BLOOD PRESSURE: 74 MMHG | HEART RATE: 90 BPM | HEIGHT: 67 IN | TEMPERATURE: 98.1 F

## 2025-05-12 DIAGNOSIS — S80.02XD CONTUSION OF LEFT KNEE, SUBSEQUENT ENCOUNTER: ICD-10-CM

## 2025-05-12 DIAGNOSIS — S60.221D CONTUSION OF RIGHT HAND, SUBSEQUENT ENCOUNTER: ICD-10-CM

## 2025-05-12 DIAGNOSIS — S80.01XD CONTUSION OF RIGHT KNEE, SUBSEQUENT ENCOUNTER: ICD-10-CM

## 2025-05-12 DIAGNOSIS — S60.222D CONTUSION OF LEFT HAND, SUBSEQUENT ENCOUNTER: ICD-10-CM

## 2025-05-12 PROCEDURE — 99213 OFFICE O/P EST LOW 20 MIN: CPT | Performed by: NURSE PRACTITIONER

## 2025-05-12 ASSESSMENT — ENCOUNTER SYMPTOMS
WEAKNESS: 1
RESPIRATORY NEGATIVE: 1
PSYCHIATRIC NEGATIVE: 1
TINGLING: 0
CARDIOVASCULAR NEGATIVE: 1
SENSORY CHANGE: 0
CONSTITUTIONAL NEGATIVE: 1
MYALGIAS: 1
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
## 2025-06-23 ENCOUNTER — APPOINTMENT (OUTPATIENT)
Dept: OCCUPATIONAL MEDICINE | Facility: CLINIC | Age: 65
End: 2025-06-23
Payer: COMMERCIAL

## 2025-08-27 ENCOUNTER — OFFICE VISIT (OUTPATIENT)
Dept: URGENT CARE | Facility: PHYSICIAN GROUP | Age: 65
End: 2025-08-27
Payer: COMMERCIAL

## 2025-08-27 VITALS
RESPIRATION RATE: 16 BRPM | BODY MASS INDEX: 25.5 KG/M2 | HEART RATE: 84 BPM | TEMPERATURE: 97.7 F | SYSTOLIC BLOOD PRESSURE: 112 MMHG | WEIGHT: 162.8 LBS | OXYGEN SATURATION: 97 % | DIASTOLIC BLOOD PRESSURE: 60 MMHG

## 2025-08-27 DIAGNOSIS — R30.0 DYSURIA: ICD-10-CM

## 2025-08-27 DIAGNOSIS — N30.01 ACUTE CYSTITIS WITH HEMATURIA: Primary | ICD-10-CM

## 2025-08-27 LAB
APPEARANCE UR: CLEAR
BILIRUB UR STRIP-MCNC: NORMAL MG/DL
COLOR UR AUTO: YELLOW
GLUCOSE UR STRIP.AUTO-MCNC: NORMAL MG/DL
KETONES UR STRIP.AUTO-MCNC: NORMAL MG/DL
LEUKOCYTE ESTERASE UR QL STRIP.AUTO: NORMAL
NITRITE UR QL STRIP.AUTO: NORMAL
PH UR STRIP.AUTO: 6 [PH] (ref 5–8)
PROT UR QL STRIP: 30 MG/DL
RBC UR QL AUTO: NORMAL
SP GR UR STRIP.AUTO: 1.02
UROBILINOGEN UR STRIP-MCNC: 1 MG/DL

## 2025-08-27 PROCEDURE — 99213 OFFICE O/P EST LOW 20 MIN: CPT

## 2025-08-27 PROCEDURE — 81002 URINALYSIS NONAUTO W/O SCOPE: CPT

## 2025-08-27 PROCEDURE — 3074F SYST BP LT 130 MM HG: CPT

## 2025-08-27 PROCEDURE — 3078F DIAST BP <80 MM HG: CPT

## 2025-08-27 RX ORDER — TIRZEPATIDE 12.5 MG/.5ML
INJECTION, SOLUTION SUBCUTANEOUS
COMMUNITY
Start: 2025-07-30

## 2025-08-27 RX ORDER — NITROFURANTOIN 25; 75 MG/1; MG/1
100 CAPSULE ORAL 2 TIMES DAILY
Qty: 10 CAPSULE | Refills: 0 | Status: SHIPPED | OUTPATIENT
Start: 2025-08-27 | End: 2025-09-01

## 2025-08-27 ASSESSMENT — ENCOUNTER SYMPTOMS
WEIGHT LOSS: 0
MYALGIAS: 0
VOMITING: 0